# Patient Record
Sex: FEMALE | Race: WHITE | NOT HISPANIC OR LATINO
[De-identification: names, ages, dates, MRNs, and addresses within clinical notes are randomized per-mention and may not be internally consistent; named-entity substitution may affect disease eponyms.]

---

## 2017-12-07 ENCOUNTER — TRANSCRIPTION ENCOUNTER (OUTPATIENT)
Age: 82
End: 2017-12-07

## 2018-07-18 ENCOUNTER — TRANSCRIPTION ENCOUNTER (OUTPATIENT)
Age: 83
End: 2018-07-18

## 2019-03-21 PROBLEM — Z00.00 ENCOUNTER FOR PREVENTIVE HEALTH EXAMINATION: Status: ACTIVE | Noted: 2019-03-21

## 2020-08-20 ENCOUNTER — APPOINTMENT (OUTPATIENT)
Dept: VASCULAR SURGERY | Facility: CLINIC | Age: 85
End: 2020-08-20
Payer: MEDICARE

## 2020-08-20 VITALS — TEMPERATURE: 97.7 F

## 2020-08-20 PROCEDURE — 93880 EXTRACRANIAL BILAT STUDY: CPT

## 2020-11-17 ENCOUNTER — TRANSCRIPTION ENCOUNTER (OUTPATIENT)
Age: 85
End: 2020-11-17

## 2020-11-25 ENCOUNTER — TRANSCRIPTION ENCOUNTER (OUTPATIENT)
Age: 85
End: 2020-11-25

## 2020-12-22 ENCOUNTER — TRANSCRIPTION ENCOUNTER (OUTPATIENT)
Age: 85
End: 2020-12-22

## 2020-12-29 ENCOUNTER — FORM ENCOUNTER (OUTPATIENT)
Age: 85
End: 2020-12-29

## 2020-12-30 ENCOUNTER — TRANSCRIPTION ENCOUNTER (OUTPATIENT)
Age: 85
End: 2020-12-30

## 2021-01-01 ENCOUNTER — TRANSCRIPTION ENCOUNTER (OUTPATIENT)
Age: 86
End: 2021-01-01

## 2021-01-03 ENCOUNTER — OUTPATIENT (OUTPATIENT)
Dept: INPATIENT UNIT | Facility: HOSPITAL | Age: 86
LOS: 1 days | End: 2021-01-03
Payer: SELF-PAY

## 2021-01-03 ENCOUNTER — APPOINTMENT (OUTPATIENT)
Dept: DISASTER EMERGENCY | Facility: HOSPITAL | Age: 86
End: 2021-01-03

## 2021-01-03 VITALS
HEART RATE: 76 BPM | OXYGEN SATURATION: 96 % | RESPIRATION RATE: 18 BRPM | TEMPERATURE: 99 F | DIASTOLIC BLOOD PRESSURE: 78 MMHG | SYSTOLIC BLOOD PRESSURE: 122 MMHG

## 2021-01-03 VITALS
RESPIRATION RATE: 14 BRPM | SYSTOLIC BLOOD PRESSURE: 135 MMHG | HEART RATE: 75 BPM | DIASTOLIC BLOOD PRESSURE: 85 MMHG | TEMPERATURE: 98 F | OXYGEN SATURATION: 98 %

## 2021-01-03 DIAGNOSIS — U07.1 COVID-19: ICD-10-CM

## 2021-01-03 PROCEDURE — M0239: CPT

## 2021-01-03 RX ORDER — BAMLANIVIMAB 35 MG/ML
700 INJECTION, SOLUTION INTRAVENOUS ONCE
Refills: 0 | Status: COMPLETED | OUTPATIENT
Start: 2021-01-03 | End: 2021-01-03

## 2021-01-03 RX ADMIN — BAMLANIVIMAB 200 MILLIGRAM(S): 35 INJECTION, SOLUTION INTRAVENOUS at 08:05

## 2021-01-03 NOTE — CHART NOTE - NSCHARTNOTEFT_GEN_A_CORE
CC: Monoclonal Antibody Infusion/COVID 19 Positive    85yFemale with PMH hypothyroidism and HLD  Symptoms: loss of sense of taste, soft stool runny nose, minor cough, malaise  Symptoms began on: 12/29/20  Positive test on: 12/29/20      Patient denies any prior COVID treatment and tx reactions  Allergies: NKDA  Medications: synthroid PO daily, pravastatin PO daily (pt unsure dosages)      exam/findings:  T(C): 36.8 (01-03-21 @ 08:05), Max: 36.8 (01-03-21 @ 08:05)  HR: 75 (01-03-21 @ 08:05) (75 - 75)  BP: 135/85 (01-03-21 @ 08:05) (135/85 - 135/85)  RR: 14 (01-03-21 @ 08:05) (14 - 14)  SpO2: 98% (01-03-21 @ 08:05) (98% - 98%)      PE:   Appearance: NAD	  HEENT:   Normal oral mucosa,   Cardiovascular: Normal S1 S2, No JVD, No murmurs, No edema  Respiratory: Lungs clear to auscultation	  Gastrointestinal:  Soft, Non-tender, + BS	  Skin: warm and dry  Neurologic: Non-focal  Extremities: Normal range of motion,    ASSESSMENT:  Pt is a 85y Female with PMH HLD and hypothyroidism Covid + on 12/29/20  referred by Dr Gross who presents to infusion center for Monoclonal antibody infusion (Bamlanivimab)  Symptoms/ Criteria: loss of taste, malaise, minor cough, runny nose, soft stool / age greater than 65      PLAN:  - infusion procedure explained to patient   -Consent for monoclonal antibody infusion obtained   - Risk & benifits discussed/all questions answered  -infuse  Bamlanivimab 700mg  IV over one hour   -observe patient for one hour post infusion, if stable plan to d/c home with f/u as planned per PMD    I have reviewed the Bamlanivimab Emergency Use Authorization (EUA) and I have provided the patient or patient's caregiver with the following information:      1. FDA has authorized emergency use Bamlanivimab, which is not an FDA-approved biological product.  2. The patient or patient's caregiver has the option to accept or refuse administration of Bamlanivimab.   3. The significant known and potential risks and benefits of Bamlanivimab and the extent to which such risks and benefits are unknown.  4. Information on available alternative treatments and risks and benefits of those alternatives. CC: Monoclonal Antibody Infusion/COVID 19 Positive    85yFemale with PMH hypothyroidism and HLD  Symptoms: loss of sense of taste, soft stool runny nose, minor cough, malaise  Symptoms began on: 12/29/20  Positive test on: 12/30/20- verified by clinical call center       Patient denies any prior COVID treatment and tx reactions  Allergies: NKDA  Medications: synthroid PO daily, pravastatin PO daily (pt unsure dosages)      exam/findings:  T(C): 36.8 (01-03-21 @ 08:05), Max: 36.8 (01-03-21 @ 08:05)  HR: 75 (01-03-21 @ 08:05) (75 - 75)  BP: 135/85 (01-03-21 @ 08:05) (135/85 - 135/85)  RR: 14 (01-03-21 @ 08:05) (14 - 14)  SpO2: 98% (01-03-21 @ 08:05) (98% - 98%)      PE:   Appearance: NAD	  HEENT:   Normal oral mucosa,   Cardiovascular: Normal S1 S2, No JVD, No murmurs, No edema  Respiratory: Lungs clear to auscultation	  Gastrointestinal:  Soft, Non-tender, + BS	  Skin: warm and dry  Neurologic: Non-focal  Extremities: Normal range of motion,    ASSESSMENT:  Pt is a 85y Female with PMH HLD and hypothyroidism Covid + on 12/30/20  referred by Dr Gross who presents to infusion center for Monoclonal antibody infusion (Bamlanivimab)  Symptoms/ Criteria: loss of taste, malaise, minor cough, runny nose, soft stool / age greater than 65      PLAN:  - infusion procedure explained to patient   -Consent for monoclonal antibody infusion obtained   - Risk & benifits discussed/all questions answered  -infuse  Bamlanivimab 700mg  IV over one hour   -observe patient for one hour post infusion, if stable plan to d/c home with f/u as planned per PMD    I have reviewed the Bamlanivimab Emergency Use Authorization (EUA) and I have provided the patient or patient's caregiver with the following information:      1. FDA has authorized emergency use Bamlanivimab, which is not an FDA-approved biological product.  2. The patient or patient's caregiver has the option to accept or refuse administration of Bamlanivimab.   3. The significant known and potential risks and benefits of Bamlanivimab and the extent to which such risks and benefits are unknown.  4. Information on available alternative treatments and risks and benefits of those alternatives. CC: Monoclonal Antibody Infusion/COVID 19 Positive    85yFemale with PMH hypothyroidism and HLD  Symptoms: loss of sense of taste, soft stool runny nose, minor cough, malaise  Symptoms began on: 12/29/20  Positive test on: 12/30/20- verified by clinical call center       Patient denies any prior COVID treatment and tx reactions  Allergies: NKDA  Medications: synthroid PO daily, pravastatin PO daily (pt unsure dosages)      exam/findings:  T(C): 36.8 (01-03-21 @ 08:05), Max: 36.8 (01-03-21 @ 08:05)  HR: 75 (01-03-21 @ 08:05) (75 - 75)  BP: 135/85 (01-03-21 @ 08:05) (135/85 - 135/85)  RR: 14 (01-03-21 @ 08:05) (14 - 14)  SpO2: 98% (01-03-21 @ 08:05) (98% - 98%)      PE:   Appearance: NAD	  HEENT:   Normal oral mucosa,   Cardiovascular: Normal S1 S2, No JVD, No murmurs, No edema  Respiratory: Lungs clear to auscultation	  Gastrointestinal:  Soft, Non-tender, + BS	  Skin: warm and dry  Neurologic: Non-focal  Extremities: Normal range of motion,    ASSESSMENT:  Pt is a 85y Female with PMH HLD and hypothyroidism Covid + on 12/30/20  referred by Dr Gross who presents to infusion center for Monoclonal antibody infusion (Bamlanivimab)  Symptoms/ Criteria: loss of taste, malaise, minor cough, runny nose, soft stool / age greater than 65      PLAN:  - infusion procedure explained to patient   -Consent for monoclonal antibody infusion obtained   - Risk & benefits discussed/all questions answered  -infuse  Bamlanivimab 700mg  IV over one hour   -observe patient for one hour post infusion, if stable plan to d/c home with f/u as planned per PMD    I have reviewed the Bamlanivimab Emergency Use Authorization (EUA) and I have provided the patient or patient's caregiver with the following information:      1. FDA has authorized emergency use Bamlanivimab, which is not an FDA-approved biological product.  2. The patient or patient's caregiver has the option to accept or refuse administration of Bamlanivimab.   3. The significant known and potential risks and benefits of Bamlanivimab and the extent to which such risks and benefits are unknown.  4. Information on available alternative treatments and risks and benefits of those alternatives.    1030- Bamlanivimab infusion and post infusion protocol complete  Patient tolerated well and denies any new complaints   T(C): 37 (01-03-21 @ 10:05), Max: 37 (01-03-21 @ 08:36)  HR: 76 (01-03-21 @ 10:05) (72 - 76)  BP: 122/78 (01-03-21 @ 10:05) (114/66 - 135/85)  RR: 18 (01-03-21 @ 10:05) (14 - 18)  SpO2: 96% (01-03-21 @ 10:05) (96% - 98%)      Patient tolerated infusion well and is stable for discharge home  Discharge instructions and strict return precautions reviewed with the patient who indicates understanding. All questions answered.

## 2021-01-04 ENCOUNTER — TRANSCRIPTION ENCOUNTER (OUTPATIENT)
Age: 86
End: 2021-01-04

## 2021-07-23 ENCOUNTER — RESULT REVIEW (OUTPATIENT)
Age: 86
End: 2021-07-23

## 2021-08-11 ENCOUNTER — APPOINTMENT (OUTPATIENT)
Dept: ULTRASOUND IMAGING | Facility: CLINIC | Age: 86
End: 2021-08-11
Payer: MEDICARE

## 2021-08-11 ENCOUNTER — APPOINTMENT (OUTPATIENT)
Dept: MAMMOGRAPHY | Facility: CLINIC | Age: 86
End: 2021-08-11
Payer: MEDICARE

## 2021-08-11 ENCOUNTER — APPOINTMENT (OUTPATIENT)
Dept: RADIOLOGY | Facility: CLINIC | Age: 86
End: 2021-08-11
Payer: MEDICARE

## 2021-08-11 PROCEDURE — 77063 BREAST TOMOSYNTHESIS BI: CPT

## 2021-08-11 PROCEDURE — 76641 ULTRASOUND BREAST COMPLETE: CPT | Mod: 50

## 2021-08-11 PROCEDURE — 77067 SCR MAMMO BI INCL CAD: CPT

## 2021-08-11 PROCEDURE — 77080 DXA BONE DENSITY AXIAL: CPT

## 2021-10-07 ENCOUNTER — INPATIENT (INPATIENT)
Facility: HOSPITAL | Age: 86
LOS: 4 days | Discharge: HOME HEALTH | DRG: 690 | End: 2021-10-12
Attending: INTERNAL MEDICINE | Admitting: INTERNAL MEDICINE
Payer: MEDICARE

## 2021-10-07 VITALS
WEIGHT: 171.08 LBS | SYSTOLIC BLOOD PRESSURE: 209 MMHG | RESPIRATION RATE: 16 BRPM | HEART RATE: 77 BPM | HEIGHT: 60 IN | TEMPERATURE: 98 F | OXYGEN SATURATION: 95 % | DIASTOLIC BLOOD PRESSURE: 92 MMHG
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
## 2021-10-07 DIAGNOSIS — N39.0 URINARY TRACT INFECTION, SITE NOT SPECIFIED: ICD-10-CM

## 2021-10-07 DIAGNOSIS — E03.9 HYPOTHYROIDISM, UNSPECIFIED: ICD-10-CM

## 2021-10-07 DIAGNOSIS — F41.9 ANXIETY DISORDER, UNSPECIFIED: ICD-10-CM

## 2021-10-07 DIAGNOSIS — E78.5 HYPERLIPIDEMIA, UNSPECIFIED: ICD-10-CM

## 2021-10-07 LAB
ALBUMIN SERPL ELPH-MCNC: 4.4 G/DL — SIGNIFICANT CHANGE UP (ref 3.3–5)
ALP SERPL-CCNC: 77 U/L — SIGNIFICANT CHANGE UP (ref 40–120)
ALT FLD-CCNC: 15 U/L — SIGNIFICANT CHANGE UP (ref 10–45)
ANION GAP SERPL CALC-SCNC: 14 MMOL/L — SIGNIFICANT CHANGE UP (ref 5–17)
APPEARANCE UR: ABNORMAL
AST SERPL-CCNC: 19 U/L — SIGNIFICANT CHANGE UP (ref 10–40)
BACTERIA # UR AUTO: NEGATIVE — SIGNIFICANT CHANGE UP
BASOPHILS # BLD AUTO: 0.07 K/UL — SIGNIFICANT CHANGE UP (ref 0–0.2)
BASOPHILS NFR BLD AUTO: 0.8 % — SIGNIFICANT CHANGE UP (ref 0–2)
BILIRUB SERPL-MCNC: 0.4 MG/DL — SIGNIFICANT CHANGE UP (ref 0.2–1.2)
BILIRUB UR-MCNC: NEGATIVE — SIGNIFICANT CHANGE UP
BUN SERPL-MCNC: 22 MG/DL — SIGNIFICANT CHANGE UP (ref 7–23)
CALCIUM SERPL-MCNC: 10 MG/DL — SIGNIFICANT CHANGE UP (ref 8.4–10.5)
CHLORIDE SERPL-SCNC: 104 MMOL/L — SIGNIFICANT CHANGE UP (ref 96–108)
CO2 SERPL-SCNC: 23 MMOL/L — SIGNIFICANT CHANGE UP (ref 22–31)
COLOR SPEC: YELLOW — SIGNIFICANT CHANGE UP
CREAT SERPL-MCNC: 0.83 MG/DL — SIGNIFICANT CHANGE UP (ref 0.5–1.3)
DIFF PNL FLD: NEGATIVE — SIGNIFICANT CHANGE UP
EOSINOPHIL # BLD AUTO: 0.1 K/UL — SIGNIFICANT CHANGE UP (ref 0–0.5)
EOSINOPHIL NFR BLD AUTO: 1.1 % — SIGNIFICANT CHANGE UP (ref 0–6)
EPI CELLS # UR: 2 /HPF — SIGNIFICANT CHANGE UP
GLUCOSE SERPL-MCNC: 139 MG/DL — HIGH (ref 70–99)
GLUCOSE UR QL: NEGATIVE — SIGNIFICANT CHANGE UP
HCT VFR BLD CALC: 45.9 % — HIGH (ref 34.5–45)
HGB BLD-MCNC: 15.5 G/DL — SIGNIFICANT CHANGE UP (ref 11.5–15.5)
HYALINE CASTS # UR AUTO: 2 /LPF — SIGNIFICANT CHANGE UP (ref 0–2)
IMM GRANULOCYTES NFR BLD AUTO: 0.6 % — SIGNIFICANT CHANGE UP (ref 0–1.5)
KETONES UR-MCNC: NEGATIVE — SIGNIFICANT CHANGE UP
LEUKOCYTE ESTERASE UR-ACNC: ABNORMAL
LYMPHOCYTES # BLD AUTO: 2.75 K/UL — SIGNIFICANT CHANGE UP (ref 1–3.3)
LYMPHOCYTES # BLD AUTO: 31.6 % — SIGNIFICANT CHANGE UP (ref 13–44)
MCHC RBC-ENTMCNC: 31.8 PG — SIGNIFICANT CHANGE UP (ref 27–34)
MCHC RBC-ENTMCNC: 33.8 GM/DL — SIGNIFICANT CHANGE UP (ref 32–36)
MCV RBC AUTO: 94.3 FL — SIGNIFICANT CHANGE UP (ref 80–100)
MONOCYTES # BLD AUTO: 0.47 K/UL — SIGNIFICANT CHANGE UP (ref 0–0.9)
MONOCYTES NFR BLD AUTO: 5.4 % — SIGNIFICANT CHANGE UP (ref 2–14)
NEUTROPHILS # BLD AUTO: 5.27 K/UL — SIGNIFICANT CHANGE UP (ref 1.8–7.4)
NEUTROPHILS NFR BLD AUTO: 60.5 % — SIGNIFICANT CHANGE UP (ref 43–77)
NITRITE UR-MCNC: NEGATIVE — SIGNIFICANT CHANGE UP
NRBC # BLD: 0 /100 WBCS — SIGNIFICANT CHANGE UP (ref 0–0)
PH UR: 7.5 — SIGNIFICANT CHANGE UP (ref 5–8)
PLATELET # BLD AUTO: 185 K/UL — SIGNIFICANT CHANGE UP (ref 150–400)
POTASSIUM SERPL-MCNC: 4.2 MMOL/L — SIGNIFICANT CHANGE UP (ref 3.5–5.3)
POTASSIUM SERPL-SCNC: 4.2 MMOL/L — SIGNIFICANT CHANGE UP (ref 3.5–5.3)
PROT SERPL-MCNC: 7.5 G/DL — SIGNIFICANT CHANGE UP (ref 6–8.3)
PROT UR-MCNC: ABNORMAL
RBC # BLD: 4.87 M/UL — SIGNIFICANT CHANGE UP (ref 3.8–5.2)
RBC # FLD: 12.4 % — SIGNIFICANT CHANGE UP (ref 10.3–14.5)
RBC CASTS # UR COMP ASSIST: 6 /HPF — HIGH (ref 0–4)
SARS-COV-2 RNA SPEC QL NAA+PROBE: SIGNIFICANT CHANGE UP
SODIUM SERPL-SCNC: 141 MMOL/L — SIGNIFICANT CHANGE UP (ref 135–145)
SP GR SPEC: 1.02 — SIGNIFICANT CHANGE UP (ref 1.01–1.02)
TROPONIN T, HIGH SENSITIVITY RESULT: 10 NG/L — SIGNIFICANT CHANGE UP (ref 0–51)
TROPONIN T, HIGH SENSITIVITY RESULT: 10 NG/L — SIGNIFICANT CHANGE UP (ref 0–51)
UROBILINOGEN FLD QL: NEGATIVE — SIGNIFICANT CHANGE UP
WBC # BLD: 8.71 K/UL — SIGNIFICANT CHANGE UP (ref 3.8–10.5)
WBC # FLD AUTO: 8.71 K/UL — SIGNIFICANT CHANGE UP (ref 3.8–10.5)
WBC UR QL: 25 /HPF — HIGH (ref 0–5)

## 2021-10-07 PROCEDURE — 99285 EMERGENCY DEPT VISIT HI MDM: CPT

## 2021-10-07 PROCEDURE — 93010 ELECTROCARDIOGRAM REPORT: CPT

## 2021-10-07 PROCEDURE — G1004: CPT

## 2021-10-07 PROCEDURE — 99223 1ST HOSP IP/OBS HIGH 75: CPT

## 2021-10-07 PROCEDURE — 71045 X-RAY EXAM CHEST 1 VIEW: CPT | Mod: 26

## 2021-10-07 PROCEDURE — 70450 CT HEAD/BRAIN W/O DYE: CPT | Mod: 26,MG

## 2021-10-07 RX ORDER — LEVOTHYROXINE SODIUM 125 MCG
1 TABLET ORAL
Qty: 0 | Refills: 0 | DISCHARGE

## 2021-10-07 RX ORDER — ASPIRIN/CALCIUM CARB/MAGNESIUM 324 MG
1 TABLET ORAL
Qty: 0 | Refills: 0 | DISCHARGE

## 2021-10-07 RX ORDER — ENOXAPARIN SODIUM 100 MG/ML
40 INJECTION SUBCUTANEOUS DAILY
Refills: 0 | Status: DISCONTINUED | OUTPATIENT
Start: 2021-10-07 | End: 2021-10-12

## 2021-10-07 RX ORDER — ALPRAZOLAM 0.25 MG
0 TABLET ORAL
Qty: 0 | Refills: 0 | DISCHARGE

## 2021-10-07 RX ORDER — ASPIRIN/CALCIUM CARB/MAGNESIUM 324 MG
81 TABLET ORAL DAILY
Refills: 0 | Status: DISCONTINUED | OUTPATIENT
Start: 2021-10-07 | End: 2021-10-07

## 2021-10-07 RX ORDER — FOLIC ACID 0.8 MG
1 TABLET ORAL DAILY
Refills: 0 | Status: DISCONTINUED | OUTPATIENT
Start: 2021-10-08 | End: 2021-10-12

## 2021-10-07 RX ORDER — CEFTRIAXONE 500 MG/1
1000 INJECTION, POWDER, FOR SOLUTION INTRAMUSCULAR; INTRAVENOUS EVERY 24 HOURS
Refills: 0 | Status: COMPLETED | OUTPATIENT
Start: 2021-10-08 | End: 2021-10-09

## 2021-10-07 RX ORDER — DOCUSATE SODIUM 100 MG
1 CAPSULE ORAL
Qty: 0 | Refills: 0 | DISCHARGE

## 2021-10-07 RX ORDER — ALPRAZOLAM 0.25 MG
0.5 TABLET ORAL AT BEDTIME
Refills: 0 | Status: DISCONTINUED | OUTPATIENT
Start: 2021-10-07 | End: 2021-10-12

## 2021-10-07 RX ORDER — FOLIC ACID 0.8 MG
0 TABLET ORAL
Qty: 0 | Refills: 0 | DISCHARGE

## 2021-10-07 RX ORDER — CHOLECALCIFEROL (VITAMIN D3) 125 MCG
0 CAPSULE ORAL
Qty: 0 | Refills: 0 | DISCHARGE

## 2021-10-07 RX ORDER — ATORVASTATIN CALCIUM 80 MG/1
20 TABLET, FILM COATED ORAL AT BEDTIME
Refills: 0 | Status: DISCONTINUED | OUTPATIENT
Start: 2021-10-07 | End: 2021-10-12

## 2021-10-07 RX ORDER — ALPRAZOLAM 0.25 MG
0.5 TABLET ORAL
Qty: 0 | Refills: 0 | DISCHARGE

## 2021-10-07 RX ORDER — ONDANSETRON 8 MG/1
4 TABLET, FILM COATED ORAL ONCE
Refills: 0 | Status: COMPLETED | OUTPATIENT
Start: 2021-10-07 | End: 2021-10-07

## 2021-10-07 RX ORDER — ASPIRIN/CALCIUM CARB/MAGNESIUM 324 MG
81 TABLET ORAL DAILY
Refills: 0 | Status: DISCONTINUED | OUTPATIENT
Start: 2021-10-08 | End: 2021-10-12

## 2021-10-07 RX ORDER — LEVOTHYROXINE SODIUM 125 MCG
75 TABLET ORAL DAILY
Refills: 0 | Status: DISCONTINUED | OUTPATIENT
Start: 2021-10-08 | End: 2021-10-12

## 2021-10-07 RX ORDER — CEFTRIAXONE 500 MG/1
1000 INJECTION, POWDER, FOR SOLUTION INTRAMUSCULAR; INTRAVENOUS ONCE
Refills: 0 | Status: COMPLETED | OUTPATIENT
Start: 2021-10-07 | End: 2021-10-07

## 2021-10-07 RX ADMIN — ONDANSETRON 4 MILLIGRAM(S): 8 TABLET, FILM COATED ORAL at 21:11

## 2021-10-07 RX ADMIN — Medication 0.5 MILLIGRAM(S): at 22:15

## 2021-10-07 RX ADMIN — ATORVASTATIN CALCIUM 20 MILLIGRAM(S): 80 TABLET, FILM COATED ORAL at 22:15

## 2021-10-07 RX ADMIN — CEFTRIAXONE 100 MILLIGRAM(S): 500 INJECTION, POWDER, FOR SOLUTION INTRAMUSCULAR; INTRAVENOUS at 15:06

## 2021-10-07 NOTE — ED ADULT NURSE NOTE - OBJECTIVE STATEMENT
86 y F pmhx of high cholesterol, hypothyroidism presents to the ED with dizziness, weakness and the inability to stand. Reports that last night she was trying to go to the bathroom and had to hold onto the walls and did fall to her knees. Denies hitting head and LOC. On assessment, A&Ox4. PERRL 2 mm. GALVAN. no facial droop, slurred speech, and arm drift. strength equal in all extremities. Sensation normal in all extremities. no vision changes. Denies headaches, lightheadedness, numbness and tingling. Breathing spontaneously and unlabored on Room air. Denies cough, SOB and CP. No Peripheral edema. Peripheral pulses strong and equal bilaterally. Denies CP, SOB and palpitations. Abdomen soft, nontender, nondistended, negative CVA tenderness, positive bowel sounds in all four quadrants. Pt is continent. Denies n/v/d, dysuria, melena and hematuria. IV placed 18g in LAC. Labs drawn and sent. Pt safety maintained. Call bell within reach. Side rails in upward position. Pt awaiting dispo.

## 2021-10-07 NOTE — PROVIDER CONTACT NOTE (OTHER) - ASSESSMENT
Patient agitated, and vomiting. RR 24, other VSS. Patient very anxious and states she "knows something is very wrong"

## 2021-10-07 NOTE — ED PROVIDER NOTE - CLINICAL SUMMARY MEDICAL DECISION MAKING FREE TEXT BOX
Faisal: 86 year old female with generalized weakness last night and fell to ground and landed on knees and felt weak this am when getting up. will get labs, cxr, ekg, cardiac enzymes, ct head. no focal deficits on exam. will reassess. given not able to ambulate without feeling weak/lightheaded will admit. does not feel room spinning. will r/o infectious causes.

## 2021-10-07 NOTE — H&P ADULT - NSHPPHYSICALEXAM_GEN_ALL_CORE
Vital Signs Last 24 Hrs  T(F): 98.2 (07 Oct 2021 10:55), Max: 98.2 (07 Oct 2021 10:55)  HR: 85 (07 Oct 2021 16:00) (77 - 87)  BP: 178/67 (07 Oct 2021 17:04) (167/65 - 221/91)  RR: 18 (07 Oct 2021 16:00) (16 - 18)  SpO2: 98% (07 Oct 2021 16:00) (95% - 99%)    GEN: elderly woman, laying in stretcher in NAD  PSYCH: A&Ox3, mood and affect appear appropriate   SKIN: intact, no e/o rash  NEURO: no focal neurologic deficits appreciated; CN II-XII intact, sensation intact B/L, motor 5/5 in all mm groups  EYES: PERRL, anicteric, EOMI, no vertical/horizontal nystagmus  HEAD: NC, AT  NECK: supple, no e/o elevated JVP  RESPI: no accessory muscle use, B/L air entry, CTAB   CARDIO: regular rate/rhythm, no LE edema B/L  ABD: soft, NT, ND  EXT: patient able to move all extremities spontaneously  VASC: peripheral pulses palpated

## 2021-10-07 NOTE — ED PROVIDER NOTE - PHYSICAL EXAMINATION
CONSTITUTIONAL: Patient is awake, alert and oriented x 3. Patient is well appearing and in no acute distress.  HEAD: NCAT,   EYES: PERRL b/l, EOMI,   NECK: supple,   LUNGS: CTA B/L,  HEART: RRR.+S1S2   ABDOMEN: Soft nd/nt  EXTREMITY: no edema or calf tenderness b/l, FROM upper and lower ext b/l  SKIN: with no rash or lesions.   NEURO: Cn3-12 grossly intact. Strength 5/5 UE/LE, Nml Sensation CONSTITUTIONAL: Patient is awake, alert and oriented x 3. Patient is well appearing and in no acute distress.  HEAD: NCAT,   EYES: PERRL b/l, EOMI, no horizontal nystagmus,    NECK: supple, FROM,  LUNGS: CTA B/L, no rhonchi, no crackles   HEART: RRR.+S1S2   ABDOMEN: Soft nd/nt  EXTREMITY: no edema or calf tenderness b/l, FROM upper and lower ext b/l  SKIN: with no rash or lesions.   NEURO: Cn3-12 grossly intact. Strength 5/5 UE/LE, Nml Sensation, no focal deficits.

## 2021-10-07 NOTE — ED PROVIDER NOTE - DOMESTIC TRAVEL HIGH RISK QUESTION
[FreeTextEntry1] : -pt evaluated discussed differential diagnosis; sesamoiditis vs fracture \par -pt sent for xrays\par -Dispensed surgical shoe with fabrication of Dancers pad\par -pt instructed to rest with minimal WB activity\par -WIll consider injection therapy once fracture is ruled out\par -Pt to return friday 
No

## 2021-10-07 NOTE — H&P ADULT - HISTORY OF PRESENT ILLNESS
86yoF w/ PMHx significant for HLD and hypothyroidism presents from home with complaints of B/L LE weakness since last night, first noticed when she woke up from sleep to use the bathroom. She was so weak at that point, that she was unable to stand, thereafter falling forward onto her knees, not hitting her head or losing consciousness. She was able to get back into bed by walking while holding onto her furniture, and this morning when she awoke, again to use the bathroom, she had the same symptoms, and was only able to make it to the bathroom this time by holding on to her walls. At baseline patient walks without assistance. She denies associated or recent CP, palpitations, focal neurologic changes, vision changes, numbness/tingling, seizure-like movements, tongue biting, fever/chills, cough, myalgia, abd pain, n/v/d/c, or bowel/bladder incontinence, but she does endorse lightheadedness and increased frequency of urination.    ED Presenting Vitals: 98F, 77bpm, 209/92 --> 167/65 (1hour later w/o intervention), 16br/m, 95% on RA  ED Course: s/p Ceftriaxone 1g IV x1; UCx sent by ED

## 2021-10-07 NOTE — ED PROVIDER NOTE - PROGRESS NOTE DETAILS
Niya: Patient still feels lightheaded still when gets up. UA shows UTI. spoke with Dr. Gross who agrees with plan and will admit patient. not CDU candidate at this time.  requiring wheelchair to go to bathroom and assistance. no focal deficits, 5/5 b/l le/ue. eomi, peerla, no nystagmus.

## 2021-10-07 NOTE — PROVIDER CONTACT NOTE (OTHER) - BACKGROUND
Patient admitted for UTI. Has been hypertensive since arrival and weak. History of HLD and hypothyroidism.

## 2021-10-07 NOTE — ED PROVIDER NOTE - OBJECTIVE STATEMENT
85 y/o female with PMHx of HLD and hypothyroidism presents to the ED complaining of weakness in her legs since last night. Patient states that she woke up from sleep last night to use the bathroom and suddenly she felt so weak that she was unable to stand. Patient states that she fell forward and landed on her knees. She did not hit her head or loose consciousness. She was able to get back to her bed by holding onto furniture. States that she also felt "lightheaded". Patient states that this morning when she got up to use the bathroom she had the same symptoms and had to hold onto the wall to make it to the bathroom. Normally patient walks without assistance. Patient states that she has been feeling well lately. Denies any recent illness, headache, fever, chills, chest pain, SOB, cough, abdominal pain, n/v/d, numbness, tingling, loss of sensation, syncope.

## 2021-10-07 NOTE — ED ADULT NURSE REASSESSMENT NOTE - NS ED NURSE REASSESS COMMENT FT1
Handoff report received from MARIBEL Hernandez. Pt resting comfortably in orange 61. Pt A&O x 4, VSS. BP elevated. PA Светлана made aware. PERRL 2mm. VITALIY. Muscle strength +5 in upper and lower extremities bilaterally. Pt c/o weakness and lightheadedness upon standing. Bed in lowest position, side rails up and call bell in reach.

## 2021-10-07 NOTE — ED ADULT NURSE REASSESSMENT NOTE - NS ED NURSE REASSESS COMMENT FT1
KIMBERLY Sotomayor made aware of mannual BP. As per KIMBERLY Sotomayor, no intervention at this time. Pt denies CP and SOB.

## 2021-10-07 NOTE — CHART NOTE - NSCHARTNOTEFT_GEN_A_CORE
Confidential Drug Utilization Report  Search Terms: Oksana Durbin, 1935Search Date: 10/07/2021 18:21:09 PM  The Drug Utilization Report below displays all of the controlled substance prescriptions, if any, that your patient has filled in the last twelve months. The information displayed on this report is compiled from pharmacy submissions to the Department, and accurately reflects the information as submitted by the pharmacies.    This report was requested by: Nathan Tierney | Reference #: [id number]    Others' Prescriptions  Patient Name: Oksana Durbin  YOB: 1935  Address: [address] [place] [address]  Sex: Female    Rx Written	Rx Dispensed	Drug	Quantity	Days Supply	Prescriber Name	Prescriber Lata #	Payment Method	Dispenser  09/08/2021	09/09/2021	alprazolam 1 mg tablet	30	30	RenatoTim aguilera MD	[id number]	Medicare	[id number]  08/05/2021	08/06/2021	alprazolam 1 mg tablet	30	30	RenatoTim MD	[id number]	Medicare	[id number]  07/01/2021	07/02/2021	alprazolam 1 mg tablet	30	30	RenatoTim MD	[id number]	Medicare	[id number]  06/01/2021	06/01/2021	alprazolam 1 mg tablet	30	30	RenatoTim MD	[id number]	Medicare	[id number]  04/29/2021	04/30/2021	alprazolam 1 mg tablet	30	30	RenatoTim	[id number]	Medicare	[id number]  03/23/2021	03/28/2021	alprazolam 1 mg tablet	30	30	RenatoTim	[id number]	Medicare	[id number]  02/26/2021	02/27/2021	alprazolam 1 mg tablet	30	30	RenatoTim	[id number]	Medicare	[id number]  01/25/2021	01/26/2021	alprazolam 1 mg tablet	30	30	RenatoTim MD	[id number]	Medicare	[id number]  12/24/2020	12/25/2020	alprazolam 1 mg tablet	30	30	RenatoTim MD	[id number]	Medicare	[id number]  11/23/2020	11/28/2020	alprazolam 1 mg tablet	30	30	Tim Gross MD	[id number]	Medicare	[id number]  10/28/2020	10/29/2020	alprazolam 1 mg tablet	30	30	Tim Gross MD	[id number]	Medicare	[id number]

## 2021-10-07 NOTE — H&P ADULT - ASSESSMENT
86yoF w/ PMHx significant for HLD and hypothyroidism presents from home with complaints of B/L LE weakness since last night, first noticed when she woke up from sleep to use the bathroom; in setting of recent lightheadedness and increased frequency of urination.

## 2021-10-07 NOTE — H&P ADULT - NSHPLABSRESULTS_GEN_ALL_CORE
Labs and imaging data obtained by the ED personally reviewed.                        15.5   8.71  )-----------( 185      ( 07 Oct 2021 11:22 )             45.9   10-    141  |  104  |  22  ----------------------------<  139<H>  4.2   |  23  |  0.83    Ca    10.0      07 Oct 2021 11:22    TPro  7.5  /  Alb  4.4  /  TBili  0.4  /  DBili  x   /  AST  19  /  ALT  15  /  AlkPhos  77  10-07    hsTrop = 10 x2    Urinalysis Basic - ( 07 Oct 2021 13:09 )  Color: Yellow / Appearance: Turbid / S.018 / pH: x  Gluc: x / Ketone: Negative  / Bili: Negative / Urobili: Negative   Blood: x / Protein: Trace / Nitrite: Negative   Leuk Esterase: Large / RBC: 6 /hpf / WBC 25 /HPF   Sq Epi: x / Non Sq Epi: 2 /hpf / Bacteria: Negative    COVID 19 PCR Not detected    CXR as reported: Clear lungs    CTH w/o contrast as reported: No hydrocephalus, acute intracranial hemorrhage, mass effect, or brain edema.

## 2021-10-07 NOTE — PROVIDER CONTACT NOTE (OTHER) - SITUATION
Patient sitting up to get into the wheelchair to void and is vomiting whenever we try to sit her up at the edge of the bed to pivot her.

## 2021-10-07 NOTE — CONSULT NOTE ADULT - ATTENDING COMMENTS
87 yo female with a PMHx of HLD and hypothyroidism presents to the ED for dizziness. On  10/07 AM she felt a dizziness that she further describes as lightheadedness, as if she was going to faint PE non-focal CTH no acute findings        Impression: presyncope.    Recommendations:  syncope w/u  low suspicion for a central process

## 2021-10-08 LAB
ANION GAP SERPL CALC-SCNC: 17 MMOL/L — SIGNIFICANT CHANGE UP (ref 5–17)
BUN SERPL-MCNC: 13 MG/DL — SIGNIFICANT CHANGE UP (ref 7–23)
CALCIUM SERPL-MCNC: 9.3 MG/DL — SIGNIFICANT CHANGE UP (ref 8.4–10.5)
CHLORIDE SERPL-SCNC: 100 MMOL/L — SIGNIFICANT CHANGE UP (ref 96–108)
CO2 SERPL-SCNC: 23 MMOL/L — SIGNIFICANT CHANGE UP (ref 22–31)
COVID-19 SPIKE DOMAIN AB INTERP: POSITIVE
COVID-19 SPIKE DOMAIN ANTIBODY RESULT: >250 U/ML — HIGH
CREAT SERPL-MCNC: 0.78 MG/DL — SIGNIFICANT CHANGE UP (ref 0.5–1.3)
GLUCOSE SERPL-MCNC: 118 MG/DL — HIGH (ref 70–99)
HCT VFR BLD CALC: 44.3 % — SIGNIFICANT CHANGE UP (ref 34.5–45)
HGB BLD-MCNC: 15.3 G/DL — SIGNIFICANT CHANGE UP (ref 11.5–15.5)
MCHC RBC-ENTMCNC: 32.1 PG — SIGNIFICANT CHANGE UP (ref 27–34)
MCHC RBC-ENTMCNC: 34.5 GM/DL — SIGNIFICANT CHANGE UP (ref 32–36)
MCV RBC AUTO: 92.9 FL — SIGNIFICANT CHANGE UP (ref 80–100)
NRBC # BLD: 0 /100 WBCS — SIGNIFICANT CHANGE UP (ref 0–0)
PLATELET # BLD AUTO: 175 K/UL — SIGNIFICANT CHANGE UP (ref 150–400)
POTASSIUM SERPL-MCNC: 4 MMOL/L — SIGNIFICANT CHANGE UP (ref 3.5–5.3)
POTASSIUM SERPL-SCNC: 4 MMOL/L — SIGNIFICANT CHANGE UP (ref 3.5–5.3)
RBC # BLD: 4.77 M/UL — SIGNIFICANT CHANGE UP (ref 3.8–5.2)
RBC # FLD: 12.3 % — SIGNIFICANT CHANGE UP (ref 10.3–14.5)
SARS-COV-2 IGG+IGM SERPL QL IA: >250 U/ML — HIGH
SARS-COV-2 IGG+IGM SERPL QL IA: POSITIVE
SODIUM SERPL-SCNC: 140 MMOL/L — SIGNIFICANT CHANGE UP (ref 135–145)
WBC # BLD: 11.71 K/UL — HIGH (ref 3.8–10.5)
WBC # FLD AUTO: 11.71 K/UL — HIGH (ref 3.8–10.5)

## 2021-10-08 RX ORDER — ACETAMINOPHEN 500 MG
650 TABLET ORAL ONCE
Refills: 0 | Status: COMPLETED | OUTPATIENT
Start: 2021-10-08 | End: 2021-10-08

## 2021-10-08 RX ORDER — ONDANSETRON 8 MG/1
4 TABLET, FILM COATED ORAL ONCE
Refills: 0 | Status: COMPLETED | OUTPATIENT
Start: 2021-10-08 | End: 2021-10-08

## 2021-10-08 RX ORDER — SENNA PLUS 8.6 MG/1
1 TABLET ORAL ONCE
Refills: 0 | Status: COMPLETED | OUTPATIENT
Start: 2021-10-08 | End: 2021-10-08

## 2021-10-08 RX ORDER — ACETAMINOPHEN 500 MG
650 TABLET ORAL ONCE
Refills: 0 | Status: DISCONTINUED | OUTPATIENT
Start: 2021-10-08 | End: 2021-10-08

## 2021-10-08 RX ORDER — CHLORHEXIDINE GLUCONATE 213 G/1000ML
1 SOLUTION TOPICAL DAILY
Refills: 0 | Status: DISCONTINUED | OUTPATIENT
Start: 2021-10-08 | End: 2021-10-12

## 2021-10-08 RX ORDER — ACETAMINOPHEN 500 MG
1000 TABLET ORAL ONCE
Refills: 0 | Status: DISCONTINUED | OUTPATIENT
Start: 2021-10-08 | End: 2021-10-08

## 2021-10-08 RX ADMIN — Medication 1 MILLIGRAM(S): at 12:05

## 2021-10-08 RX ADMIN — Medication 650 MILLIGRAM(S): at 04:15

## 2021-10-08 RX ADMIN — Medication 81 MILLIGRAM(S): at 12:05

## 2021-10-08 RX ADMIN — Medication 0.5 MILLIGRAM(S): at 21:18

## 2021-10-08 RX ADMIN — CHLORHEXIDINE GLUCONATE 1 APPLICATION(S): 213 SOLUTION TOPICAL at 11:54

## 2021-10-08 RX ADMIN — Medication 75 MICROGRAM(S): at 05:26

## 2021-10-08 RX ADMIN — Medication 650 MILLIGRAM(S): at 03:44

## 2021-10-08 RX ADMIN — Medication 1 TABLET(S): at 13:33

## 2021-10-08 RX ADMIN — ONDANSETRON 4 MILLIGRAM(S): 8 TABLET, FILM COATED ORAL at 21:19

## 2021-10-08 RX ADMIN — SENNA PLUS 1 TABLET(S): 8.6 TABLET ORAL at 21:18

## 2021-10-08 RX ADMIN — CEFTRIAXONE 100 MILLIGRAM(S): 500 INJECTION, POWDER, FOR SOLUTION INTRAMUSCULAR; INTRAVENOUS at 14:02

## 2021-10-08 RX ADMIN — ATORVASTATIN CALCIUM 20 MILLIGRAM(S): 80 TABLET, FILM COATED ORAL at 21:18

## 2021-10-08 RX ADMIN — ENOXAPARIN SODIUM 40 MILLIGRAM(S): 100 INJECTION SUBCUTANEOUS at 12:05

## 2021-10-08 NOTE — PHYSICAL THERAPY INITIAL EVALUATION ADULT - PERTINENT HX OF CURRENT PROBLEM, REHAB EVAL
Pt is a 86yoF admitted to [facility] on 10/[facility]21 w/ PMHx significant for HLD and hypothyroidism presents from home with c/o BLE weakness since last night, first noticed when she woke up from sleep to use the bathroom. She was so weak at that point, that she was unable to stand, thereafter falling forward onto her knees, not hitting her head or (-) LOC.

## 2021-10-08 NOTE — CONSULT NOTE ADULT - ASSESSMENT
Impression:  Unclear etiology of LE weakness, lightheadedness and falling episode due to this.  Although patient may have UTI, she has never had these symptoms before and certainly is not septic.                       Episode of nausea and vomiting yesterday sitting up in bed to go into wheelchair.                              Doubt vertigo.  R/O vertebrobasilar insufficiency.  Prior CT head did reveal intracranial atheromatous changes and patient had right carotid bruit.                          BP elevation on admission likely anxiety.                             R/O orthostasis.    Plan:  Check orthostatics.              Continue ceftriaxone awaiting results of urine culture.              Carotid duplex.               MRI/ MRA head.
Assessment: 87 yo female with a PMHx of HLD and hypothyroidism presents to the ED for dizziness. Patient states she was in her usual state of health yesterday ([birth year] her birthday) and had a big birthday celebration with family and friends. When she woke up early 10/07 AM to use the bathroom, she got out of bed and felt a dizziness that she further describes as lightheadedness, as if she was going to faint. At one point, her knees buckled and she collapsed onto her knees due to this lightheadedness. In the ED, patient was found to have a UTI. BP also persistently elevated, SBP >200 on initial presentation. Patient denies history of HTN, although last checkup with PCP was about 1 year ago.     Impression: Lightheadedness in setting of acute infection and significant HTN. Syncopal workup pending. No suspicion of seizure.    Recommendations:  [] Would obtain orthostatic vitals.  [] If patient does not improve following treatment of UTI, then can consider MRI Head w/o.  [] Treatment of UTI/HTN and rest of care per primary team.    Case to be discussed with neurology attending Dr. Tapia.

## 2021-10-08 NOTE — CONSULT NOTE ADULT - SUBJECTIVE AND OBJECTIVE BOX
ALFREDO HANEY  Female  MRN-[id number]    HPI: 87 yo female with a PMHx of HLD and hypothyroidism presents to the ED for dizziness. Patient states she was in her usual state of health yesterday ([birth year] her birthday) and had a big birthday celebration with family and friends. When she woke up early 10/07 AM to use the bathroom, she got out of bed and felt a dizziness that she further describes as lightheadedness, as if she was going to faint. She managed to get to the bathroom by holding onto furniture. When she finished using the bathroom, she again felt lightheaded upon standing. As she was trying to get back to bed, her knees buckled and she fell down onto her knees. No LOC, did not hit her head. She crawled the rest of the way to her bed and managed to pull herself back up into bed and fell asleep. When she woke up later in the morning she still felt lightheaded and was unable to ambulate without feeling like she was going to pass out so she came to the ED to be evaluated. In the ED, patient was found to have a UTI. BP also persistently elevated, SBP >200 on initial presentation. Patient denies history of HTN, although last checkup with PCP was about 1 year ago. Denies HA, room-spinning dizziness, blurry/double vision, numbness/tingling.    ROS: All negative except as mentioned in HPI.    PAST MEDICAL & SURGICAL HISTORY:  Hypothyroidism    HLD (hyperlipidemia)    MEDICATIONS  (STANDING):  ALPRAZolam 0.5 milliGRAM(s) Oral at bedtime  atorvastatin 20 milliGRAM(s) Oral at bedtime  enoxaparin Injectable 40 milliGRAM(s) SubCutaneous daily    Allergies    No Known Allergies    Vital Signs Last 24 Hrs  T(C): 36.4 (07 Oct 2021 19:27), Max: 36.8 (07 Oct 2021 10:55)  T(F): 97.6 (07 Oct 2021 19:27), Max: 98.2 (07 Oct 2021 10:55)  HR: 76 (07 Oct 2021 19:27) (76 - 87)  BP: 164/78 (07 Oct 2021 19:44) (164/78 - 221/91)  RR: 15 (07 Oct 2021 19:27) (15 - 18)  SpO2: 98% (07 Oct 2021 19:27) (95% - 99%)    General Exam:  Constitutional: Lying on stretcher, NAD.  Head: Normocephalic, atraumatic.  Extremities: No edema.    Neuro Exam:   MS: Alert, eyes open spontaneously. Oriented to self, age, location, month, year. Speech is fluent, not slurred. Able to name objects and repeat. Follows commands.  CN: PERRL. VFF. EOMI. V1-V3 intact. Face symmetric. Tongue midline.   Motor: All extremities antigravity without drift. Proximal muscle groups 5/5 throughout.  Sensory: Intact to LT throughout. No extinction.  Reflexes: 2+ throughout.  Coordination: No dysmetria on FNF or on HTS bilaterally.  Gait: Deferred.    LABS:               15.5   8.71  )-----------( 185      ( 07 Oct 2021 11:22 )             45.9     10-07    141  |  104  |  22  ----------------------------<  139<H>  4.2   |  23  |  0.83    Ca    10.0      07 Oct 2021 11:22    TPro  7.5  /  Alb  4.4  /  TBili  0.4  /  DBili  x   /  AST  19  /  ALT  15  /  AlkPhos  77  10-07    RADIOLOGY:    -10/07 CTH: No hydrocephalus, acute intracranial hemorrhage, mass effect, or brain edema.          
Patient seen and evaluated @ bedside  Chief Complaint: Lightheadedness, unable to walk because of LE weakness    HPI:  86yoF w/ PMHx significant for HLD and hypothyroidism presents from home with complaints of B/L LE weakness since night prior to admission (night of10/6), first noticed when she woke up from sleep to use the bathroom. She was so weak at that point, that she was unable to stand, thereafter falling forward onto her knees, not hitting her head or losing consciousness. She was able to get back into bed by walking while holding onto her furniture, and this morning when she awoke, again to use the bathroom, she had the same symptoms, and was only able to make it to the bathroom this time by holding on to her walls. At baseline patient walks without assistance. She denies associated or recent CP, palpitations, focal neurologic changes, vision changes, numbness/tingling, seizure-like movements, tongue biting, fever/chills, cough, myalgia, abd pain, n/v/d/c, or bowel/bladder incontinence, but she does endorse lightheadedness and increased frequency of urination.    Patient denied associated nausea when at home.  Last night when staff was getting her into wheelchair to take to bathroom she had nausea and vomiting and given Zofran.    U/A consistent with UTI.  Patient has UTIs several times a year and never associated with current symptoms.    A prior CT head was done in [place] after a fall due to slipping.  This did reveal evidence of intracranial atherosclerotic chagnes, chronic small vessl disease and carotid calcific changes.    ED Presenting Vitals: 98F, 77bpm, 209/92 --> 167/65 (1hour later w/o intervention), 16br/m, 95% on RA.  BP has decreased subsequently      PMH:   Hypothyroidism    HLD (hyperlipidemia)    asthmatic bronchitis    one episode of syncope likely vagal    Right rotator cuff tear treated conservatively    Sciatica and lumbar spondylosis treated conservatively 2018    Diverticulosis    Chronic oral lichen planus        PSH:     Cholecystectomy 1991    Tubal ligation    BL cataracts 2012    Medications:   ALPRAZolam 0.5 milliGRAM(s) Oral at bedtime  aspirin enteric coated 81 milliGRAM(s) Oral daily  atorvastatin 20 milliGRAM(s) Oral at bedtime  cefTRIAXone   IVPB 1000 milliGRAM(s) IV Intermittent every 24 hours  chlorhexidine 2% Cloths 1 Application(s) Topical daily  enoxaparin Injectable 40 milliGRAM(s) SubCutaneous daily  folic acid 1 milliGRAM(s) Oral daily  levothyroxine 75 MICROGram(s) Oral daily  Nephro-salvatore 1 Tablet(s) Oral daily    Allergies:  No Known Allergies    FAMILY HISTORY:    Social History:    Smoking:  [place]    REVIEW OF SYSTEMS:  See HPI  CONSTITUTIONAL: + weakness, - fevers or chills  RESPIRATORY: No cough, wheezing, hemoptysis; No shortness of breath  CARDIOVASCULAR: No chest pain or palpitations  All other review of systems is negative unless indicated above    Physical Exam:  T(C): 36.8 (10-07-21 @ 22:52), Max: 36.8 (10-07-21 @ 10:55)  HR: 69 (10-07-21 @ 22:52) (69 - 87)  BP: 163/70 (10-08-21 @ 03:54) (151/77 - 221/91)  RR: 18 (10-07-21 @ 22:52) (15 - 18)  SpO2: 98% (10-07-21 @ 22:52) (95% - 99%)  Wt(kg): --    10-07 @ 07:01  -  10-08 @ 07:00  --------------------------------------------------------  IN: 120 mL / OUT: 800 mL / NET: -680 mL      Daily Height in cm: 152.4 (07 Oct 2021 10:28)    Daily     Appearance:  Normal, NAD  Eyes:  EOMI  HEENT: Normal oral mucosa NC/AT  Neck:  No JVD or HJR.  Carotids full + R carotid bruit  Respiratory: Clear to auscultation bilaterally  Cardiovascular: Normal S1 and S2 with faint early systolic murmur LSB.  No rubs or gallops  Abdomen:   BS normal, Soft,  Non-tender without organomegaly or masses  Extremities: Without edema, pulses are full      Labs:                        15.5   8.71  )-----------( 185      ( 07 Oct 2021 11:22 )             45.9     10-07    141  |  104  |  22  ----------------------------<  139<H>  4.2   |  23  |  0.83    Ca    10.0      07 Oct 2021 11:22    TPro  7.5  /  Alb  4.4  /  TBili  0.4  /  DBili  x   /  AST  19  /  ALT  15  /  AlkPhos  77  10-07    Trop 10 and repeat 10  Urinalysis + Microscopic Examination (10.07.21 @ 13:09)   Color: Yellow   Glucose Qualitative, Urine: Negative   Blood, Urine: Negative   Urobilinogen: Negative   Specific Gravity: 1.018   Urine Appearance: Turbid   Protein, Urine: Trace   pH Urine: 7.5   Leukocyte Esterase Concentration: Large   Nitrite: Negative   Ketone - Urine: Negative   Bilirubin: Negative   Red Blood Cell - Urine: 6 /hpf   White Blood Cell - Urine: 25 /HPF   Epithelial Cells: 2 /hpf   Hyaline Casts: 2 /lpf   Bacteria: Negative         ECG: NSR 66.  NOrmal axis and intervals.  Non-specific T wave abnormality.  COmpared to office EKG8/11/20, ther eis no significant change.    EXAM:  CT BRAIN                            PROCEDURE DATE:  10/07/2021            INTERPRETATION:  Noncontrast CT of the brain.    CLINICAL INDICATION:  Weakness in legs    TECHNIQUE : Axial CT scanning of the brain was obtained from the skull baseto the vertex without the administration of intravenous contrast. Sagittal and coronal reformats were provided.    COMPARISON: None available    FINDINGS:    No hydrocephalus, mass effect, midline shift, acute intracranial hemorrhage, or brain edema.    Mild white matter microvascular ischemic disease.    Visualized paranasal sinuses and mastoid air cells are clear.    IMPRESSION:    No hydrocephalus, acute intracranial hemorrhage, mass effect, or brain edema.      XAM:  XR CHEST PORTABLE URGENT 1V                            PROCEDURE DATE:  10/07/2021            INTERPRETATION:  EXAMINATION: XR CHEST URGENT    CLINICAL INFORMATION: Chest pain.    TECHNIQUE: Single frontal view of the chest was obtained.    COMPARISON: CT chest abdomen pelvis 12/18/2008.    FINDINGS:  Cholecystectomy.    Heart size cannot be accurately assessed in this single projection.  Left lower lung opacity secondary to overlying soft tissue. The lungs are clear.  There is no pneumothorax or large pleural effusion.    IMPRESSION:  Clear lungs

## 2021-10-08 NOTE — PHYSICAL THERAPY INITIAL EVALUATION ADULT - PLANNED THERAPY INTERVENTIONS, PT EVAL
stair neg: GOAL: pt will neg 12 steps 1 HR ind in 2wks./balance training/bed mobility training/gait training/transfer training

## 2021-10-08 NOTE — PHYSICAL THERAPY INITIAL EVALUATION ADULT - ADDITIONAL COMMENTS
pt lives at home alone in a [place], +1 step to enter through garage, +flight of stairs in home with railing, PTA ind amb and ADls, no device, socially active- had luncheon planned this week; +walk in shower.

## 2021-10-08 NOTE — PHYSICAL THERAPY INITIAL EVALUATION ADULT - PRECAUTIONS/LIMITATIONS, REHAB EVAL
She was able to get back into bed by walking while holding onto her furniture, and this morning when she awoke, again to use the bathroom, she had the same symptoms, and was only able to make it to the bathroom this time by holding on to her walls. At baseline pt walks without assistance. She denies associated or recent CP, palpitations, focal neurologic changes, vision changes, numbness/tingling, seizure-like movements, tongue biting, fever/chills, cough, myalgia, abd pain, n/v/d/c, or bowel/bladder incontinence, but she does endorse lightheadedness and increased frequency of urination. Hospital course: pt was found to have a UTI. BP also persistently elevated, SBP >200 on initial presentation. Patient denies history of HTN, although last checkup with PCP was about 1 year ago. +Lightheadedness in setting of acute infection and significant HTN. Syncopal workup pending. No suspicion of seizure. A prior CT head was done in [place] after a fall due to slipping.  This did reveal evidence of intracranial atherosclerotic chagnes, chronic small vessl disease and carotid calcific changes.Per cardiolgy, R/O vertebrobasilar insufficiency, planning for carotid duplex/MRI/MRA head. CTH: No hydrocephalus, acute intracranial hemorrhage, mass effect, or brain edema. CXR: clear lungs She was able to get back into bed by walking while holding onto her furniture, and this morning when she awoke, again to use the bathroom, she had the same symptoms, and was only able to make it to the bathroom this time by holding on to her walls. At baseline pt walks without assistance. She denies associated or recent CP, palpitations, focal neurologic changes, vision changes, numbness/tingling, seizure-like movements, tongue biting, fever/chills, cough, myalgia, abd pain, n/v/d/c, or bowel/bladder incontinence, but she does endorse lightheadedness and increased frequency of urination. Hospital course: pt was found to have a UTI. BP also persistently elevated, SBP >200 on initial presentation. Patient denies history of HTN, although last checkup with PCP was about 1 year ago. +Lightheadedness in setting of acute infection and significant HTN. Syncopal workup pending. No suspicion of seizure. A prior CT head was done in [place] after a fall due to slipping.  This did reveal evidence of intracranial atherosclerotic chagnes, chronic small vessl disease and carotid calcific changes.Per cardiolgy, R/O vertebrobasilar insufficiency, planning for carotid duplex/MRI/MRA head. CTH: No hydrocephalus, acute intracranial hemorrhage, mass effect, or brain edema. CXR: clear lungs/fall precautions

## 2021-10-08 NOTE — PHYSICAL THERAPY INITIAL EVALUATION ADULT - GENERAL OBSERVATIONS, REHAB EVAL
Pt a/w BLE weakness, dizziness, +UTI. Pt received supine in bed, +IVL, A&Ox4, pleasant and cooperative, can get anxious at times during mobility.

## 2021-10-09 LAB
ANION GAP SERPL CALC-SCNC: 15 MMOL/L — SIGNIFICANT CHANGE UP (ref 5–17)
BUN SERPL-MCNC: 12 MG/DL — SIGNIFICANT CHANGE UP (ref 7–23)
CALCIUM SERPL-MCNC: 9.8 MG/DL — SIGNIFICANT CHANGE UP (ref 8.4–10.5)
CHLORIDE SERPL-SCNC: 102 MMOL/L — SIGNIFICANT CHANGE UP (ref 96–108)
CO2 SERPL-SCNC: 21 MMOL/L — LOW (ref 22–31)
CREAT SERPL-MCNC: 0.79 MG/DL — SIGNIFICANT CHANGE UP (ref 0.5–1.3)
CULTURE RESULTS: SIGNIFICANT CHANGE UP
GLUCOSE SERPL-MCNC: 89 MG/DL — SIGNIFICANT CHANGE UP (ref 70–99)
HCT VFR BLD CALC: 45.4 % — HIGH (ref 34.5–45)
HGB BLD-MCNC: 15.4 G/DL — SIGNIFICANT CHANGE UP (ref 11.5–15.5)
MCHC RBC-ENTMCNC: 31.6 PG — SIGNIFICANT CHANGE UP (ref 27–34)
MCHC RBC-ENTMCNC: 33.9 GM/DL — SIGNIFICANT CHANGE UP (ref 32–36)
MCV RBC AUTO: 93 FL — SIGNIFICANT CHANGE UP (ref 80–100)
NRBC # BLD: 0 /100 WBCS — SIGNIFICANT CHANGE UP (ref 0–0)
PLATELET # BLD AUTO: 172 K/UL — SIGNIFICANT CHANGE UP (ref 150–400)
POTASSIUM SERPL-MCNC: 4 MMOL/L — SIGNIFICANT CHANGE UP (ref 3.5–5.3)
POTASSIUM SERPL-SCNC: 4 MMOL/L — SIGNIFICANT CHANGE UP (ref 3.5–5.3)
RBC # BLD: 4.88 M/UL — SIGNIFICANT CHANGE UP (ref 3.8–5.2)
RBC # FLD: 12 % — SIGNIFICANT CHANGE UP (ref 10.3–14.5)
SODIUM SERPL-SCNC: 138 MMOL/L — SIGNIFICANT CHANGE UP (ref 135–145)
SPECIMEN SOURCE: SIGNIFICANT CHANGE UP
WBC # BLD: 9.51 K/UL — SIGNIFICANT CHANGE UP (ref 3.8–10.5)
WBC # FLD AUTO: 9.51 K/UL — SIGNIFICANT CHANGE UP (ref 3.8–10.5)

## 2021-10-09 PROCEDURE — 99231 SBSQ HOSP IP/OBS SF/LOW 25: CPT

## 2021-10-09 RX ADMIN — Medication 0.5 MILLIGRAM(S): at 22:52

## 2021-10-09 RX ADMIN — Medication 1 MILLIGRAM(S): at 11:28

## 2021-10-09 RX ADMIN — ENOXAPARIN SODIUM 40 MILLIGRAM(S): 100 INJECTION SUBCUTANEOUS at 11:27

## 2021-10-09 RX ADMIN — Medication 75 MICROGRAM(S): at 05:07

## 2021-10-09 RX ADMIN — Medication 81 MILLIGRAM(S): at 11:28

## 2021-10-09 RX ADMIN — ATORVASTATIN CALCIUM 20 MILLIGRAM(S): 80 TABLET, FILM COATED ORAL at 22:51

## 2021-10-09 RX ADMIN — Medication 1 TABLET(S): at 11:28

## 2021-10-09 RX ADMIN — CEFTRIAXONE 100 MILLIGRAM(S): 500 INJECTION, POWDER, FOR SOLUTION INTRAMUSCULAR; INTRAVENOUS at 13:37

## 2021-10-09 NOTE — PROGRESS NOTE ADULT - SUBJECTIVE AND OBJECTIVE BOX
CC: urinary frequency  HPI:  86yoF w/ PMHx significant for HLD and hypothyroidism presents from home with complaints of B/L LE weakness since last night, first noticed when she woke up from sleep to use the bathroom. She was so weak at that point, that she was unable to stand, thereafter falling forward onto her knees, not hitting her head or losing consciousness. She was able to get back into bed by walking while holding onto her furniture, and this morning when she awoke, again to use the bathroom, she had the same symptoms, and was only able to make it to the bathroom this time by holding on to her walls. At baseline patient walks without assistance. She denies associated or recent CP, palpitations, focal neurologic changes, vision changes, numbness/tingling, seizure-like movements, tongue biting, fever/chills, cough, myalgia, abd pain, n/v/d/c, or bowel/bladder incontinence, but she does endorse lightheadedness and increased frequency of urination.    ED Presenting Vitals: 89F, 77bpm, 209/92 --> 167/65 (1hour later w/o intervention), 16br/m, 95% on RA  ED Course: s/p Ceftriaxone 1g IV x1; UCx sent by ED (07 Oct 2021 18:02)    Review Of Systems:     No chest pain, shortness of breath, or palpitations            Medications:  ALPRAZolam 0.5 milliGRAM(s) Oral at bedtime  aspirin enteric coated 81 milliGRAM(s) Oral daily  atorvastatin 20 milliGRAM(s) Oral at bedtime  cefTRIAXone   IVPB 1000 milliGRAM(s) IV Intermittent every 24 hours  chlorhexidine 2% Cloths 1 Application(s) Topical daily  enoxaparin Injectable 40 milliGRAM(s) SubCutaneous daily  folic acid 1 milliGRAM(s) Oral daily  levothyroxine 75 MICROGram(s) Oral daily  Nephro-salvatore 1 Tablet(s) Oral daily    PAST MEDICAL & SURGICAL HISTORY:  Hypothyroidism    HLD (hyperlipidemia)      Vitals:  T(C): 36.9 (10-09-21 @ 05:50), Max: 36.9 (10-09-21 @ 05:50)  HR: 83 (10-08-21 @ 23:56) (74 - 83)  BP: 176/78 (10-08-21 @ 23:56) (176/78 - 176/78)  BP(mean): --  RR: 18 (10-08-21 @ 23:56) (18 - 18)  SpO2: 96% (10-08-21 @ 23:56) (95% - 98%)  Wt(kg): --  Daily     Daily   I&O's Summary    08 Oct 2021 07:01  -  09 Oct 2021 07:00  --------------------------------------------------------  IN: 290 mL / OUT: 200 mL / NET: 90 mL        Physical Exam:  Appearance: No acute distress; well appearing  Eyes: PERRL, EOMI, pink conjunctiva  HENT: Normal oral mucosa  Cardiovascular: RRR, S1, S2, no murmurs, rubs, or gallops; no edema; no JVD  Respiratory: Clear to auscultation bilaterally  Gastrointestinal: soft, non-tender, non-distended with normal bowel sounds  Musculoskeletal: No clubbing; no joint deformity   Neurologic: Non-focal  Lymphatic: No lymphadenopathy  Psychiatry: AAOx3, mood & affect appropriate  Skin: No rashes, ecchymoses, or cyanosis                          15.4   9.51  )-----------( 172      ( 09 Oct 2021 07:28 )             45.4     10-08    140  |  100  |  13  ----------------------------<  118<H>  4.0   |  23  |  0.78    Ca    9.3      08 Oct 2021 10:39    TPro  7.5  /  Alb  4.4  /  TBili  0.4  /  DBili  x   /  AST  19  /  ALT  15  /  AlkPhos  77  10-07                  ECG:    Echo:    Stress Testing:     Cath:    Imaging:    Interpretation of Telemetry:

## 2021-10-10 PROCEDURE — 70551 MRI BRAIN STEM W/O DYE: CPT | Mod: 26

## 2021-10-10 PROCEDURE — 99231 SBSQ HOSP IP/OBS SF/LOW 25: CPT

## 2021-10-10 RX ORDER — POLYETHYLENE GLYCOL 3350 17 G/17G
17 POWDER, FOR SOLUTION ORAL ONCE
Refills: 0 | Status: COMPLETED | OUTPATIENT
Start: 2021-10-10 | End: 2021-10-11

## 2021-10-10 RX ADMIN — Medication 0.5 MILLIGRAM(S): at 22:06

## 2021-10-10 RX ADMIN — ENOXAPARIN SODIUM 40 MILLIGRAM(S): 100 INJECTION SUBCUTANEOUS at 12:19

## 2021-10-10 RX ADMIN — Medication 81 MILLIGRAM(S): at 12:19

## 2021-10-10 RX ADMIN — CHLORHEXIDINE GLUCONATE 1 APPLICATION(S): 213 SOLUTION TOPICAL at 12:22

## 2021-10-10 RX ADMIN — Medication 1 TABLET(S): at 12:19

## 2021-10-10 RX ADMIN — Medication 1 MILLIGRAM(S): at 12:19

## 2021-10-10 RX ADMIN — ATORVASTATIN CALCIUM 20 MILLIGRAM(S): 80 TABLET, FILM COATED ORAL at 22:05

## 2021-10-10 RX ADMIN — Medication 75 MICROGRAM(S): at 05:59

## 2021-10-10 NOTE — PROGRESS NOTE ADULT - SUBJECTIVE AND OBJECTIVE BOX
CC: Urinating every 2 hours, constipation  for 3 days  HPI:  86yoF w/ PMHx significant for HLD and hypothyroidism presents from home with complaints of B/L LE weakness since last night, first noticed when she woke up from sleep to use the bathroom. She was so weak at that point, that she was unable to stand, thereafter falling forward onto her knees, not hitting her head or losing consciousness. She was able to get back into bed by walking while holding onto her furniture, and this morning when she awoke, again to use the bathroom, she had the same symptoms, and was only able to make it to the bathroom this time by holding on to her walls. At baseline patient walks without assistance. She denies associated or recent CP, palpitations, focal neurologic changes, vision changes, numbness/tingling, seizure-like movements, tongue biting, fever/chills, cough, myalgia, abd pain, n/v/d/c, or bowel/bladder incontinence, but she does endorse lightheadedness and increased frequency of urination.    ED Presenting Vitals: 98F, 77bpm, 209/92 --> 167/65 (1hour later w/o intervention), 16br/m, 95% on RA  ED Course: s/p Ceftriaxone 1g IV x1; UCx sent by ED (07 Oct 2021 18:02)    Review Of Systems:     No chest pain, shortness of breath, or palpitations            Medications:  ALPRAZolam 0.5 milliGRAM(s) Oral at bedtime  aspirin enteric coated 81 milliGRAM(s) Oral daily  atorvastatin 20 milliGRAM(s) Oral at bedtime  chlorhexidine 2% Cloths 1 Application(s) Topical daily  enoxaparin Injectable 40 milliGRAM(s) SubCutaneous daily  folic acid 1 milliGRAM(s) Oral daily  levothyroxine 75 MICROGram(s) Oral daily  Nephro-salvatore 1 Tablet(s) Oral daily  polyethylene glycol 3350 17 Gram(s) Oral once PRN    PAST MEDICAL & SURGICAL HISTORY:  Hypothyroidism    HLD (hyperlipidemia)      Vitals:  T(C): 36.4 (10-10-21 @ 05:42), Max: 37 (10-09-21 @ 13:00)  HR: 56 (10-10-21 @ 05:42) (56 - 65)  BP: 126/73 (10-10-21 @ 05:42) (126/73 - 149/74)  BP(mean): --  RR: 18 (10-10-21 @ 05:42) (18 - 18)  SpO2: 98% (10-10-21 @ 05:42) (96% - 98%)  Wt(kg): --  Daily     Daily   I&O's Summary    09 Oct 2021 07:01  -  10 Oct 2021 07:00  --------------------------------------------------------  IN: 370 mL / OUT: 0 mL / NET: 370 mL        Physical Exam:  Appearance: No acute distress; well appearing  Eyes: PERRL, EOMI, pink conjunctiva  HENT: Normal oral mucosa  Cardiovascular: RRR, S1, S2, no murmurs, rubs, or gallops; no edema; no JVD  Respiratory: Clear to auscultation bilaterally  Gastrointestinal: soft, non-tender, non-distended with normal bowel sounds  Musculoskeletal: No clubbing; no joint deformity   Neurologic: Non-focal  Lymphatic: No lymphadenopathy  Psychiatry: AAOx3, mood & affect appropriate  Skin: No rashes, ecchymoses, or cyanosis                          15.4   9.51  )-----------( 172      ( 09 Oct 2021 07:28 )             45.4     10-09    138  |  102  |  12  ----------------------------<  89  4.0   |  21<L>  |  0.79    Ca    9.8      09 Oct 2021 07:28                    ECG:    Echo:    Stress Testing:     Cath:    Imaging:    Interpretation of Telemetry:

## 2021-10-11 LAB
ANION GAP SERPL CALC-SCNC: 15 MMOL/L — SIGNIFICANT CHANGE UP (ref 5–17)
BUN SERPL-MCNC: 14 MG/DL — SIGNIFICANT CHANGE UP (ref 7–23)
CALCIUM SERPL-MCNC: 10 MG/DL — SIGNIFICANT CHANGE UP (ref 8.4–10.5)
CHLORIDE SERPL-SCNC: 103 MMOL/L — SIGNIFICANT CHANGE UP (ref 96–108)
CO2 SERPL-SCNC: 24 MMOL/L — SIGNIFICANT CHANGE UP (ref 22–31)
CREAT SERPL-MCNC: 0.92 MG/DL — SIGNIFICANT CHANGE UP (ref 0.5–1.3)
CULTURE RESULTS: SIGNIFICANT CHANGE UP
GLUCOSE SERPL-MCNC: 102 MG/DL — HIGH (ref 70–99)
HCT VFR BLD CALC: 49 % — HIGH (ref 34.5–45)
HGB BLD-MCNC: 16 G/DL — HIGH (ref 11.5–15.5)
MCHC RBC-ENTMCNC: 31.1 PG — SIGNIFICANT CHANGE UP (ref 27–34)
MCHC RBC-ENTMCNC: 32.7 GM/DL — SIGNIFICANT CHANGE UP (ref 32–36)
MCV RBC AUTO: 95.3 FL — SIGNIFICANT CHANGE UP (ref 80–100)
NRBC # BLD: 0 /100 WBCS — SIGNIFICANT CHANGE UP (ref 0–0)
PLATELET # BLD AUTO: 177 K/UL — SIGNIFICANT CHANGE UP (ref 150–400)
POTASSIUM SERPL-MCNC: 5 MMOL/L — SIGNIFICANT CHANGE UP (ref 3.5–5.3)
POTASSIUM SERPL-SCNC: 5 MMOL/L — SIGNIFICANT CHANGE UP (ref 3.5–5.3)
RBC # BLD: 5.14 M/UL — SIGNIFICANT CHANGE UP (ref 3.8–5.2)
RBC # FLD: 12.5 % — SIGNIFICANT CHANGE UP (ref 10.3–14.5)
SODIUM SERPL-SCNC: 142 MMOL/L — SIGNIFICANT CHANGE UP (ref 135–145)
SPECIMEN SOURCE: SIGNIFICANT CHANGE UP
WBC # BLD: 8.41 K/UL — SIGNIFICANT CHANGE UP (ref 3.8–10.5)
WBC # FLD AUTO: 8.41 K/UL — SIGNIFICANT CHANGE UP (ref 3.8–10.5)

## 2021-10-11 PROCEDURE — 93880 EXTRACRANIAL BILAT STUDY: CPT | Mod: 26

## 2021-10-11 RX ORDER — POLYETHYLENE GLYCOL 3350 17 G/17G
17 POWDER, FOR SOLUTION ORAL DAILY
Refills: 0 | Status: DISCONTINUED | OUTPATIENT
Start: 2021-10-11 | End: 2021-10-12

## 2021-10-11 RX ORDER — ZINC SULFATE TAB 220 MG (50 MG ZINC EQUIVALENT) 220 (50 ZN) MG
0 TAB ORAL
Qty: 0 | Refills: 0 | DISCHARGE

## 2021-10-11 RX ORDER — SENNA PLUS 8.6 MG/1
2 TABLET ORAL AT BEDTIME
Refills: 0 | Status: DISCONTINUED | OUTPATIENT
Start: 2021-10-11 | End: 2021-10-12

## 2021-10-11 RX ADMIN — Medication 81 MILLIGRAM(S): at 11:16

## 2021-10-11 RX ADMIN — POLYETHYLENE GLYCOL 3350 17 GRAM(S): 17 POWDER, FOR SOLUTION ORAL at 11:17

## 2021-10-11 RX ADMIN — ENOXAPARIN SODIUM 40 MILLIGRAM(S): 100 INJECTION SUBCUTANEOUS at 11:16

## 2021-10-11 RX ADMIN — SENNA PLUS 2 TABLET(S): 8.6 TABLET ORAL at 22:04

## 2021-10-11 RX ADMIN — POLYETHYLENE GLYCOL 3350 17 GRAM(S): 17 POWDER, FOR SOLUTION ORAL at 06:33

## 2021-10-11 RX ADMIN — Medication 1 MILLIGRAM(S): at 11:16

## 2021-10-11 RX ADMIN — Medication 1 TABLET(S): at 11:18

## 2021-10-11 RX ADMIN — CHLORHEXIDINE GLUCONATE 1 APPLICATION(S): 213 SOLUTION TOPICAL at 11:17

## 2021-10-11 RX ADMIN — Medication 75 MICROGRAM(S): at 06:20

## 2021-10-11 RX ADMIN — Medication 0.5 MILLIGRAM(S): at 22:04

## 2021-10-11 RX ADMIN — ATORVASTATIN CALCIUM 20 MILLIGRAM(S): 80 TABLET, FILM COATED ORAL at 22:04

## 2021-10-11 NOTE — DISCHARGE NOTE PROVIDER - CARE PROVIDER_API CALL
Tim Gross  CARDIOVASCULAR DISEASE  [facility], [place] [address]  Phone: [phone]  Fax: [phone]  Follow Up Time:

## 2021-10-11 NOTE — DISCHARGE NOTE PROVIDER - NSDCMRMEDTOKEN_GEN_ALL_CORE_FT
ALPRAZolam 1 mg oral tablet: 0.5 tab(s) orally once a day  Aspirin Enteric Coated 81 mg oral delayed release tablet: 1 tab(s) orally once a day  bisacodyl 10 mg rectal suppository: 1 suppository(ies) rectal once a day, As Needed  Colace 100 mg oral capsule: 1 cap(s) orally , As Needed  folic acid:   levothyroxine 75 mcg (0.075 mg) oral tablet: 1 tab(s) orally once a day  magnesium:   multivitamin:   ocular lubricant ophthalmic solution: 1 drop(s) to each affected eye 2 times a day  pravastatin 80 mg oral tablet: 1 tab(s) orally once a day  Vitamin D3:    ALPRAZolam 1 mg oral tablet: 0.5 tab(s) orally once a day  Aspirin Enteric Coated 81 mg oral delayed release tablet: 1 tab(s) orally once a day  bisacodyl 10 mg rectal suppository: 1 suppository(ies) rectal once a day, As Needed  Colace 100 mg oral capsule: 1 cap(s) orally , As Needed  folic acid:   Home Physical Therapy : once a day   levothyroxine 75 mcg (0.075 mg) oral tablet: 1 tab(s) orally once a day  magnesium:   multivitamin:   ocular lubricant ophthalmic solution: 1 drop(s) to each affected eye 2 times a day  pravastatin 80 mg oral tablet: 1 tab(s) orally once a day  Rolling walker :   Vitamin D3:

## 2021-10-11 NOTE — PROGRESS NOTE ADULT - SUBJECTIVE AND OBJECTIVE BOX
Neurology Follow up note    Name  ALFREDO HANEY    HPI:  86yoF w/ PMHx significant for HLD and hypothyroidism presents from home with complaints of B/L LE weakness since last night, first noticed when she woke up from sleep to use the bathroom. She was so weak at that point, that she was unable to stand, thereafter falling forward onto her knees, not hitting her head or losing consciousness. She was able to get back into bed by walking while holding onto her furniture, and this morning when she awoke, again to use the bathroom, she had the same symptoms, and was only able to make it to the bathroom this time by holding on to her walls. At baseline patient walks without assistance. She denies associated or recent CP, palpitations, focal neurologic changes, vision changes, numbness/tingling, seizure-like movements, tongue biting, fever/chills, cough, myalgia, abd pain, n/v/d/c, or bowel/bladder incontinence, but she does endorse lightheadedness and increased frequency of urination.    ED Presenting Vitals: 89F, 77bpm, 209/92 --> 167/65 (1hour later w/o intervention), 16br/m, 95% on RA  ED Course: s/p Ceftriaxone 1g IV x1; UCx sent by ED (07 Oct 2021 18:02)      Interval History - Patient seen and examined this am.             Vital Signs Last 24 Hrs  T(C): 36.9 (11 Oct 2021 08:26), Max: 37.1 (10 Oct 2021 16:14)  T(F): 98.4 (11 Oct 2021 08:26), Max: 98.7 (10 Oct 2021 16:14)  HR: 60 (11 Oct 2021 08:26) (60 - 71)  BP: 121/73 (11 Oct 2021 08:26) (121/73 - 150/78)  BP(mean): --  RR: 18 (11 Oct 2021 08:26) (16 - 18)  SpO2: 96% (11 Oct 2021 08:26) (96% - 98%)    PHYSICAL EXAM:      Neurological Exam:  Mental Status - Patient is alert, awake, oriented X3. fluent, names, no dysarthria no aphasia Follows commands well and able to answer questions appropriately. Mood and affect  normal    Cranial Nerves - PERRL, EOMI, VFF, V1-V3 intact, no gross facial asymmetry, tongue/uvula midline    Motor Exam -   Right upper 5/5   Left upper 5/5  Right lower 5/5  Left lower 5/5     nml bulk/tone    Sensory    Intact to light touch and pinprick bilaterally    Coord: FTN intact bilaterally     Gait -  wide based ? magnetic        Medications  ALPRAZolam 0.5 milliGRAM(s) Oral at bedtime  aspirin enteric coated 81 milliGRAM(s) Oral daily  atorvastatin 20 milliGRAM(s) Oral at bedtime  chlorhexidine 2% Cloths 1 Application(s) Topical daily  enoxaparin Injectable 40 milliGRAM(s) SubCutaneous daily  folic acid 1 milliGRAM(s) Oral daily  levothyroxine 75 MICROGram(s) Oral daily  Nephro-salvatore 1 Tablet(s) Oral daily  polyethylene glycol 3350 17 Gram(s) Oral daily  senna 2 Tablet(s) Oral at bedtime      Lab      Radiology  < from: MR Head No Cont (10.10.21 @ 11:39) >  IMPRESSION: No acute intracranial hemorrhage or evidence of acute ischemia.    Unchanged imaging findings for which normal pressure hydrocephalus can be considered. Clinical correlation is acquired for this diagnosis.    Multiple patchy confluent nonspecific abnormal white matter foci of T2/FLAIR prolongation statistically favoring microvascular disease.      --- End of Report ---    < end of copied text >

## 2021-10-11 NOTE — PROGRESS NOTE ADULT - SUBJECTIVE AND OBJECTIVE BOX
No c/o.  Ambulated with physical therapy and patient states no imbalance.    Urinary symptoms of urgency and some discomfort almost all improved.  Urine culture > 3 organisms - contaminated.    Patient with mild orthostatic changes but no hypotension with standing.      [facility] MRI    PROCEDURE DATE:  10/10/2021          INTERPRETATION:  .    CLINICAL INFORMATION: Urinary tract infection. Weakness.    TECHNIQUE: Multiplanar multisequential MRI of the brain was acquired without the administration of IV gadolinium.    COMPARISON: Prior CT examination of the head dated 10/7/2021. No prior brain MRI exams are available for comparison.    FINDINGS: Multiple patchy confluent nonspecific foci of T2/FLAIR hyperintensity are noted throughout the deep and periventricular white matter of the cerebral hemispheres. There is no associated mass effect. There is no evidence of acute ischemia on the diffusion-weighted images.    There is diffuse cerebral volume loss with prominence of the sulci, fissures, and cisternal spaces which is normal for the patient's age.    Mild ventriculomegaly appears unchanged. This appears slightly out of proportion to sulcal prominence. There is chronic of the cerebral sulci at the vertex. The callosal angleappears sharp at the level of the posterior commissure measured in the coronal plane.    Flow-voids are noted throughout the major intracranial vessels, on the T2 weighted images, consistent with their patency. The sellar region and posterior fossa appear unremarkable.    The paranasal sinuses and mastoid air cells are clear. Calvarial signal is within normal limits. There is evidence of bilateral cataract removal.    IMPRESSION: No acute intracranial hemorrhage or evidence of acute ischemia.    Unchanged imaging findings for which normal pressure hydrocephalus can be considered. Clinical correlation is acquired for this diagnosis.    Multiple patchy confluent nonspecific abnormal white matter foci of T2/FLAIR prolongation statistically favoring microvascular disease.      --- End of Report ---                CORA OJEDA MD; Attending Radiologist  This document has been electronically signed. Oct 10 2021  1:34PM          REVIEW OF SYSTEMS:  CARDIOVASCULAR: No chest pain, dyspnea or palpitations  All other review of systems is negative unless indicated above    Medications:  ALPRAZolam 0.5 milliGRAM(s) Oral at bedtime  aspirin enteric coated 81 milliGRAM(s) Oral daily  atorvastatin 20 milliGRAM(s) Oral at bedtime  chlorhexidine 2% Cloths 1 Application(s) Topical daily  enoxaparin Injectable 40 milliGRAM(s) SubCutaneous daily  folic acid 1 milliGRAM(s) Oral daily  levothyroxine 75 MICROGram(s) Oral daily  Nephro-salvatore 1 Tablet(s) Oral daily      Physical Exam:  Vitals:  T(C): 36.4 (10-11-21 @ 05:23), Max: 37.1 (10-10-21 @ 16:14)  HR: 71 (10-11-21 @ 00:07) (64 - 71)  BP: 150/78 (10-11-21 @ 00:07) (150/78 - 150/78)  BP(mean): --  RR: 16 (10-11-21 @ 05:23) (16 - 16)  SpO2: 96% (10-11-21 @ 05:23) (96% - 98%)  Wt(kg): --  Daily     Daily   I&O's Summary    10 Oct 2021 07:01  -  11 Oct 2021 07:00  --------------------------------------------------------  IN: 620 mL / OUT: 400 mL / NET: 220 mL      Appearance:  Normal, NAD  Eyes:  EOMI  HEENT: Normal oral mucosa NC/AT  Neck:  No JVD or HJR.  Carotids full + R carotid bruit  Respiratory: Clear to auscultation bilaterally  Cardiovascular: Normal S1 and S2 with faint early systolic murmur LSB.  No rubs or gallops  Abdomen:   BS normal, Soft,  Non-tender without organomegaly or masses  Extremities: Without edema

## 2021-10-11 NOTE — DISCHARGE NOTE PROVIDER - NSDCCPCAREPLAN_GEN_ALL_CORE_FT
PRINCIPAL DISCHARGE DIAGNOSIS  Diagnosis: Acute UTI  Assessment and Plan of Treatment: You had a bladder &/or kidney infection  Call your doctor with pain or burning with urination, frequent urination, feeling to urinate suddenly, blood in urine, fever, back pain, nausea or vomiting  You need to take and finish your antibiotic as prescribed so that the infection does not reoccur  Drink adequate fluids - there is no harm to try cranberry juice  Follow up with PCP   take meds as directed

## 2021-10-11 NOTE — DISCHARGE NOTE PROVIDER - HOSPITAL COURSE
86yoF w/ PMHx significant for HLD and hypothyroidism here with lightheadedness improved and mildly unsteady gait PE non-focal other than wide based slightlly magentic gait. MRI as above with ventriculomegaly and atrophy  UTI weakness completed dose abx   Neuro consulted   Cardiology consulted   MRI findings more consistent with chronic changes than NPH, no clear DESH. Regardless, patient would not want intervention even if there was NPH    DCP with med rec discussed with Dr Gross PT is cleared for DC   Follow up with Dr Tapia 86yoF w/ PMHx significant for HLD and hypothyroidism here with lightheadedness improved and mildly unsteady gait PE non-focal other than wide based slightlly magentic gait. MRI as above with ventriculomegaly and atrophy  UTI weakness completed dose abx   Neuro consulted   Cardiology consulted   MRI findings more consistent with chronic changes than NPH, no clear DESH. Regardless, patient would not want intervention even if there was NPH    DCP with med rec discussed with Dr Gross PT is cleared for DC with home rehab  Follow up with Dr Tapia

## 2021-10-12 ENCOUNTER — TRANSCRIPTION ENCOUNTER (OUTPATIENT)
Age: 86
End: 2021-10-12

## 2021-10-12 VITALS
OXYGEN SATURATION: 94 % | HEART RATE: 57 BPM | SYSTOLIC BLOOD PRESSURE: 129 MMHG | RESPIRATION RATE: 18 BRPM | DIASTOLIC BLOOD PRESSURE: 61 MMHG | TEMPERATURE: 98 F

## 2021-10-12 LAB
ANION GAP SERPL CALC-SCNC: 11 MMOL/L — SIGNIFICANT CHANGE UP (ref 5–17)
BUN SERPL-MCNC: 20 MG/DL — SIGNIFICANT CHANGE UP (ref 7–23)
CALCIUM SERPL-MCNC: 9.9 MG/DL — SIGNIFICANT CHANGE UP (ref 8.4–10.5)
CHLORIDE SERPL-SCNC: 101 MMOL/L — SIGNIFICANT CHANGE UP (ref 96–108)
CO2 SERPL-SCNC: 25 MMOL/L — SIGNIFICANT CHANGE UP (ref 22–31)
CREAT SERPL-MCNC: 0.94 MG/DL — SIGNIFICANT CHANGE UP (ref 0.5–1.3)
GLUCOSE SERPL-MCNC: 107 MG/DL — HIGH (ref 70–99)
HCT VFR BLD CALC: 47.7 % — HIGH (ref 34.5–45)
HGB BLD-MCNC: 16 G/DL — HIGH (ref 11.5–15.5)
MCHC RBC-ENTMCNC: 31.7 PG — SIGNIFICANT CHANGE UP (ref 27–34)
MCHC RBC-ENTMCNC: 33.5 GM/DL — SIGNIFICANT CHANGE UP (ref 32–36)
MCV RBC AUTO: 94.6 FL — SIGNIFICANT CHANGE UP (ref 80–100)
NRBC # BLD: 0 /100 WBCS — SIGNIFICANT CHANGE UP (ref 0–0)
PLATELET # BLD AUTO: 193 K/UL — SIGNIFICANT CHANGE UP (ref 150–400)
POTASSIUM SERPL-MCNC: 4.7 MMOL/L — SIGNIFICANT CHANGE UP (ref 3.5–5.3)
POTASSIUM SERPL-SCNC: 4.7 MMOL/L — SIGNIFICANT CHANGE UP (ref 3.5–5.3)
RBC # BLD: 5.04 M/UL — SIGNIFICANT CHANGE UP (ref 3.8–5.2)
RBC # FLD: 12.3 % — SIGNIFICANT CHANGE UP (ref 10.3–14.5)
SODIUM SERPL-SCNC: 137 MMOL/L — SIGNIFICANT CHANGE UP (ref 135–145)
WBC # BLD: 11.77 K/UL — HIGH (ref 3.8–10.5)
WBC # FLD AUTO: 11.77 K/UL — HIGH (ref 3.8–10.5)

## 2021-10-12 RX ADMIN — ENOXAPARIN SODIUM 40 MILLIGRAM(S): 100 INJECTION SUBCUTANEOUS at 11:20

## 2021-10-12 RX ADMIN — POLYETHYLENE GLYCOL 3350 17 GRAM(S): 17 POWDER, FOR SOLUTION ORAL at 11:20

## 2021-10-12 RX ADMIN — Medication 81 MILLIGRAM(S): at 11:21

## 2021-10-12 RX ADMIN — CHLORHEXIDINE GLUCONATE 1 APPLICATION(S): 213 SOLUTION TOPICAL at 11:23

## 2021-10-12 RX ADMIN — Medication 1 TABLET(S): at 11:21

## 2021-10-12 RX ADMIN — Medication 75 MICROGRAM(S): at 05:17

## 2021-10-12 RX ADMIN — Medication 1 MILLIGRAM(S): at 11:20

## 2021-10-12 NOTE — PROGRESS NOTE ADULT - SUBJECTIVE AND OBJECTIVE BOX
No c/o lightheadedness or difficultly ambulating.  Still with some constipation.    Neurology follow up noticed ? magnetic gait.    Carotids without significant abnormality and vertebral low antegrade.        REVIEW OF SYSTEMS:  CARDIOVASCULAR: No chest pain, dyspnea or palpitations  All other review of systems is negative unless indicated above    Medications:  ALPRAZolam 0.5 milliGRAM(s) Oral at bedtime  aspirin enteric coated 81 milliGRAM(s) Oral daily  atorvastatin 20 milliGRAM(s) Oral at bedtime  chlorhexidine 2% Cloths 1 Application(s) Topical daily  enoxaparin Injectable 40 milliGRAM(s) SubCutaneous daily  folic acid 1 milliGRAM(s) Oral daily  levothyroxine 75 MICROGram(s) Oral daily  Nephro-salvatore 1 Tablet(s) Oral daily  polyethylene glycol 3350 17 Gram(s) Oral daily  senna 2 Tablet(s) Oral at bedtime      Physical Exam:  Vitals:  T(C): 36.7 (10-12-21 @ 00:20), Max: 37.1 (10-11-21 @ 16:24)  HR: 65 (10-12-21 @ 00:20) (65 - 73)  BP: 130/69 (10-12-21 @ 00:20) (123/68 - 130/69)  BP(mean): --  RR: 18 (10-12-21 @ 00:20) (18 - 18)  SpO2: 96% (10-12-21 @ 00:20) (95% - 96%)  Wt(kg): --  Daily     Daily   I&O's Summary    11 Oct 2021 07:01  -  12 Oct 2021 07:00  --------------------------------------------------------  IN: 800 mL / OUT: 0 mL / NET: 800 mL        Appearance:  Normal, NAD  Eyes:  EOMI  HEENT: Normal oral mucosa NC/AT  Neck:  No JVD.    Respiratory: Clear to auscultation bilaterally  Cardiovascular: Normal S1 and S2 with faint early systolic murmur LSB.  No rubs or gallops  Abdomen:   BS normal, Soft,  Non-tender without organomegaly or masses  Extremities: Without edema        10-12    Complete Blood Count in AM (10.12.21 @ 04:54)   Nucleated RBC: 0 /100 WBCs   WBC Count: 11.77 K/uL   RBC Count: 5.04 M/uL   Hemoglobin: 16.0 g/dL   Hematocrit: 47.7 %   Mean Cell Volume: 94.6 fl   Mean Cell Hemoglobin: 31.7 pg   Mean Cell Hemoglobin Conc: 33.5 gm/dL   Red Cell Distrib Width: 12.3 %   Platelet Count - Automated: 193 K/uL     137  |  101  |  20  ----------------------------<  107<H>  4.7   |  25  |  0.94    Ca    9.9      12 Oct 2021 04:54

## 2021-10-12 NOTE — DISCHARGE NOTE NURSING/CASE MANAGEMENT/SOCIAL WORK - PATIENT PORTAL LINK FT
You can access the FollowMyHealth Patient Portal offered by [facility] by registering at the following website: [email or web address]. By joining [employer]’s FollowMyHealth portal, you will also be able to view your health information using other applications (apps) compatible with our system.

## 2021-10-12 NOTE — GOALS OF CARE CONVERSATION - ADVANCED CARE PLANNING - WHAT MATTERS MOST
Pt family is aware of wishes and will carry them out. Pt wants to "live as long as possible but only if I am active"

## 2021-10-12 NOTE — GOALS OF CARE CONVERSATION - ADVANCED CARE PLANNING - CONVERSATION DETAILS
Spoke to pt regarding advance care planning. Patient completed healthcare proxy form. Son as first HCP, daughter as alternate. Scanned document into chart and provided pt with original copy. Pt was encouraged to make multiple copies and give to all healthcare providers.

## 2021-10-12 NOTE — PROGRESS NOTE ADULT - ASSESSMENT
86yoF w/ PMHx significant for HLD and hypothyroidism here with lightheadedness improved and mildly unsteady gait PE non-focal other than wide based slightlly magentic gait. MRI as above with ventriculomegaly and atrophy      Impression: MRI findings more consistent with chronic changes than NPH, no clear DESH. Regardless, patient would not want surgical intervention even if there was NPH    supportive care  PT/OT  Can follow up with Neurology, Dr. Jadiel Tapia at [phone] for interim Follow up 
Impression:  Unclear etiology of falling episode at home.  ? orthostasis although blood pressure standing not demonstrated to be low enough to cause symptoms, ? vertigo without spinning sensation but + nausea.  ? NPH with gait disturbance.    Plan: D/C to home.            Patient states she will f/u with neurologist eventually.            To get home BP monitor to assess if truly hypertensive outside of hospital.            Office f/u.
Leg weakness  S/P Fall  Possible UTI  Await urine C&S  MRI  MRA  Carotid dopplers
UTI  Weakness  Falls  MRI, MRA  Physical therapy  Repeat urine C&S  Miralax
Impression:  Overall improved.  Unclear etiology of episodes of dizziness and inability to walk properly and fall.                              ? UTI with symptoms improved.                                Brain MRI interpreted as possible hydrocephalus.    Plan:  Repeat urine culture pending.             Carotid U/S to be done.              Please have neuro comment on MRI findings.       
86yoF w/ PMHx significant for HLD and hypothyroidism here with lightheadedness improved and mildly unsteady gait PE non-focal other than wide based slightlly magentic gait. MRI as above with ventriculomegaly and atrophy      Impression: MRI findings more consistent with chronic changes than NPH, no clear DESH. Regardless, patient would not want intervention even if there was NPH    supportive care  PT/OT  Can follow up with Neurology, Dr. Jadiel Tapia at [phone] for interim Follow up of gait after PT

## 2021-10-26 PROCEDURE — 93880 EXTRACRANIAL BILAT STUDY: CPT

## 2021-10-26 PROCEDURE — 84484 ASSAY OF TROPONIN QUANT: CPT

## 2021-10-26 PROCEDURE — G1004: CPT

## 2021-10-26 PROCEDURE — U0003: CPT

## 2021-10-26 PROCEDURE — 85027 COMPLETE CBC AUTOMATED: CPT

## 2021-10-26 PROCEDURE — 80053 COMPREHEN METABOLIC PANEL: CPT

## 2021-10-26 PROCEDURE — 82962 GLUCOSE BLOOD TEST: CPT

## 2021-10-26 PROCEDURE — 96375 TX/PRO/DX INJ NEW DRUG ADDON: CPT

## 2021-10-26 PROCEDURE — 70450 CT HEAD/BRAIN W/O DYE: CPT | Mod: MG

## 2021-10-26 PROCEDURE — 70551 MRI BRAIN STEM W/O DYE: CPT

## 2021-10-26 PROCEDURE — 86769 SARS-COV-2 COVID-19 ANTIBODY: CPT

## 2021-10-26 PROCEDURE — 97530 THERAPEUTIC ACTIVITIES: CPT

## 2021-10-26 PROCEDURE — 96374 THER/PROPH/DIAG INJ IV PUSH: CPT

## 2021-10-26 PROCEDURE — 97116 GAIT TRAINING THERAPY: CPT

## 2021-10-26 PROCEDURE — 36415 COLL VENOUS BLD VENIPUNCTURE: CPT

## 2021-10-26 PROCEDURE — 87086 URINE CULTURE/COLONY COUNT: CPT

## 2021-10-26 PROCEDURE — 97162 PT EVAL MOD COMPLEX 30 MIN: CPT

## 2021-10-26 PROCEDURE — 71045 X-RAY EXAM CHEST 1 VIEW: CPT

## 2021-10-26 PROCEDURE — 80048 BASIC METABOLIC PNL TOTAL CA: CPT

## 2021-10-26 PROCEDURE — 99285 EMERGENCY DEPT VISIT HI MDM: CPT

## 2021-10-26 PROCEDURE — U0005: CPT

## 2021-10-26 PROCEDURE — 85025 COMPLETE CBC W/AUTO DIFF WBC: CPT

## 2021-10-26 PROCEDURE — 81001 URINALYSIS AUTO W/SCOPE: CPT

## 2022-07-05 PROBLEM — E03.9 HYPOTHYROIDISM, UNSPECIFIED: Chronic | Status: ACTIVE | Noted: 2021-10-07

## 2022-07-05 PROBLEM — E78.5 HYPERLIPIDEMIA, UNSPECIFIED: Chronic | Status: ACTIVE | Noted: 2021-10-07

## 2022-09-06 ENCOUNTER — APPOINTMENT (OUTPATIENT)
Dept: ULTRASOUND IMAGING | Facility: CLINIC | Age: 87
End: 2022-09-06

## 2022-09-06 ENCOUNTER — APPOINTMENT (OUTPATIENT)
Dept: MAMMOGRAPHY | Facility: CLINIC | Age: 87
End: 2022-09-06

## 2022-09-06 PROCEDURE — 76641 ULTRASOUND BREAST COMPLETE: CPT | Mod: 50

## 2022-09-06 PROCEDURE — 77067 SCR MAMMO BI INCL CAD: CPT

## 2022-09-06 PROCEDURE — 77063 BREAST TOMOSYNTHESIS BI: CPT

## 2023-05-12 NOTE — ED ADULT NURSE NOTE - BEFAST SPEECH PHRASE
[Initial Consultation] : an initial consultation for [FreeTextEntry2] : prolonged PT and PTT , needs clearance for bariatric surgery  Yes

## 2023-09-05 ENCOUNTER — APPOINTMENT (OUTPATIENT)
Dept: MAMMOGRAPHY | Facility: CLINIC | Age: 88
End: 2023-09-05
Payer: MEDICARE

## 2023-09-05 PROCEDURE — 77063 BREAST TOMOSYNTHESIS BI: CPT

## 2023-09-05 PROCEDURE — 77067 SCR MAMMO BI INCL CAD: CPT

## 2023-11-04 ENCOUNTER — NON-APPOINTMENT (OUTPATIENT)
Age: 88
End: 2023-11-04

## 2023-11-05 ENCOUNTER — EMERGENCY (EMERGENCY)
Facility: HOSPITAL | Age: 88
LOS: 1 days | Discharge: HOME | End: 2023-11-05
Attending: STUDENT IN AN ORGANIZED HEALTH CARE EDUCATION/TRAINING PROGRAM
Payer: MEDICARE

## 2023-11-05 VITALS
DIASTOLIC BLOOD PRESSURE: 77 MMHG | RESPIRATION RATE: 16 BRPM | SYSTOLIC BLOOD PRESSURE: 197 MMHG | HEART RATE: 76 BPM | TEMPERATURE: 98 F | OXYGEN SATURATION: 98 % | HEIGHT: 61 IN | WEIGHT: 160.06 LBS

## 2023-11-05 VITALS
RESPIRATION RATE: 17 BRPM | DIASTOLIC BLOOD PRESSURE: 76 MMHG | OXYGEN SATURATION: 98 % | TEMPERATURE: 98 F | HEART RATE: 68 BPM | SYSTOLIC BLOOD PRESSURE: 172 MMHG

## 2023-11-05 PROCEDURE — 99284 EMERGENCY DEPT VISIT MOD MDM: CPT | Mod: 25

## 2023-11-05 PROCEDURE — 93970 EXTREMITY STUDY: CPT

## 2023-11-05 PROCEDURE — 93970 EXTREMITY STUDY: CPT | Mod: 26

## 2023-11-05 PROCEDURE — 99284 EMERGENCY DEPT VISIT MOD MDM: CPT

## 2023-11-05 NOTE — ED PROVIDER NOTE - NSFOLLOWUPINSTRUCTIONS_ED_ALL_ED_FT
1) Follow-up with your primary care provider in 1-2 days.    2) Continue to take all medications as prescribed.    3) Rest and stay hydrated.    4) Return to the ER for any new or worsening symptoms, chest pain, shortness of breath, leg pain ro redness, fever, chills, numbness, tingling, weakness, or if any other concerns.      Please read all attached patient information.

## 2023-11-05 NOTE — ED PROVIDER NOTE - PATIENT PORTAL LINK FT
You can access the FollowMyHealth Patient Portal offered by [facility] by registering at the following website: [email or web address]. By joining [employer]’s FollowMyHealth portal, you will also be able to view your health information using other applications (apps) compatible with our system.

## 2023-11-05 NOTE — ED PROVIDER NOTE - ATTENDING APP SHARED VISIT CONTRIBUTION OF CARE
Bennie Heath DO: I have personally performed a face to face medical and diagnostic evaluation of the patient. I have discussed with and reviewed the Resident's and/or ACP's and/or Medical/PA/NP student's note and agree with the History, ROS, Physical Exam and MDM unless otherwise indicated. A brief summary of my personal evaluation and impression can be found below.     86-year-old female PMH of osteoarthritis of the knees presents to the ED complaining of bilateral lower extremity edema right greater than left intermittently for the past 2 weeks, worse with a deep and positioning.  Patient went to the urgent care today to have this evaluated and referred to the ED for further evaluation.  Patient denies chest pain, shortness of breath, hemoptysis, fevers, chills, rash, history of VTE, hormone use, anticoagulant use, recent long car plane rides, recent prolonged immobilization or surgery, numbness, paresthesias, weakness.    CONSTITUTIONAL: NAD  SKIN: as below  HEAD: NCAT  NECK: Supple; non tender. Full ROM.  CARD: RRR  RESP: No respiratory distress  ABD: non-distended  MSK: Full ROM, RLE edema vs L w. mild erythema, no TTP, slight warmth of RLE vs L  PSYCH: Cooperative, appropriate.    r/o dvt, low concern for pe at this time, no indication for labs, will get dvt study and anticipate dc home, consider ac if pt has dvt Bennie Heath DO: I have personally performed a face to face medical and diagnostic evaluation of the patient. I have discussed with and reviewed the Resident's and/or ACP's and/or Medical/PA/NP student's note and agree with the History, ROS, Physical Exam and MDM unless otherwise indicated. A brief summary of my personal evaluation and impression can be found below.     86-year-old female PMH of osteoarthritis of the knees presents to the ED complaining of bilateral lower extremity edema right greater than left intermittently for the past 2 weeks, worse with a deep and positioning.  Patient went to the urgent care today to have this evaluated and referred to the ED for further evaluation.  Patient denies chest pain, shortness of breath, hemoptysis, fevers, chills, rash, history of VTE, hormone use, anticoagulant use, recent long car plane rides, recent prolonged immobilization or surgery, numbness, paresthesias, weakness.    CONSTITUTIONAL: NAD  SKIN: as below  HEAD: NCAT  NECK: Supple; non tender. Full ROM.  CARD: RRR  RESP: No respiratory distress  ABD: non-distended  MSK: Full ROM, RLE edema vs L w. mild erythema, no TTP, slight warmth of RLE vs L  PSYCH: Cooperative, appropriate.    r/o dvt, low concern for pe at this time, no indication for labs, will get dvt study and anticipate dc home, consider ac if pt has dvt. at this time pt has no pitting edema concerning for HF no orthopnea no sob no other symptoms. R knee pain likely 2/2 chornic osteoarthritis, no indication for additional imaging, no suspicion for fracture

## 2023-11-05 NOTE — ED PROVIDER NOTE - OBJECTIVE STATEMENT
86-year-old female PMH of osteoarthritis of the knees presents to the ED complaining of bilateral lower extremity edema right greater than left intermittently for the past 2 weeks, worse with a deep and positioning.  Patient went to the urgent care today to have this evaluated and referred to the ED for further evaluation.  Patient denies chest pain, shortness of breath, hemoptysis, fevers, chills, rash, history of VTE, hormone use, anticoagulant use, recent long car plane rides, recent prolonged immobilization or surgery, numbness, paresthesias, weakness.

## 2023-11-05 NOTE — ED PROVIDER NOTE - PHYSICAL EXAMINATION
GEN: Pt in NAD  PSYCH: Affect appropriate.  EYES: Sclera white w/o injection.   ENT: Head NCAT. Neck supple FROM.  RESP: CTA b/l, no wheezes, rales, or rhonchi.   CARDIAC: RRR, clear distinct S1, S2, no appreciable murmurs.  ABD: Abdomen soft, non-tender.   VASC: 1+ edema fo RLE, trace edema fo LLE.  MSK: No joint erythema or obvious deformity. FROM b/l LE.  NEURO: No focal motor or sensory deficits.  SKIN: No notable rash.

## 2023-11-05 NOTE — ED ADULT TRIAGE NOTE - CHIEF COMPLAINT QUOTE
leg swelling, R worse than L x few weeks, intermittent. visited urgent care and sent to r/o dvt. denies pain

## 2023-11-05 NOTE — ED ADULT NURSE NOTE - NSFALLUNIVINTERV_ED_ALL_ED
Bed/Stretcher in lowest position, wheels locked, appropriate side rails in place/Call bell, personal items and telephone in reach/Instruct patient to call for assistance before getting out of bed/chair/stretcher/Non-slip footwear applied when patient is off stretcher/[place] to call system/Physically safe environment - no spills, clutter or unnecessary equipment/Purposeful proactive rounding/Room/bathroom lighting operational, light cord in reach

## 2023-11-05 NOTE — ED ADULT NURSE NOTE - PRO INTERPRETER NEED 2
Patient is a 87 yo M w/ PMH of CAD s/p CABG, Vertigo, DM, HTN, HLD, Colon polyps presenting with chief complaint of rectal bleeding.    Rectal bleeding  - FOBT positive  - CT reviewed - rectal thickening  - GI consulted by ED  - Monitor CBC, transfuse prn  - Cardiology consult for clearance for possible colonoscopy  - CLD diet for now    CAD s/p CABG, HTN, HLD  - Hold ASA  - Continue home medications  - Hold Atenolol due to bradycardia    Bradycardia  - Telemetry  - Cardiology consulted  - Hold home Atenolol    DM2  - Not on any medications, diet controlled  - Carb consistent diet    DVT ppx; SCDs       Patient is a 85 yo M w/ PMH of CAD s/p CABG, Vertigo, DM, HTN, HLD, Colon polyps presenting with chief complaint of rectal bleeding.    Rectal bleeding  - FOBT positive  - CT reviewed - rectal thickening  - GI consulted by ED  - Monitor CBC, transfuse prn  - Cardiology consult for clearance for possible colonoscopy  - CLD diet for now    CAD s/p CABG, HTN, HLD  - Hold ASA  - Continue home medications  - Hold Atenolol due to bradycardia    Bradycardia  - Telemetry  - Cardiology consulted  - Hold home Atenolol    CKD3  - Avoid nephrotoxic medications  - Monitor electrolytes, correct as needed    DM2  - Not on any medications, diet controlled  - Carb consistent diet    DVT ppx; SCDs       Patient is a 85 yo M w/ PMH of CAD s/p CABG, Vertigo, DM, HTN, HLD, Colon polyps presenting with chief complaint of rectal bleeding.    Rectal bleeding  - FOBT positive  - CT reviewed - rectal thickening  - GI consulted by ED  - Monitor CBC, transfuse prn  - Cardiology consult for clearance for possible colonoscopy  - CLD diet for now    CAD s/p CABG, HTN, HLD  - Hold ASA  - Continue home medications  - Hold Atenolol due to bradycardia    Bradycardia  - Telemetry  - Cardiology consulted  - Hold home Atenolol    CKD3  - Avoid nephrotoxic medications  - Monitor electrolytes, correct as needed    DM2  - Not on any medications, diet controlled  - Sliding scale, FS  - Carb consistent diet    DVT ppx; SCDs       English

## 2023-11-05 NOTE — ED ADULT NURSE NOTE - OBJECTIVE STATEMENT
88y/ 87y/o F coming to the ED c/o B/L LE swelling. Pt states the R LE is greater than the L lower extremity x2 weeks. Patient denies chest pain, shortness of breath, hemoptysis, fevers, chills, rash, history of VTE, hormone use, anticoagulant use, recent long car plane rides, recent prolonged immobilization or surgery, numbness, paresthesias, weakness. On exam, pt is breathing spontaneously, able to speak full sentences w.o difficulty, saturating 98% RA. Abdomen soft, nontender, nondistended. Pt awaiting US result.

## 2024-07-06 ENCOUNTER — NON-APPOINTMENT (OUTPATIENT)
Age: 89
End: 2024-07-06

## 2024-09-09 ENCOUNTER — APPOINTMENT (OUTPATIENT)
Dept: RADIOLOGY | Facility: CLINIC | Age: 89
End: 2024-09-09
Payer: MEDICARE

## 2024-09-09 ENCOUNTER — APPOINTMENT (OUTPATIENT)
Dept: MAMMOGRAPHY | Facility: CLINIC | Age: 89
End: 2024-09-09
Payer: MEDICARE

## 2024-09-09 PROCEDURE — 77067 SCR MAMMO BI INCL CAD: CPT

## 2024-09-09 PROCEDURE — 77063 BREAST TOMOSYNTHESIS BI: CPT

## 2024-09-09 PROCEDURE — 77080 DXA BONE DENSITY AXIAL: CPT

## 2024-09-26 ENCOUNTER — APPOINTMENT (OUTPATIENT)
Dept: UROLOGY | Facility: CLINIC | Age: 89
End: 2024-09-26
Payer: MEDICARE

## 2024-09-26 VITALS
RESPIRATION RATE: 16 BRPM | WEIGHT: 155 LBS | DIASTOLIC BLOOD PRESSURE: 81 MMHG | OXYGEN SATURATION: 96 % | HEART RATE: 90 BPM | BODY MASS INDEX: 29.27 KG/M2 | HEIGHT: 61 IN | SYSTOLIC BLOOD PRESSURE: 147 MMHG | TEMPERATURE: 97.2 F

## 2024-09-26 DIAGNOSIS — Z84.1 FAMILY HISTORY OF DISORDERS OF KIDNEY AND URETER: ICD-10-CM

## 2024-09-26 DIAGNOSIS — R42 DIZZINESS AND GIDDINESS: ICD-10-CM

## 2024-09-26 DIAGNOSIS — Z63.4 DISAPPEARANCE AND DEATH OF FAMILY MEMBER: ICD-10-CM

## 2024-09-26 DIAGNOSIS — R35.1 NOCTURIA: ICD-10-CM

## 2024-09-26 DIAGNOSIS — N39.0 URINARY TRACT INFECTION, SITE NOT SPECIFIED: ICD-10-CM

## 2024-09-26 LAB
BILIRUB UR QL STRIP: ABNORMAL
GLUCOSE UR-MCNC: NEGATIVE
HCG UR QL: 0.2 EU/DL
HGB UR QL STRIP.AUTO: ABNORMAL
KETONES UR-MCNC: ABNORMAL
LEUKOCYTE ESTERASE UR QL STRIP: ABNORMAL
NITRITE UR QL STRIP: NEGATIVE
PH UR STRIP: 5.5
PROT UR STRIP-MCNC: ABNORMAL
SP GR UR STRIP: >=1.03

## 2024-09-26 PROCEDURE — 51798 US URINE CAPACITY MEASURE: CPT

## 2024-09-26 PROCEDURE — G2211 COMPLEX E/M VISIT ADD ON: CPT

## 2024-09-26 PROCEDURE — 99204 OFFICE O/P NEW MOD 45 MIN: CPT | Mod: 25

## 2024-09-26 PROCEDURE — 81003 URINALYSIS AUTO W/O SCOPE: CPT | Mod: QW

## 2024-09-26 SDOH — SOCIAL STABILITY - SOCIAL INSECURITY: DISSAPEARANCE AND DEATH OF FAMILY MEMBER: Z63.4

## 2024-09-26 NOTE — ASSESSMENT
[FreeTextEntry1] :  Ms. HANEY has seen me today for an evaluation of recurrent urinary tract infections (UTIs) (Chronic, progressing).  These have been confirmed on cultures that are not available for me to review.  Based on her exam and clinical picture, we have discussed that in her age group, these infections are likely due to vaginal atrophy, which poses an increased risk of these infections continuing in the future. In addition, we covered the importance of a non-antibiotic based regimen to control UTI symptoms in order to prevent overtreatment and minimize the risk of antibiotic resistance. Moving forward, our plan to prevent future infections includes:         -Vaginal estrogen cream - instructions on use, risks, benefits, and alternatives discussed and provided in a handout        -Hiprex + Vitamin C        -Stool softeners        -Probiotics   In the event of acute UTI symptoms, she has been advised to:        -increase fluid intake        -use OTC Motrin and Azo for pain control        -call my office for a urine culture if symptoms do not improve after 3 days   Regarding nocturia, etiology unclear. No SEBASTIAN stigmata or LYNETTE. Will re-evaluate after UTIs under control  I will treat based on positive urine culture results only in the presence of new symptoms I have asked her to return in 1 month to assess her progress. She understand that treatment will take months to have an effect.

## 2024-09-26 NOTE — HISTORY OF PRESENT ILLNESS
[FreeTextEntry1] : 88F presents for initial evaluation of chronic UTIs   PMH significant for: chronic UTI, vertigo PSH significant for: knee replacement, gallbladder surgery Significant meds: multivitamin  Patient reports having issues with recurring UTIs for 3 years Originally managed by PCP Dr. Gross but was referred to urologist Dr. Gao Has been seeing Dr. Gao for 1.5 years-- was taking methenamine with no improvement  Patient reports having 8-10 UTIs in the past year  There have been 0 culture-proven infections  Offending bacteria include: unknown Acute Symptoms of: dysuria, urinary frequency Symptoms resolve with antibiotics but return after a few days off antibiotics Denies associated hospitalizations.  Associated with sexual activity: no Current UTI prevention regimen: methenamine, cranberry supplements  Has occasional incontinence when standing up, not bothersome-- denies any UUI  NTF: 4-6, wears 3-4 pads daily, sometimes wet

## 2024-09-26 NOTE — PHYSICAL EXAM
[General Appearance - Well Developed] : well developed [General Appearance - Well Nourished] : well nourished [] : no respiratory distress [Abdomen Soft] : soft [Abdomen Tenderness] : non-tender [de-identified] : PVR: 0

## 2024-09-30 LAB — BACTERIA UR CULT: NORMAL

## 2024-10-03 ENCOUNTER — APPOINTMENT (OUTPATIENT)
Dept: UROLOGY | Facility: CLINIC | Age: 89
End: 2024-10-03
Payer: MEDICARE

## 2024-10-03 VITALS
RESPIRATION RATE: 16 BRPM | HEART RATE: 71 BPM | OXYGEN SATURATION: 96 % | SYSTOLIC BLOOD PRESSURE: 161 MMHG | DIASTOLIC BLOOD PRESSURE: 82 MMHG | TEMPERATURE: 97.3 F

## 2024-10-03 DIAGNOSIS — N95.8 OTHER SPECIFIED MENOPAUSAL AND PERIMENOPAUSAL DISORDERS: ICD-10-CM

## 2024-10-03 DIAGNOSIS — N95.2 POSTMENOPAUSAL ATROPHIC VAGINITIS: ICD-10-CM

## 2024-10-03 DIAGNOSIS — N39.41 URGE INCONTINENCE: ICD-10-CM

## 2024-10-03 PROCEDURE — 99214 OFFICE O/P EST MOD 30 MIN: CPT

## 2024-10-03 PROCEDURE — G2211 COMPLEX E/M VISIT ADD ON: CPT

## 2024-10-03 RX ORDER — ESTRADIOL 0.1 MG/G
0.1 CREAM VAGINAL
Qty: 1 | Refills: 6 | Status: ACTIVE | COMMUNITY
Start: 2024-10-03 | End: 1900-01-01

## 2024-10-03 NOTE — HISTORY OF PRESENT ILLNESS
[FreeTextEntry1] : 88F presents for follow up evaluation   h/o rUTIs  Started on vaginal estrogen cream  Started on hiprex and vitamin C and probiotics  UCx 9/26: WNL   10/3/24 Feels well since last visit Has been using estrogen cream with much improvement in vaginal pain/dysuria Reports nighttime frequency has also improved since starting cream Now c/o urge incontinence  DTF: 4-5 NTF: 3-4 PPD: 5-6  Only drinks 1-2 cups of fluid/day Interested in starting medication for OAB

## 2024-10-03 NOTE — ASSESSMENT
[FreeTextEntry1] :  Ms. Durbin presents for follow up  h/o vaginal atrophy and genitourinary syndrome of menopause Excellent symptom control with estrace cream dysuria and irritative voiding symptoms resolved  h/o urge incontinence treatment naive complicated by constipation  Recommendations -continue estrace, script renewed -start Gemtesa 75 mg qd -RTC 1 mo    Patient is being seen today for evaluation and management of a chronic and longitudinal ongoing condition and I am of the primary treating physician.

## 2024-10-07 RX ORDER — VIBEGRON 75 MG/1
75 TABLET, FILM COATED ORAL
Qty: 30 | Refills: 5 | Status: ACTIVE | COMMUNITY
Start: 2024-10-07 | End: 1900-01-01

## 2024-10-31 ENCOUNTER — APPOINTMENT (OUTPATIENT)
Dept: UROLOGY | Facility: CLINIC | Age: 89
End: 2024-10-31

## 2024-12-31 ENCOUNTER — INPATIENT (INPATIENT)
Facility: HOSPITAL | Age: 89
LOS: 8 days | Discharge: HOME HEALTH | DRG: 754 | End: 2025-01-09
Attending: INTERNAL MEDICINE | Admitting: INTERNAL MEDICINE
Payer: MEDICARE

## 2024-12-31 VITALS
TEMPERATURE: 98 F | OXYGEN SATURATION: 95 % | WEIGHT: 149.91 LBS | HEART RATE: 100 BPM | HEIGHT: 60 IN | RESPIRATION RATE: 20 BRPM | SYSTOLIC BLOOD PRESSURE: 137 MMHG | DIASTOLIC BLOOD PRESSURE: 68 MMHG

## 2024-12-31 DIAGNOSIS — Z96.652 PRESENCE OF LEFT ARTIFICIAL KNEE JOINT: Chronic | ICD-10-CM

## 2024-12-31 DIAGNOSIS — E78.5 HYPERLIPIDEMIA, UNSPECIFIED: ICD-10-CM

## 2024-12-31 DIAGNOSIS — K59.00 CONSTIPATION, UNSPECIFIED: ICD-10-CM

## 2024-12-31 DIAGNOSIS — F41.9 ANXIETY DISORDER, UNSPECIFIED: ICD-10-CM

## 2024-12-31 DIAGNOSIS — I26.99 OTHER PULMONARY EMBOLISM WITHOUT ACUTE COR PULMONALE: ICD-10-CM

## 2024-12-31 DIAGNOSIS — Z90.49 ACQUIRED ABSENCE OF OTHER SPECIFIED PARTS OF DIGESTIVE TRACT: Chronic | ICD-10-CM

## 2024-12-31 DIAGNOSIS — I82.409 ACUTE EMBOLISM AND THROMBOSIS OF UNSPECIFIED DEEP VEINS OF UNSPECIFIED LOWER EXTREMITY: ICD-10-CM

## 2024-12-31 DIAGNOSIS — Z79.899 OTHER LONG TERM (CURRENT) DRUG THERAPY: ICD-10-CM

## 2024-12-31 DIAGNOSIS — R18.8 OTHER ASCITES: ICD-10-CM

## 2024-12-31 DIAGNOSIS — E03.9 HYPOTHYROIDISM, UNSPECIFIED: ICD-10-CM

## 2024-12-31 DIAGNOSIS — C78.6 SECONDARY MALIGNANT NEOPLASM OF RETROPERITONEUM AND PERITONEUM: ICD-10-CM

## 2024-12-31 LAB
ADD ON TEST-SPECIMEN IN LAB: SIGNIFICANT CHANGE UP
ALBUMIN SERPL ELPH-MCNC: 2.8 G/DL — LOW (ref 3.3–5)
ALBUMIN SERPL ELPH-MCNC: 3.2 G/DL — LOW (ref 3.3–5)
ALP SERPL-CCNC: 76 U/L — SIGNIFICANT CHANGE UP (ref 40–120)
ALP SERPL-CCNC: 86 U/L — SIGNIFICANT CHANGE UP (ref 40–120)
ALT FLD-CCNC: 15 U/L — SIGNIFICANT CHANGE UP (ref 10–45)
ALT FLD-CCNC: 17 U/L — SIGNIFICANT CHANGE UP (ref 10–45)
ANION GAP SERPL CALC-SCNC: 16 MMOL/L — SIGNIFICANT CHANGE UP (ref 5–17)
ANION GAP SERPL CALC-SCNC: 16 MMOL/L — SIGNIFICANT CHANGE UP (ref 5–17)
APTT BLD: 28.2 SEC — SIGNIFICANT CHANGE UP (ref 24.5–35.6)
AST SERPL-CCNC: 29 U/L — SIGNIFICANT CHANGE UP (ref 10–40)
AST SERPL-CCNC: 31 U/L — SIGNIFICANT CHANGE UP (ref 10–40)
BASOPHILS # BLD AUTO: 0.04 K/UL — SIGNIFICANT CHANGE UP (ref 0–0.2)
BASOPHILS # BLD AUTO: 0.05 K/UL — SIGNIFICANT CHANGE UP (ref 0–0.2)
BASOPHILS NFR BLD AUTO: 0.5 % — SIGNIFICANT CHANGE UP (ref 0–2)
BASOPHILS NFR BLD AUTO: 0.6 % — SIGNIFICANT CHANGE UP (ref 0–2)
BILIRUB SERPL-MCNC: 0.9 MG/DL — SIGNIFICANT CHANGE UP (ref 0.2–1.2)
BILIRUB SERPL-MCNC: 1.2 MG/DL — SIGNIFICANT CHANGE UP (ref 0.2–1.2)
BLD GP AB SCN SERPL QL: NEGATIVE — SIGNIFICANT CHANGE UP
BUN SERPL-MCNC: 16 MG/DL — SIGNIFICANT CHANGE UP (ref 7–23)
BUN SERPL-MCNC: 16 MG/DL — SIGNIFICANT CHANGE UP (ref 7–23)
CALCIUM SERPL-MCNC: 9 MG/DL — SIGNIFICANT CHANGE UP (ref 8.4–10.5)
CALCIUM SERPL-MCNC: 9.8 MG/DL — SIGNIFICANT CHANGE UP (ref 8.4–10.5)
CHLORIDE SERPL-SCNC: 97 MMOL/L — SIGNIFICANT CHANGE UP (ref 96–108)
CHLORIDE SERPL-SCNC: 99 MMOL/L — SIGNIFICANT CHANGE UP (ref 96–108)
CO2 SERPL-SCNC: 18 MMOL/L — LOW (ref 22–31)
CO2 SERPL-SCNC: 20 MMOL/L — LOW (ref 22–31)
CREAT SERPL-MCNC: 0.98 MG/DL — SIGNIFICANT CHANGE UP (ref 0.5–1.3)
CREAT SERPL-MCNC: 1.01 MG/DL — SIGNIFICANT CHANGE UP (ref 0.5–1.3)
EGFR: 54 ML/MIN/1.73M2 — LOW
EGFR: 56 ML/MIN/1.73M2 — LOW
EOSINOPHIL # BLD AUTO: 0.05 K/UL — SIGNIFICANT CHANGE UP (ref 0–0.5)
EOSINOPHIL # BLD AUTO: 0.18 K/UL — SIGNIFICANT CHANGE UP (ref 0–0.5)
EOSINOPHIL NFR BLD AUTO: 0.6 % — SIGNIFICANT CHANGE UP (ref 0–6)
EOSINOPHIL NFR BLD AUTO: 2.3 % — SIGNIFICANT CHANGE UP (ref 0–6)
GAS PNL BLDV: SIGNIFICANT CHANGE UP
GLUCOSE SERPL-MCNC: 100 MG/DL — HIGH (ref 70–99)
GLUCOSE SERPL-MCNC: 75 MG/DL — SIGNIFICANT CHANGE UP (ref 70–99)
HCT VFR BLD CALC: 39.8 % — SIGNIFICANT CHANGE UP (ref 34.5–45)
HCT VFR BLD CALC: 43.6 % — SIGNIFICANT CHANGE UP (ref 34.5–45)
HGB BLD-MCNC: 13.4 G/DL — SIGNIFICANT CHANGE UP (ref 11.5–15.5)
HGB BLD-MCNC: 15.1 G/DL — SIGNIFICANT CHANGE UP (ref 11.5–15.5)
IMM GRANULOCYTES NFR BLD AUTO: 0.4 % — SIGNIFICANT CHANGE UP (ref 0–0.9)
IMM GRANULOCYTES NFR BLD AUTO: 0.6 % — SIGNIFICANT CHANGE UP (ref 0–0.9)
INR BLD: 1.06 RATIO — SIGNIFICANT CHANGE UP (ref 0.85–1.16)
LIDOCAIN IGE QN: 36 U/L — SIGNIFICANT CHANGE UP (ref 7–60)
LYMPHOCYTES # BLD AUTO: 1.94 K/UL — SIGNIFICANT CHANGE UP (ref 1–3.3)
LYMPHOCYTES # BLD AUTO: 2.58 K/UL — SIGNIFICANT CHANGE UP (ref 1–3.3)
LYMPHOCYTES # BLD AUTO: 22.9 % — SIGNIFICANT CHANGE UP (ref 13–44)
LYMPHOCYTES # BLD AUTO: 33.2 % — SIGNIFICANT CHANGE UP (ref 13–44)
MCHC RBC-ENTMCNC: 31.9 PG — SIGNIFICANT CHANGE UP (ref 27–34)
MCHC RBC-ENTMCNC: 32.8 PG — SIGNIFICANT CHANGE UP (ref 27–34)
MCHC RBC-ENTMCNC: 33.7 G/DL — SIGNIFICANT CHANGE UP (ref 32–36)
MCHC RBC-ENTMCNC: 34.6 G/DL — SIGNIFICANT CHANGE UP (ref 32–36)
MCV RBC AUTO: 94.6 FL — SIGNIFICANT CHANGE UP (ref 80–100)
MCV RBC AUTO: 94.8 FL — SIGNIFICANT CHANGE UP (ref 80–100)
MONOCYTES # BLD AUTO: 0.72 K/UL — SIGNIFICANT CHANGE UP (ref 0–0.9)
MONOCYTES # BLD AUTO: 0.91 K/UL — HIGH (ref 0–0.9)
MONOCYTES NFR BLD AUTO: 11.7 % — SIGNIFICANT CHANGE UP (ref 2–14)
MONOCYTES NFR BLD AUTO: 8.5 % — SIGNIFICANT CHANGE UP (ref 2–14)
NEUTROPHILS # BLD AUTO: 4.04 K/UL — SIGNIFICANT CHANGE UP (ref 1.8–7.4)
NEUTROPHILS # BLD AUTO: 5.68 K/UL — SIGNIFICANT CHANGE UP (ref 1.8–7.4)
NEUTROPHILS NFR BLD AUTO: 51.9 % — SIGNIFICANT CHANGE UP (ref 43–77)
NEUTROPHILS NFR BLD AUTO: 66.8 % — SIGNIFICANT CHANGE UP (ref 43–77)
NRBC # BLD: 0 /100 WBCS — SIGNIFICANT CHANGE UP (ref 0–0)
NRBC # BLD: 0 /100 WBCS — SIGNIFICANT CHANGE UP (ref 0–0)
PLATELET # BLD AUTO: 349 K/UL — SIGNIFICANT CHANGE UP (ref 150–400)
PLATELET # BLD AUTO: 405 K/UL — HIGH (ref 150–400)
POTASSIUM SERPL-MCNC: 3.8 MMOL/L — SIGNIFICANT CHANGE UP (ref 3.5–5.3)
POTASSIUM SERPL-MCNC: 4.4 MMOL/L — SIGNIFICANT CHANGE UP (ref 3.5–5.3)
POTASSIUM SERPL-SCNC: 3.8 MMOL/L — SIGNIFICANT CHANGE UP (ref 3.5–5.3)
POTASSIUM SERPL-SCNC: 4.4 MMOL/L — SIGNIFICANT CHANGE UP (ref 3.5–5.3)
PROT SERPL-MCNC: 7.5 G/DL — SIGNIFICANT CHANGE UP (ref 6–8.3)
PROT SERPL-MCNC: 8.8 G/DL — HIGH (ref 6–8.3)
PROTHROM AB SERPL-ACNC: 12.2 SEC — SIGNIFICANT CHANGE UP (ref 9.9–13.4)
RBC # BLD: 4.2 M/UL — SIGNIFICANT CHANGE UP (ref 3.8–5.2)
RBC # BLD: 4.61 M/UL — SIGNIFICANT CHANGE UP (ref 3.8–5.2)
RBC # FLD: 15.8 % — HIGH (ref 10.3–14.5)
RBC # FLD: 15.9 % — HIGH (ref 10.3–14.5)
RH IG SCN BLD-IMP: POSITIVE — SIGNIFICANT CHANGE UP
RH IG SCN BLD-IMP: POSITIVE — SIGNIFICANT CHANGE UP
SODIUM SERPL-SCNC: 133 MMOL/L — LOW (ref 135–145)
SODIUM SERPL-SCNC: 133 MMOL/L — LOW (ref 135–145)
TROPONIN T, HIGH SENSITIVITY RESULT: 25 NG/L — SIGNIFICANT CHANGE UP (ref 0–51)
WBC # BLD: 7.78 K/UL — SIGNIFICANT CHANGE UP (ref 3.8–10.5)
WBC # BLD: 8.49 K/UL — SIGNIFICANT CHANGE UP (ref 3.8–10.5)
WBC # FLD AUTO: 7.78 K/UL — SIGNIFICANT CHANGE UP (ref 3.8–10.5)
WBC # FLD AUTO: 8.49 K/UL — SIGNIFICANT CHANGE UP (ref 3.8–10.5)

## 2024-12-31 PROCEDURE — 74177 CT ABD & PELVIS W/CONTRAST: CPT | Mod: 26,MC

## 2024-12-31 PROCEDURE — 99285 EMERGENCY DEPT VISIT HI MDM: CPT

## 2024-12-31 PROCEDURE — 71046 X-RAY EXAM CHEST 2 VIEWS: CPT | Mod: 26

## 2024-12-31 PROCEDURE — 99223 1ST HOSP IP/OBS HIGH 75: CPT

## 2024-12-31 PROCEDURE — 93970 EXTREMITY STUDY: CPT | Mod: 26

## 2024-12-31 RX ORDER — SENNOSIDES 8.6 MG/1
2 TABLET, FILM COATED ORAL AT BEDTIME
Refills: 0 | Status: DISCONTINUED | OUTPATIENT
Start: 2024-12-31 | End: 2025-01-09

## 2024-12-31 RX ORDER — ATORVASTATIN CALCIUM 40 MG/1
20 TABLET, FILM COATED ORAL AT BEDTIME
Refills: 0 | Status: DISCONTINUED | OUTPATIENT
Start: 2024-12-31 | End: 2025-01-09

## 2024-12-31 RX ORDER — B COMPLEX, C NO.20/FOLIC ACID 1 MG
1 CAPSULE ORAL DAILY
Refills: 0 | Status: DISCONTINUED | OUTPATIENT
Start: 2024-12-31 | End: 2025-01-09

## 2024-12-31 RX ORDER — HEPARIN SODIUM 1000 [USP'U]/ML
6000 INJECTION, SOLUTION INTRAVENOUS; SUBCUTANEOUS EVERY 6 HOURS
Refills: 0 | Status: DISCONTINUED | OUTPATIENT
Start: 2024-12-31 | End: 2025-01-03

## 2024-12-31 RX ORDER — SODIUM CHLORIDE 9 MG/ML
500 INJECTION, SOLUTION INTRAMUSCULAR; INTRAVENOUS; SUBCUTANEOUS ONCE
Refills: 0 | Status: COMPLETED | OUTPATIENT
Start: 2024-12-31 | End: 2024-12-31

## 2024-12-31 RX ORDER — ACETAMINOPHEN 80 MG/.8ML
650 SOLUTION/ DROPS ORAL EVERY 6 HOURS
Refills: 0 | Status: DISCONTINUED | OUTPATIENT
Start: 2024-12-31 | End: 2025-01-09

## 2024-12-31 RX ORDER — LEVOTHYROXINE SODIUM 175 UG/1
75 TABLET ORAL DAILY
Refills: 0 | Status: DISCONTINUED | OUTPATIENT
Start: 2024-12-31 | End: 2025-01-03

## 2024-12-31 RX ORDER — HEPARIN SODIUM 1000 [USP'U]/ML
INJECTION, SOLUTION INTRAVENOUS; SUBCUTANEOUS
Qty: 25000 | Refills: 0 | Status: DISCONTINUED | OUTPATIENT
Start: 2024-12-31 | End: 2025-01-03

## 2024-12-31 RX ORDER — SODIUM PHOSPHATE, DIBASIC, UNSPECIFIED FORM AND SODIUM PHOSPHATE, MONOBASIC, UNSPECIFIED FORM 7; 19 G/133ML; G/133ML
1 ENEMA RECTAL ONCE
Refills: 0 | Status: COMPLETED | OUTPATIENT
Start: 2024-12-31 | End: 2024-12-31

## 2024-12-31 RX ORDER — BISACODYL 5 MG
10 TABLET, DELAYED RELEASE (ENTERIC COATED) ORAL DAILY
Refills: 0 | Status: DISCONTINUED | OUTPATIENT
Start: 2024-12-31 | End: 2025-01-09

## 2024-12-31 RX ORDER — GINKGO BILOBA 40 MG
3 CAPSULE ORAL AT BEDTIME
Refills: 0 | Status: DISCONTINUED | OUTPATIENT
Start: 2024-12-31 | End: 2025-01-09

## 2024-12-31 RX ORDER — ACETAMINOPHEN 80 MG/.8ML
1000 SOLUTION/ DROPS ORAL ONCE
Refills: 0 | Status: COMPLETED | OUTPATIENT
Start: 2024-12-31 | End: 2024-12-31

## 2024-12-31 RX ORDER — HEPARIN SODIUM 1000 [USP'U]/ML
6000 INJECTION, SOLUTION INTRAVENOUS; SUBCUTANEOUS ONCE
Refills: 0 | Status: COMPLETED | OUTPATIENT
Start: 2024-12-31 | End: 2024-12-31

## 2024-12-31 RX ORDER — ALPRAZOLAM 0.25 MG/1
0.5 TABLET ORAL EVERY 12 HOURS
Refills: 0 | Status: DISCONTINUED | OUTPATIENT
Start: 2024-12-31 | End: 2025-01-07

## 2024-12-31 RX ORDER — HEPARIN SODIUM 1000 [USP'U]/ML
3000 INJECTION, SOLUTION INTRAVENOUS; SUBCUTANEOUS EVERY 6 HOURS
Refills: 0 | Status: DISCONTINUED | OUTPATIENT
Start: 2024-12-31 | End: 2025-01-03

## 2024-12-31 RX ORDER — ONDANSETRON 4 MG/1
4 TABLET ORAL ONCE
Refills: 0 | Status: COMPLETED | OUTPATIENT
Start: 2024-12-31 | End: 2024-12-31

## 2024-12-31 RX ORDER — POLYETHYLENE GLYCOL 3350 17 G/DOSE
17 POWDER (GRAM) ORAL DAILY
Refills: 0 | Status: DISCONTINUED | OUTPATIENT
Start: 2024-12-31 | End: 2025-01-09

## 2024-12-31 RX ORDER — ONDANSETRON 4 MG/1
4 TABLET ORAL EVERY 8 HOURS
Refills: 0 | Status: DISCONTINUED | OUTPATIENT
Start: 2024-12-31 | End: 2025-01-09

## 2024-12-31 RX ADMIN — SODIUM CHLORIDE 500 MILLILITER(S): 9 INJECTION, SOLUTION INTRAMUSCULAR; INTRAVENOUS; SUBCUTANEOUS at 14:19

## 2024-12-31 RX ADMIN — Medication 1 APPLICATION(S): at 18:17

## 2024-12-31 RX ADMIN — HEPARIN SODIUM 6000 UNIT(S): 1000 INJECTION, SOLUTION INTRAVENOUS; SUBCUTANEOUS at 19:53

## 2024-12-31 RX ADMIN — HEPARIN SODIUM 1300 UNIT(S)/HR: 1000 INJECTION, SOLUTION INTRAVENOUS; SUBCUTANEOUS at 19:54

## 2024-12-31 RX ADMIN — ALPRAZOLAM 0.5 MILLIGRAM(S): 0.25 TABLET ORAL at 22:46

## 2024-12-31 RX ADMIN — ONDANSETRON 4 MILLIGRAM(S): 4 TABLET ORAL at 14:19

## 2024-12-31 RX ADMIN — ACETAMINOPHEN 400 MILLIGRAM(S): 80 SOLUTION/ DROPS ORAL at 14:19

## 2024-12-31 RX ADMIN — ACETAMINOPHEN 1000 MILLIGRAM(S): 80 SOLUTION/ DROPS ORAL at 18:17

## 2024-12-31 NOTE — H&P ADULT - NSHPLABSRESULTS_GEN_ALL_CORE
Labs personally reviewed:                          13.4   7.78  )-----------( 349      ( 31 Dec 2024 20:06 )             39.8     12-31    133[L]  |  97  |  16  ----------------------------<  75  3.8   |  20[L]  |  0.98    Ca    9.0      31 Dec 2024 20:06    TPro  7.5  /  Alb  2.8[L]  /  TBili  0.9  /  DBili  x   /  AST  29  /  ALT  15  /  AlkPhos  76  12-31        LIVER FUNCTIONS - ( 31 Dec 2024 20:06 )  Alb: 2.8 g/dL / Pro: 7.5 g/dL / ALK PHOS: 76 U/L / ALT: 15 U/L / AST: 29 U/L / GGT: x           PT/INR - ( 31 Dec 2024 19:15 )   PT: 12.2 sec;   INR: 1.06 ratio         PTT - ( 31 Dec 2024 19:15 )  PTT:28.2 sec  Urinalysis Basic - ( 31 Dec 2024 20:06 )    Color: x / Appearance: x / SG: x / pH: x  Gluc: 75 mg/dL / Ketone: x  / Bili: x / Urobili: x   Blood: x / Protein: x / Nitrite: x   Leuk Esterase: x / RBC: x / WBC x   Sq Epi: x / Non Sq Epi: x / Bacteria: x      CAPILLARY BLOOD GLUCOSE          Imaging:  CXR personally reviewed: no focal opacity  CT AP w/ con :   1.  Right lower lobe segmental pulmonary artery embolism. Recommend   further evaluation with CTA chest.  2.  Large ascites. Nodular thickening of the peritoneal lining at the   pelvis.  3.  Ill-defined right adnexal soft tissue density.  4.  Asymmetric prominent the right obturator lymph node measuring 0.8 cm.  5.  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 Recommend further   evaluation with pelvic sonogram.    Vacular  duplex LE : Occlusive thrombus within the proximal right deep femoral vein.      EKG personally reviewed: nsr, no acute st changes

## 2024-12-31 NOTE — ED ADULT NURSE NOTE - OBJECTIVE STATEMENT
PT is an 89 year old A&OX4 female with PMH of HLD, hypothyroidism, and PSH of left knee replacement on 12/4 who presents to the ED from home with c/o constipation. PT states since her surgery she has been constipated. PT endorses having very small BM (last one 2 days ago) however for 2 weeks she endorses not having adequate BM or passing gas. PT also endorsing nausea, abdominal distention, and pain in lower abdomen that she rates 4/10. PT denies chest pain, SOB, N/V, dizziness, and fevers. PT states she has been taking colace without relief. PT is resting comfortably in bed, breathing unlabored on room air, and speaking in complete sentences. Abdomen is soft, non-tender, and mild-to-moderately distended. Skin is warm and dry, no diaphoresis noted. No edema noted to B/L extremities. Strong strength in B/L extremities, sensation intact. IV access established 20G in right AC. PT placed in hospital gown, non-slip socks applied. Safety and comfort maintained. Daughter at the bedside.

## 2024-12-31 NOTE — H&P ADULT - PROBLEM SELECTOR PLAN 3
suspected malignancy , Findings are suggestive of possible right adnexal neoplasm with   peritoneal carcinomatosis on CT   - Pelvic US   - IR consult for diagnostic paracentesis , to be arranged by day team   - Oncology/ GYnonc  consult - pending work up results

## 2024-12-31 NOTE — H&P ADULT - ASSESSMENT
89-osteoarthritis, hypothyroid , HLD , anxiety,   presents for worsening abdominal swelling and pain over the past month. Found to have VTE

## 2024-12-31 NOTE — H&P ADULT - HISTORY OF PRESENT ILLNESS
Patient is an 89-year-old history of osteoarthritis, hypothyroid , HLD , anxiety,   presents for worsening abdominal swelling and pain over the past month.   Patient reports increased abdominal swelling associated with discomfort for several weeks, she reports poor appetite and decreased PO intake during this time. Over the past several days she reports decreased bowel movements. She was advised to come to the hospital about one week ago , but waited to see if there’s any improvement after taking laxatives and stool softeners. She reports no chest pain and no SOB    Of note she had a left knee replacement  about two weeks prior to admission. She reports no vomiting, but feel nauseated at times, no  fevers or chills. She reports bilateral lower extremity edema which started after her knee replacement

## 2024-12-31 NOTE — ED PROVIDER NOTE - OBJECTIVE STATEMENT
89-year-old history of osteoarthritis with a chief complaint of abdominal swelling and pain for one month.  Patient states she got a knee replacement done 2 weeks prior.  And afterwards noticed her abdomen becoming more larger.  Patient does not member the last time she had a bowel movement or passed gas.  Patient also endorsing nausea, decreased appetite.  Denies vomiting, fevers, chills. Patient also noticed this morning had lower extremity swelling on both sides which is new to her.  Also stating she has been bedbound chronically.

## 2024-12-31 NOTE — H&P ADULT - NSHPPHYSICALEXAM_GEN_ALL_CORE
Vital Signs Last 24 Hrs  T(C): 36.8 (31 Dec 2024 21:28), Max: 36.8 (31 Dec 2024 21:28)  T(F): 98.2 (31 Dec 2024 21:28), Max: 98.2 (31 Dec 2024 21:28)  HR: 83 (31 Dec 2024 21:28) (83 - 100)  BP: 130/73 (31 Dec 2024 21:28) (130/73 - 156/94)  BP(mean): 111 (31 Dec 2024 13:25) (111 - 111)  RR: 18 (31 Dec 2024 21:28) (18 - 20)  SpO2: 98% (31 Dec 2024 21:28) (95% - 100%)    Parameters below as of 31 Dec 2024 21:28  Patient On (Oxygen Delivery Method): room air Vital Signs Last 24 Hrs  T(C): 36.8 (31 Dec 2024 21:28), Max: 36.8 (31 Dec 2024 21:28)  T(F): 98.2 (31 Dec 2024 21:28), Max: 98.2 (31 Dec 2024 21:28)  HR: 83 (31 Dec 2024 21:28) (83 - 100)  BP: 130/73 (31 Dec 2024 21:28) (130/73 - 156/94)  BP(mean): 111 (31 Dec 2024 13:25) (111 - 111)  RR: 18 (31 Dec 2024 21:28) (18 - 20)  SpO2: 98% (31 Dec 2024 21:28) (95% - 100%)    Parameters below as of 31 Dec 2024 21:28  Patient On (Oxygen Delivery Method): room air    GENERAL:  No acute distress, well-developed  HEAD:  Atraumatic, Normocephalic  ENT: EOMI, PERRLA, conjunctiva and sclera clear,  moist mucosa no pharyngeal erythema or exudates   NECK: supple , no JVD   CHEST/LUNG: Clear to auscultation bilaterally; No wheeze, equal breath sounds bilaterally   BACK: No spinal tenderness,  No CVA tenderness   HEART: Regular rate and rhythm; No murmurs, rubs, or gallops  ABDOMEN: + distended , + shifting dullness , nontender ; Bowel sounds present  EXTREMITIES:  No clubbing, cyanosis, + 2 pitting  edema b/l   MSK: No joint swelling or effusions, ROM intact   PSYCH: Normal behavior/affect  NEUROLOGY: AAOx3, non-focal, cranial nerves intact  SKIN: Normal color, No rashes or lesions

## 2024-12-31 NOTE — ED PROVIDER NOTE - CARE PLAN
Principal Discharge DX:	Carcinomatosis peritonei  Secondary Diagnosis:	Pulmonary embolism  Secondary Diagnosis:	DVT, lower extremity   1

## 2024-12-31 NOTE — ED PROVIDER NOTE - ATTENDING CONTRIBUTION TO CARE
I performed a history and physical exam of the patient and discussed their management with the resident and /or advanced care provider. I reviewed the resident and /or ACP's note and agree with the documented findings and plan of care. My medical decision making and observations are found above.  lungs clear, abd soft but significantly distended

## 2024-12-31 NOTE — H&P ADULT - PROBLEM SELECTOR PLAN 7
All other review of systems negative, except as noted in HPI -c/w statin   - holding asa while on AC

## 2024-12-31 NOTE — CONSULT NOTE ADULT - SUBJECTIVE AND OBJECTIVE BOX
Patient seen and evaluated in ER   Chief Complaint:       HPI:  89-year-old history of osteoarthritis with a chief complaint of constipation since left total knee replacement early December.  Initially on narcotics post op.  Despite OTC medication and fleets enema no significant BM when last spoken to 8 days ago. .  Advised then to be evaluated in ER if if no BM 1-2 days.  Patient has noted increased abdominal girth past 1-2 weeks.  Denies abdominal pain.   Patient does not member the last time she had a bowel movement or passed gas.  Patient also endorsing decreased appetite and minimal food intake past week.  Denies vomiting, fevers, chills. Patient also noticed this morning had lower extremity swelling on both sides which is new to her.  Since surgery has not been participating very actively in PT and has remained at home in bed or chair.    W/U in ER included CT abdomen and pelvis which reveals ill-defined right adnexal soft tissue density.  Large ascites. Nodular thickening of the peritoneal lining at the pelvis. Asymmetric prominent right obturator lymph node measuring   0.8 cm.  Visualized lower chest revealed coronary artery and aortic calcifications. Right lower lobe segmental pulmonary artery embolism. Small left pleural effusion. Few scattered nodules measuring up to 0.6 cm.    LE venous duplex revealed occlusive thrombus within the proximal right deep femoral vein. The common femoral, mid and distal femoral and popliteal   veins are patent.  No right calf vein thrombosis is detected.  Left normal.      A CT urogram ordered by urologist performed 9/18/24 for evaluation of frequent UTIs revealed mild formed stool burden in the colon,  no bowel wall thickening, no peritoneal free air or fluid, no mesenteric, abdominal or retroperitoneal adenopathy, coarse calcifications in the uterus suggestive of fibroids.       PMH:     Hypothyroidism    HLD (hyperlipidemia)    asthmatic bronchitis    one episode of syncope likely vagal    carotid atherosclerosis    Right rotator cuff tear treated conservatively    Sciatica and lumbar spondylosis treated conservatively 2018    Diverticulosis    Chronic oral lichen planus    Osteoarthritis knees    Recurrent UTIs        PSH:     Cholecystectomy 1991    Tubal ligation    BL cataracts 2012    R total knee replacement 3/2024    L total knee replacement 12/2024      Medications:   heparin   Injectable 6000 Unit(s) IV Push every 6 hours PRN  heparin   Injectable 3000 Unit(s) IV Push every 6 hours PRN  heparin  Infusion.  Unit(s)/Hr IV Continuous <Continuous>    Outpatient medication:    Pravastatin 80 mg qd  Levothyroxine 88 mcg qd  ASA 81 mg qd  Alprazolam 0.25 mg bid  Estradiol vaginal cream     Allergies:  No Known Allergies    FAMILY HISTORY:  Father - tuberculosis  Mother - Alzheimer's  Brother - MI age 38  Brother - DM, HTN and CAD S/P stent  Sister - atrial fibrillation  Daughter - SLE  Son - HTN and lipid abnormality    Social History:  Smoking:  Former    REVIEW OF SYSTEMS:  CONSTITUTIONAL: + weakness - fevers or chills  RESPIRATORY: No cough, shortness of breath  CARDIOVASCULAR: No chest pain or palpitations  GASTROINTESTINAL: See HPI  All other review of systems is negative unless indicated above    Physical Exam:  T(C): 36.8 (12-31-24 @ 21:28), Max: 36.8 (12-31-24 @ 21:28)  HR: 83 (12-31-24 @ 21:28) (83 - 100)  BP: 130/73 (12-31-24 @ 21:28) (130/73 - 156/94)  RR: 18 (12-31-24 @ 21:28) (18 - 20)  SpO2: 98% (12-31-24 @ 21:28) (95% - 100%)  Wt(kg): --    Daily Height in cm: 152.4 (31 Dec 2024 11:20)    Daily     Appearance:  Normal, NAD  Eyes:  EOMI  HEENT: Dry oral [place]a, NC/AT  Neck:  No JVD  Respiratory: Clear to auscultation bilaterally  Cardiovascular: Normal S1 and S2 without murmurs, rubs or gallops  Abdomen:   BS absent.  Softly distended, non-tender without appreciable organomegaly or masses  Extremities: 1/2 + BL leg edema, no calf or thigh tenderness to palpation      Labs:                        13.4   7.78  )-----------( 349      ( 31 Dec 2024 20:06 )             39.8     12-31    133[L]  |  97  |  16  ----------------------------<  75  3.8   |  20[L]  |  0.98    Ca    9.0      31 Dec 2024 20:06    TPro  7.5  /  Alb  2.8[L]  /  TBili  0.9  /  DBili  x   /  AST  29  /  ALT  15  /  AlkPhos  76  12-31    PT/INR - ( 31 Dec 2024 19:15 )   PT: 12.2 sec;   INR: 1.06 ratio         PTT - ( 31 Dec 2024 19:15 )  PTT:28.2 sec

## 2024-12-31 NOTE — ED ADULT NURSE NOTE - NS ED PATIENT SAFETY CONCERN
Peripheral Block    Patient location during procedure: holding area  Start time: 2/22/2024 11:08 AM  Reason for block: at surgeon's request and post-op pain management  Staffing  Performed by: Natalia Toribio MD  Authorized by: Natalia Toribio MD    Preanesthetic Checklist  Completed: patient identified, IV checked, site marked, risks and benefits discussed, surgical consent, monitors and equipment checked, pre-op evaluation and timeout performed  Peripheral Block  Patient position: supine  Prep: ChloraPrep  Patient monitoring: continuous pulse oximetry, frequent blood pressure checks and heart rate  Block type: Adductor Canal  Laterality: right  Injection technique: single-shot  Procedures: ultrasound guided, Ultrasound guidance required for the procedure to increase accuracy and safety of medication placement and decrease risk of complications.  Ultrasound permanent image saved  Needle  Needle type: Stimuplex   Needle gauge: 20 G  Needle length: 4 in  Needle localization: ultrasound guidance  Assessment  Injection assessment: frequent aspiration, injected with ease, negative aspiration, no paresthesia on injection, no symptoms of intraneural/intravenous injection, negative for heart rate change, needle tip visualized at all times and incremental injection  Paresthesia pain: none  Post-procedure:  site cleaned  patient tolerated the procedure well with no immediate complications          
no current epistaxis, no posterior pharynx bleeding
No

## 2024-12-31 NOTE — H&P ADULT - PROBLEM SELECTOR PLAN 8
Patient unable to provide accurate detailed home medication list   - pharm tech emailed to update and verify home medications , day team to reconciled once completed

## 2024-12-31 NOTE — ED ADULT TRIAGE NOTE - CHIEF COMPLAINT QUOTE
12/4 knee replacement and since then has been constipated. Has been having very small BM however for 2w not having BM or passing gas. endorsing nausea, abd distention, and pain in lower abd. Visiting nurse auscultated abdomen and told patient she hears no bowel sounds.

## 2024-12-31 NOTE — CONSULT NOTE ADULT - ASSESSMENT
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                       Acute DVT and pulmonary embolus ? malignancy related and/or related to recent inactivity.                       Constipation.                        Lipid abnormality.                       Anxiety.      Plan: IV heparin.            GYN-Onc consult            Pelvic sonogram.             Bowel regimen.

## 2024-12-31 NOTE — H&P ADULT - NSHPREVIEWOFSYSTEMS_GEN_ALL_CORE
CONSTITUTIONAL:  + weakness, no fevers or chills  EYES/ENT: No visual changes;  No dysphagia  NECK: No pain or stiffness  RESPIRATORY: No cough, wheezing, hemoptysis; No shortness of breath  CARDIOVASCULAR: No chest pain or palpitations; +  lower extremity edema  EXTREMITIES:  + le edema, no  cyanosis, clubbing  GASTROINTESTINAL:  + abdominal  pain. +  nausea, no  vomiting, or hematemesis; No diarrhea + constipation. No melena or hematochezia.  BACK: No back pain  GENITOURINARY: No dysuria, frequency or hematuria  NEUROLOGICAL: No numbness or weakness  MSK: no joint swelling or pain  SKIN: No itching, burning, rashes, or lesions   PSYCH: no agitation  All other review of systems is negative unless indicated above.

## 2024-12-31 NOTE — ED PROVIDER NOTE - PHYSICAL EXAMINATION
General: well appearing, interactive, well nourished, no apparent distress, ncat  HEENT: EOMI, PERRLA, normal mucosa, normal oropharynx, no lesions on the lips or on oral mucosa, normal external ear  Neck: supple, no lymphadenopathy, full range of motion, no nuchal rigidity  CV: RRR, normal S1 and S2 with no murmur, capillary refill less than two seconds  Resp: lungs CTA b/l, good aeration bilaterally, symmetric chest wall   Abd: distended and tenderness on palpation  : no CVA tenderness  MSK: full range of motion, no cyanosis, no edema, no clubbing, no immobility  Neuro: CN II-XII grossly intact, muscle strength 5/5 in all extremities, normal gait  Skin: no rashes, skin intact, bilateral LE swelling R>L.

## 2024-12-31 NOTE — ED PROVIDER NOTE - CLINICAL SUMMARY MEDICAL DECISION MAKING FREE TEXT BOX
Christie: Patient is a 89-year-old woman who presents to the emergency department with not going to the bathroom often for the last month.  Patient also noticed bilateral swelling in her lower extremities over the last few days and has some crackles at the bases.  Will evaluate the abdomen by looking for an impaction as well as getting a CT scan.  Will also evaluate for the possibility of DVTs or fluid overload. Lab studies ordered, independently reviewed and acted on as appropriate. Christie: Patient is a 89-year-old woman who presents to the emergency department with not going to the bathroom often for the last month.  Patient also noticed bilateral swelling in her lower extremities over the last few days and has some crackles at the bases.  Will evaluate the abdomen by looking for an impaction as well as getting a CT scan.  Will also evaluate for the possibility of DVTs or fluid overload. Lab studies ordered, independently reviewed and acted on as appropriate.    89-year-old history of osteoarthritis with a chief complaint of abdominal swelling and pain. Will obtain CT scan to r/o SBO and will attempt manual disimpaction. 305

## 2024-12-31 NOTE — H&P ADULT - PROBLEM SELECTOR PLAN 1
will hold off on dedicated CTA chest since already received contrast load  unlikely to  , troponin within acceptable range , minimal symptoms   - c/w heparin infusion   - TTE

## 2024-12-31 NOTE — H&P ADULT - NSHPPOADEEPVENOUSTHROMB_GEN_A_CORE
Had switched to PO lasix - urine output trending down today - will try one dose zaroxolyn and re-eval tomorrow AM   yes

## 2025-01-01 LAB
APTT BLD: 102.1 SEC — HIGH (ref 24.5–35.6)
APTT BLD: 104.8 SEC — HIGH (ref 24.5–35.6)
APTT BLD: 92.6 SEC — HIGH (ref 24.5–35.6)
APTT BLD: >200 SEC — CRITICAL HIGH (ref 24.5–35.6)
HCT VFR BLD CALC: 42.5 % — SIGNIFICANT CHANGE UP (ref 34.5–45)
HGB BLD-MCNC: 13.8 G/DL — SIGNIFICANT CHANGE UP (ref 11.5–15.5)
MCHC RBC-ENTMCNC: 30.5 PG — SIGNIFICANT CHANGE UP (ref 27–34)
MCHC RBC-ENTMCNC: 32.5 G/DL — SIGNIFICANT CHANGE UP (ref 32–36)
MCV RBC AUTO: 94 FL — SIGNIFICANT CHANGE UP (ref 80–100)
NRBC # BLD: 0 /100 WBCS — SIGNIFICANT CHANGE UP (ref 0–0)
PLATELET # BLD AUTO: 366 K/UL — SIGNIFICANT CHANGE UP (ref 150–400)
RBC # BLD: 4.52 M/UL — SIGNIFICANT CHANGE UP (ref 3.8–5.2)
RBC # FLD: 15.7 % — HIGH (ref 10.3–14.5)
WBC # BLD: 9.54 K/UL — SIGNIFICANT CHANGE UP (ref 3.8–10.5)
WBC # FLD AUTO: 9.54 K/UL — SIGNIFICANT CHANGE UP (ref 3.8–10.5)

## 2025-01-01 RX ORDER — SODIUM CHLORIDE 9 MG/ML
1000 INJECTION, SOLUTION INTRAMUSCULAR; INTRAVENOUS; SUBCUTANEOUS
Refills: 0 | Status: DISCONTINUED | OUTPATIENT
Start: 2025-01-01 | End: 2025-01-09

## 2025-01-01 RX ORDER — ASPIRIN 81 MG
1 TABLET, DELAYED RELEASE (ENTERIC COATED) ORAL
Refills: 0 | DISCHARGE

## 2025-01-01 RX ADMIN — SODIUM CHLORIDE 50 MILLILITER(S): 9 INJECTION, SOLUTION INTRAMUSCULAR; INTRAVENOUS; SUBCUTANEOUS at 12:22

## 2025-01-01 RX ADMIN — ALPRAZOLAM 0.5 MILLIGRAM(S): 0.25 TABLET ORAL at 21:24

## 2025-01-01 RX ADMIN — HEPARIN SODIUM 900 UNIT(S)/HR: 1000 INJECTION, SOLUTION INTRAVENOUS; SUBCUTANEOUS at 17:51

## 2025-01-01 RX ADMIN — SODIUM CHLORIDE 50 MILLILITER(S): 9 INJECTION, SOLUTION INTRAMUSCULAR; INTRAVENOUS; SUBCUTANEOUS at 19:03

## 2025-01-01 RX ADMIN — ATORVASTATIN CALCIUM 20 MILLIGRAM(S): 40 TABLET, FILM COATED ORAL at 21:19

## 2025-01-01 RX ADMIN — HEPARIN SODIUM 1100 UNIT(S)/HR: 1000 INJECTION, SOLUTION INTRAVENOUS; SUBCUTANEOUS at 07:05

## 2025-01-01 RX ADMIN — Medication 1 TABLET(S): at 12:23

## 2025-01-01 RX ADMIN — ALPRAZOLAM 0.5 MILLIGRAM(S): 0.25 TABLET ORAL at 05:02

## 2025-01-01 RX ADMIN — HEPARIN SODIUM 900 UNIT(S)/HR: 1000 INJECTION, SOLUTION INTRAVENOUS; SUBCUTANEOUS at 19:03

## 2025-01-01 RX ADMIN — HEPARIN SODIUM 900 UNIT(S)/HR: 1000 INJECTION, SOLUTION INTRAVENOUS; SUBCUTANEOUS at 20:32

## 2025-01-01 RX ADMIN — HEPARIN SODIUM 1100 UNIT(S)/HR: 1000 INJECTION, SOLUTION INTRAVENOUS; SUBCUTANEOUS at 04:14

## 2025-01-01 RX ADMIN — HEPARIN SODIUM 1100 UNIT(S)/HR: 1000 INJECTION, SOLUTION INTRAVENOUS; SUBCUTANEOUS at 11:26

## 2025-01-01 RX ADMIN — LEVOTHYROXINE SODIUM 75 MICROGRAM(S): 175 TABLET ORAL at 21:19

## 2025-01-01 RX ADMIN — HEPARIN SODIUM 0 UNIT(S)/HR: 1000 INJECTION, SOLUTION INTRAVENOUS; SUBCUTANEOUS at 03:08

## 2025-01-01 NOTE — PROGRESS NOTE ADULT - ASSESSMENT
Ms. Durbin is status post recent knee replacement surgery now with increasing abdominal girth, lower extremity edema, decreased appetite with no recent bowel movement.  Has right lower extremity DVT and right lower lobe pulmonary embolism although no chest pain or shortness of breath.  Presently on IV heparin.  Continue IV anticoagulation for DVT and PE.  CT of abdomen with reported adnexal mass, lymph node and nodular peritoneum consistent with carcinomatosis.  Await evaluation to determine etiology of findings including vaginal sonogram hopefully today.  Has hyponatremia, otherwise labs are unremarkable.  Start maintenance IV fluids (saline).  Possible abdominal tap- primarily diagnostic at this time.    Continue levothyroxine and obtain a TSH.   Ms. Durbin is status post recent knee replacement surgery now with increasing abdominal girth, lower extremity edema, decreased appetite with no recent bowel movement.  Has right lower extremity DVT and right lower lobe pulmonary embolism although no chest pain or shortness of breath.  Presently on IV heparin.  Continue IV anticoagulation for DVT and PE.  CT of abdomen with reported adnexal mass, lymph node and nodular peritoneum consistent with carcinomatosis.  Await evaluation to determine etiology of radiographic findings including vaginal sonogram hopefully today.  Has hyponatremia, otherwise labs are unremarkable.  Start maintenance IV fluids (saline).  Possible abdominal tap- primarily diagnostic at this time.    Continue levothyroxine and obtain a TSH.  Ambulate with assistance.

## 2025-01-01 NOTE — PATIENT PROFILE ADULT - FUNCTIONAL ASSESSMENT - DAILY ACTIVITY 2.
The Service to podiatry order requested on 4/4/2017 has been removed as, we were unable to contact patient via telephone/mail to assist with scheduling.    Please contact patient, if further communication is needed.    Thank you,  Priti Siddiqui       3 = A little assistance

## 2025-01-01 NOTE — PATIENT PROFILE ADULT - FALL HARM RISK - HARM RISK INTERVENTIONS

## 2025-01-02 LAB
ADD ON TEST-SPECIMEN IN LAB: SIGNIFICANT CHANGE UP
ANION GAP SERPL CALC-SCNC: 12 MMOL/L — SIGNIFICANT CHANGE UP (ref 5–17)
APTT BLD: 159.6 SEC — CRITICAL HIGH (ref 24.5–35.6)
APTT BLD: 38.6 SEC — HIGH (ref 24.5–35.6)
APTT BLD: 50.7 SEC — HIGH (ref 24.5–35.6)
BUN SERPL-MCNC: 16 MG/DL — SIGNIFICANT CHANGE UP (ref 7–23)
CALCIUM SERPL-MCNC: 8.8 MG/DL — SIGNIFICANT CHANGE UP (ref 8.4–10.5)
CHLORIDE SERPL-SCNC: 103 MMOL/L — SIGNIFICANT CHANGE UP (ref 96–108)
CO2 SERPL-SCNC: 19 MMOL/L — LOW (ref 22–31)
CREAT SERPL-MCNC: 0.87 MG/DL — SIGNIFICANT CHANGE UP (ref 0.5–1.3)
EGFR: 64 ML/MIN/1.73M2 — SIGNIFICANT CHANGE UP
GLUCOSE SERPL-MCNC: 103 MG/DL — HIGH (ref 70–99)
HCT VFR BLD CALC: 37.9 % — SIGNIFICANT CHANGE UP (ref 34.5–45)
HGB BLD-MCNC: 12.5 G/DL — SIGNIFICANT CHANGE UP (ref 11.5–15.5)
MCHC RBC-ENTMCNC: 30.7 PG — SIGNIFICANT CHANGE UP (ref 27–34)
MCHC RBC-ENTMCNC: 33 G/DL — SIGNIFICANT CHANGE UP (ref 32–36)
MCV RBC AUTO: 93.1 FL — SIGNIFICANT CHANGE UP (ref 80–100)
NRBC # BLD: 0 /100 WBCS — SIGNIFICANT CHANGE UP (ref 0–0)
PLATELET # BLD AUTO: 340 K/UL — SIGNIFICANT CHANGE UP (ref 150–400)
POTASSIUM SERPL-MCNC: 3.4 MMOL/L — LOW (ref 3.5–5.3)
POTASSIUM SERPL-SCNC: 3.4 MMOL/L — LOW (ref 3.5–5.3)
RBC # BLD: 4.07 M/UL — SIGNIFICANT CHANGE UP (ref 3.8–5.2)
RBC # FLD: 16 % — HIGH (ref 10.3–14.5)
SODIUM SERPL-SCNC: 134 MMOL/L — LOW (ref 135–145)
TSH SERPL-MCNC: 17.4 UIU/ML — HIGH (ref 0.27–4.2)
WBC # BLD: 7.3 K/UL — SIGNIFICANT CHANGE UP (ref 3.8–10.5)
WBC # FLD AUTO: 7.3 K/UL — SIGNIFICANT CHANGE UP (ref 3.8–10.5)

## 2025-01-02 PROCEDURE — 71275 CT ANGIOGRAPHY CHEST: CPT | Mod: 26

## 2025-01-02 RX ORDER — POTASSIUM CHLORIDE 600 MG/1
20 TABLET, FILM COATED, EXTENDED RELEASE ORAL ONCE
Refills: 0 | Status: COMPLETED | OUTPATIENT
Start: 2025-01-02 | End: 2025-01-02

## 2025-01-02 RX ADMIN — HEPARIN SODIUM 0 UNIT(S)/HR: 1000 INJECTION, SOLUTION INTRAVENOUS; SUBCUTANEOUS at 14:54

## 2025-01-02 RX ADMIN — ALPRAZOLAM 0.5 MILLIGRAM(S): 0.25 TABLET ORAL at 05:15

## 2025-01-02 RX ADMIN — LEVOTHYROXINE SODIUM 75 MICROGRAM(S): 175 TABLET ORAL at 21:01

## 2025-01-02 RX ADMIN — ALPRAZOLAM 0.5 MILLIGRAM(S): 0.25 TABLET ORAL at 20:06

## 2025-01-02 RX ADMIN — HEPARIN SODIUM 700 UNIT(S)/HR: 1000 INJECTION, SOLUTION INTRAVENOUS; SUBCUTANEOUS at 00:10

## 2025-01-02 RX ADMIN — Medication 17 GRAM(S): at 11:19

## 2025-01-02 RX ADMIN — ATORVASTATIN CALCIUM 20 MILLIGRAM(S): 40 TABLET, FILM COATED ORAL at 21:01

## 2025-01-02 RX ADMIN — HEPARIN SODIUM 800 UNIT(S)/HR: 1000 INJECTION, SOLUTION INTRAVENOUS; SUBCUTANEOUS at 16:06

## 2025-01-02 RX ADMIN — Medication 1 TABLET(S): at 11:19

## 2025-01-02 RX ADMIN — SODIUM CHLORIDE 50 MILLILITER(S): 9 INJECTION, SOLUTION INTRAMUSCULAR; INTRAVENOUS; SUBCUTANEOUS at 20:08

## 2025-01-02 RX ADMIN — POTASSIUM CHLORIDE 20 MILLIEQUIVALENT(S): 600 TABLET, FILM COATED, EXTENDED RELEASE ORAL at 13:24

## 2025-01-02 RX ADMIN — HEPARIN SODIUM 700 UNIT(S)/HR: 1000 INJECTION, SOLUTION INTRAVENOUS; SUBCUTANEOUS at 07:10

## 2025-01-02 RX ADMIN — HEPARIN SODIUM 1000 UNIT(S)/HR: 1000 INJECTION, SOLUTION INTRAVENOUS; SUBCUTANEOUS at 22:43

## 2025-01-02 RX ADMIN — HEPARIN SODIUM 800 UNIT(S)/HR: 1000 INJECTION, SOLUTION INTRAVENOUS; SUBCUTANEOUS at 19:07

## 2025-01-02 RX ADMIN — HEPARIN SODIUM 1000 UNIT(S)/HR: 1000 INJECTION, SOLUTION INTRAVENOUS; SUBCUTANEOUS at 07:47

## 2025-01-02 RX ADMIN — SENNOSIDES 2 TABLET(S): 8.6 TABLET, FILM COATED ORAL at 21:00

## 2025-01-02 RX ADMIN — HEPARIN SODIUM 6000 UNIT(S): 1000 INJECTION, SOLUTION INTRAVENOUS; SUBCUTANEOUS at 07:50

## 2025-01-02 RX ADMIN — HEPARIN SODIUM 3000 UNIT(S): 1000 INJECTION, SOLUTION INTRAVENOUS; SUBCUTANEOUS at 22:46

## 2025-01-02 NOTE — PHYSICAL THERAPY INITIAL EVALUATION ADULT - ACTIVE RANGE OF MOTION EXAMINATION, REHAB EVAL
except R shoulder 0-90d;  L shoulder flexion wfl but requires active assist of PT/bilateral upper extremity Active ROM was WFL (within functional limits)/bilateral  lower extremity Active ROM was WFL (within functional limits)

## 2025-01-02 NOTE — PHYSICAL THERAPY INITIAL EVALUATION ADULT - ADDITIONAL COMMENTS
Pt lives alone in a private house with 1 step to enter. Pt was indepependent PTA  and daughter has been staying with her for 4 weeks PTA. Pt lives alone in a private house with 1 step to enter. Pt was independent PTA  and daughter has been staying with her for 4 weeks PTA.

## 2025-01-02 NOTE — CONSULT NOTE ADULT - SUBJECTIVE AND OBJECTIVE BOX
Interventional Radiology    Evaluate for Procedure: Paracentesis     HPI: 89 year-old history of osteoarthritis, hypothyroid , HLD , anxiety,   presents for worsening abdominal swelling and pain over the past month. Patient reports increased abdominal swelling associated with discomfort for several weeks, she reports poor appetite and decreased PO intake during this time. Over the past several days she reports decreased bowel movements. She was advised to come to the hospital about one week ago , but waited to see if there’s any improvement after taking laxatives and stool softeners. She reports no chest pain and no SOB of note she had a left knee replacement about two weeks prior to admission. She reports no vomiting, but feel nauseated at times, no  fevers or chills. She reports bilateral lower extremity edema which started after her knee replacement. Patient with new onset ascites, IR consulted for dx paracentesis.     Allergies: No Known Allergies    Medications (Abx/Cardiac/Anticoagulation/Blood Products)  heparin   Injectable: 6000 Unit(s) IV Push ( @ 19:53)  heparin  Infusion.: 1000 Unit(s)/Hr IV Continuous ( @ 07:11)    Data:    T(C): 36.7  HR: 95  BP: 121/72  RR: 18  SpO2: 95%    -WBC 7.30 / HgB 12.5 / Hct 37.9 / Plt 340  -Na 134 / Cl 103 / BUN 16 / Glucose 103  -K 3.4 / CO2 19 / Cr 0.87  -ALT -- / Alk Phos -- / T.Bili --  -INR -- / PTT 38.6    Radiology:     Assessment/Plan: 89 year-old history of osteoarthritis, hypothyroid , HLD , anxiety,   presents for worsening abdominal swelling and pain over the past month. Patient reports increased abdominal swelling associated with discomfort for several weeks, she reports poor appetite and decreased PO intake during this time. Over the past several days she reports decreased bowel movements. She was advised to come to the hospital about one week ago , but waited to see if there’s any improvement after taking laxatives and stool softeners. She reports no chest pain and no SOB of note she had a left knee replacement about two weeks prior to admission. She reports no vomiting, but feel nauseated at times, no  fevers or chills. She reports bilateral lower extremity edema which started after her knee replacement. Patient with new onset ascites, IR consulted for dx paracentesis.     -Will plan for dx/ tx Paracentesis tomorrow 1/3  -Please place IR procedure order under KIMBERLY Santos  -STAT am labs  -will coordinate holding hep gtt in am  -Maintain active T&S  -Above d/w primary team      Any questions or concerns regarding above please reach out to IR:   -Available on [employer] teams  -During working hours (7a-5p): call [phone]  -Emergent issues after 5pm: page: [phone]  -Non-emergent consults: Please place a [place] order "IR Consult" with an appropriate callback number  -Scheduling questions: [phone]  -Clinic/Outpatient [phone]

## 2025-01-02 NOTE — PHYSICAL THERAPY INITIAL EVALUATION ADULT - PERTINENT HX OF CURRENT PROBLEM, REHAB EVAL
88 y/o F wih PMH: hypothyroid , HLD , anxiety,   presents for worsening abdominal swelling and pain over the past month. Pt reports increased abdominal swelling associated with discomfort for several weeks, decreased appetite and decreased PO intake. Over the past several days she reports decreased bowel movements. She was advised to come to the hospital about one week ago , but waited to see if there’s any improvement after taking laxatives and stool softeners. She reports no chest pain and no SOB. Of note. she had a left knee replacement  about two weeks prior to admission. She reports no vomiting, but feel nauseated at times, no  fevers or chills. She reports bilateral lower extremity edema which started after her knee replacement.  LE duplex: Occlusive thrombus within the proximal right deep femoral vein. No occlusion LLE. CXR: Minimal left lower lobe patchy opacity may represent atelectasis or pneumonia. CT A/P: 1.  Right lower lobe segmental pulmonary artery embolism. Recommend further evaluation with CTA chest. 2.  Large ascites. Nodular thickening of the peritoneal lining at the pelvis. 3.  Ill-defined right adnexal soft tissue density. 4.  Asymmetric prominent the right obturator lymph node measuring 0.8 cm. 5.  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 Recommend further evaluation with pelvic sonogram. Pt is pending paracentesis with IR on 1/3.

## 2025-01-02 NOTE — CHART NOTE - NSCHARTNOTEFT_GEN_A_CORE
GYN consulted for CT findings of R adnexal mass, large volume ascites, peritoneal carcinomatosis and pelvic LAD concerning for ovarian malignancy. Attempted to see patient three times this afternoon, with PT evaluation ongoing during first two attempts and patient actively being transported to CT scan during third attempt. Will attempt to see patient again when able. Upon chart review, patient undergoing diagnostic and therapeutic paracentesis with IR tomorrow, agree with this workup including biopsy of peritoneal implant, if able.    Ana Prakash PGY3  [facility]

## 2025-01-02 NOTE — PHYSICAL THERAPY INITIAL EVALUATION ADULT - PLANNED THERAPY INTERVENTIONS, PT EVAL
Stairs Goal: Pt will go up/down 5 steps with min assistx 1 in 2 weeks./balance training/bed mobility training/gait training/strengthening/transfer training

## 2025-01-02 NOTE — PROGRESS NOTE ADULT - ASSESSMENT
Impression:  Likely malignant adnexal mass with peritoneal carcinomatosis.                           DVT and PE related to inactivity post knee replacement and possible prothrombotic state.  No dyspnea, hypoxia or pleuritic CP.                          Edema in presence of hypoalbuminemia  no prior history of LV dysfunction.  Unable to located EKG and not yet in [place].                          Constipation no BM in 2 weeks with minimal food intake.                          Hypokalemia possibly nutritional.    Plan": GYN-Onc consult.             Will likely need paracentesis at least diagnostically.               Replete K+.             Patient is now agreeable to receive bowel regimen Tx.               Echocardiogram assess effusion.              CTPA to assess extent of PE noted on lower lung slices of Abd CT and part of metastatic w/u.

## 2025-01-03 ENCOUNTER — RESULT REVIEW (OUTPATIENT)
Age: 89
End: 2025-01-03

## 2025-01-03 LAB
ADD ON TEST-SPECIMEN IN LAB: SIGNIFICANT CHANGE UP
ALBUMIN FLD-MCNC: 2.2 G/DL — SIGNIFICANT CHANGE UP
ALBUMIN SERPL ELPH-MCNC: 2.7 G/DL — LOW (ref 3.3–5)
ALP SERPL-CCNC: 81 U/L — SIGNIFICANT CHANGE UP (ref 40–120)
ALT FLD-CCNC: 16 U/L — SIGNIFICANT CHANGE UP (ref 10–45)
ANION GAP SERPL CALC-SCNC: 12 MMOL/L — SIGNIFICANT CHANGE UP (ref 5–17)
APTT BLD: 27.6 SEC — SIGNIFICANT CHANGE UP (ref 24.5–35.6)
APTT BLD: 73.6 SEC — HIGH (ref 24.5–35.6)
APTT BLD: 99.8 SEC — HIGH (ref 24.5–35.6)
AST SERPL-CCNC: 45 U/L — HIGH (ref 10–40)
B PERT IGG+IGM PNL SER: CLEAR — SIGNIFICANT CHANGE UP
BILIRUB DIRECT SERPL-MCNC: 0.1 MG/DL — SIGNIFICANT CHANGE UP (ref 0–0.3)
BILIRUB INDIRECT FLD-MCNC: 0.3 MG/DL — SIGNIFICANT CHANGE UP (ref 0.2–1)
BILIRUB SERPL-MCNC: 0.4 MG/DL — SIGNIFICANT CHANGE UP (ref 0.2–1.2)
BUN SERPL-MCNC: 14 MG/DL — SIGNIFICANT CHANGE UP (ref 7–23)
CALCIUM SERPL-MCNC: 8.9 MG/DL — SIGNIFICANT CHANGE UP (ref 8.4–10.5)
CHLORIDE SERPL-SCNC: 101 MMOL/L — SIGNIFICANT CHANGE UP (ref 96–108)
CO2 SERPL-SCNC: 18 MMOL/L — LOW (ref 22–31)
COLOR FLD: YELLOW
CREAT SERPL-MCNC: 0.79 MG/DL — SIGNIFICANT CHANGE UP (ref 0.5–1.3)
EGFR: 72 ML/MIN/1.73M2 — SIGNIFICANT CHANGE UP
FLUID INTAKE SUBSTANCE CLASS: SIGNIFICANT CHANGE UP
FOLATE+VIT B12 SERBLD-IMP: 1 % — SIGNIFICANT CHANGE UP
GLUCOSE FLD-MCNC: 100 MG/DL — SIGNIFICANT CHANGE UP
GLUCOSE SERPL-MCNC: 115 MG/DL — HIGH (ref 70–99)
GRAM STN FLD: SIGNIFICANT CHANGE UP
HCT VFR BLD CALC: 37.6 % — SIGNIFICANT CHANGE UP (ref 34.5–45)
HGB BLD-MCNC: 12.5 G/DL — SIGNIFICANT CHANGE UP (ref 11.5–15.5)
LDH SERPL L TO P-CCNC: 529 U/L — SIGNIFICANT CHANGE UP
LYMPHOCYTES # FLD: 54 % — SIGNIFICANT CHANGE UP
MCHC RBC-ENTMCNC: 31.1 PG — SIGNIFICANT CHANGE UP (ref 27–34)
MCHC RBC-ENTMCNC: 33.2 G/DL — SIGNIFICANT CHANGE UP (ref 32–36)
MCV RBC AUTO: 93.5 FL — SIGNIFICANT CHANGE UP (ref 80–100)
MONOS+MACROS # FLD: 35 % — SIGNIFICANT CHANGE UP
NEUTROPHILS-BODY FLUID: 2 % — SIGNIFICANT CHANGE UP
NRBC # BLD: 0 /100 WBCS — SIGNIFICANT CHANGE UP (ref 0–0)
OTHER CELLS FLD MANUAL: 8 % — SIGNIFICANT CHANGE UP
PLATELET # BLD AUTO: 282 K/UL — SIGNIFICANT CHANGE UP (ref 150–400)
POTASSIUM SERPL-MCNC: 3.8 MMOL/L — SIGNIFICANT CHANGE UP (ref 3.5–5.3)
POTASSIUM SERPL-SCNC: 3.8 MMOL/L — SIGNIFICANT CHANGE UP (ref 3.5–5.3)
PROT FLD-MCNC: 5.7 G/DL — SIGNIFICANT CHANGE UP
PROT SERPL-MCNC: 7.4 G/DL — SIGNIFICANT CHANGE UP (ref 6–8.3)
RBC # BLD: 4.02 M/UL — SIGNIFICANT CHANGE UP (ref 3.8–5.2)
RBC # FLD: 15.7 % — HIGH (ref 10.3–14.5)
RCV VOL RI: <2000 CELLS/UL — HIGH
SODIUM SERPL-SCNC: 131 MMOL/L — LOW (ref 135–145)
SPECIMEN SOURCE: SIGNIFICANT CHANGE UP
TOTAL NUCLEATED CELL COUNT, BODY FLUID: 621 CELLS/UL — SIGNIFICANT CHANGE UP
TUBE TYPE: SIGNIFICANT CHANGE UP
WBC # BLD: 8.13 K/UL — SIGNIFICANT CHANGE UP (ref 3.8–10.5)
WBC # FLD AUTO: 8.13 K/UL — SIGNIFICANT CHANGE UP (ref 3.8–10.5)

## 2025-01-03 PROCEDURE — 49083 ABD PARACENTESIS W/IMAGING: CPT

## 2025-01-03 PROCEDURE — 88108 CYTOPATH CONCENTRATE TECH: CPT | Mod: 26,59

## 2025-01-03 PROCEDURE — 88305 TISSUE EXAM BY PATHOLOGIST: CPT | Mod: 26

## 2025-01-03 PROCEDURE — 88112 CYTOPATH CELL ENHANCE TECH: CPT | Mod: 26

## 2025-01-03 PROCEDURE — 88342 IMHCHEM/IMCYTCHM 1ST ANTB: CPT | Mod: 26,59

## 2025-01-03 PROCEDURE — 88341 IMHCHEM/IMCYTCHM EA ADD ANTB: CPT | Mod: 26,59

## 2025-01-03 RX ORDER — HEPARIN SODIUM 1000 [USP'U]/ML
3000 INJECTION, SOLUTION INTRAVENOUS; SUBCUTANEOUS EVERY 6 HOURS
Refills: 0 | Status: DISCONTINUED | OUTPATIENT
Start: 2025-01-03 | End: 2025-01-03

## 2025-01-03 RX ORDER — LEVOTHYROXINE SODIUM 175 UG/1
100 TABLET ORAL DAILY
Refills: 0 | Status: DISCONTINUED | OUTPATIENT
Start: 2025-01-03 | End: 2025-01-09

## 2025-01-03 RX ORDER — HEPARIN SODIUM 1000 [USP'U]/ML
INJECTION, SOLUTION INTRAVENOUS; SUBCUTANEOUS
Qty: 25000 | Refills: 0 | Status: DISCONTINUED | OUTPATIENT
Start: 2025-01-03 | End: 2025-01-03

## 2025-01-03 RX ORDER — HEPARIN SODIUM 1000 [USP'U]/ML
6000 INJECTION, SOLUTION INTRAVENOUS; SUBCUTANEOUS EVERY 6 HOURS
Refills: 0 | Status: DISCONTINUED | OUTPATIENT
Start: 2025-01-03 | End: 2025-01-03

## 2025-01-03 RX ORDER — HEPARIN SODIUM 1000 [USP'U]/ML
5500 INJECTION, SOLUTION INTRAVENOUS; SUBCUTANEOUS EVERY 6 HOURS
Refills: 0 | Status: DISCONTINUED | OUTPATIENT
Start: 2025-01-03 | End: 2025-01-08

## 2025-01-03 RX ORDER — HEPARIN SODIUM 1000 [USP'U]/ML
INJECTION, SOLUTION INTRAVENOUS; SUBCUTANEOUS
Qty: 25000 | Refills: 0 | Status: DISCONTINUED | OUTPATIENT
Start: 2025-01-03 | End: 2025-01-08

## 2025-01-03 RX ORDER — HEPARIN SODIUM 1000 [USP'U]/ML
2500 INJECTION, SOLUTION INTRAVENOUS; SUBCUTANEOUS EVERY 6 HOURS
Refills: 0 | Status: DISCONTINUED | OUTPATIENT
Start: 2025-01-03 | End: 2025-01-08

## 2025-01-03 RX ADMIN — ALPRAZOLAM 0.5 MILLIGRAM(S): 0.25 TABLET ORAL at 05:16

## 2025-01-03 RX ADMIN — LEVOTHYROXINE SODIUM 100 MICROGRAM(S): 175 TABLET ORAL at 21:03

## 2025-01-03 RX ADMIN — HEPARIN SODIUM UNIT(S)/HR: 1000 INJECTION, SOLUTION INTRAVENOUS; SUBCUTANEOUS at 06:29

## 2025-01-03 RX ADMIN — Medication 1 TABLET(S): at 11:53

## 2025-01-03 RX ADMIN — Medication 300 MILLILITER(S): at 14:17

## 2025-01-03 RX ADMIN — SODIUM CHLORIDE 50 MILLILITER(S): 9 INJECTION, SOLUTION INTRAMUSCULAR; INTRAVENOUS; SUBCUTANEOUS at 17:40

## 2025-01-03 RX ADMIN — ALPRAZOLAM 0.5 MILLIGRAM(S): 0.25 TABLET ORAL at 20:56

## 2025-01-03 RX ADMIN — HEPARIN SODIUM 1200 UNIT(S)/HR: 1000 INJECTION, SOLUTION INTRAVENOUS; SUBCUTANEOUS at 19:11

## 2025-01-03 RX ADMIN — HEPARIN SODIUM UNIT(S)/HR: 1000 INJECTION, SOLUTION INTRAVENOUS; SUBCUTANEOUS at 07:14

## 2025-01-03 RX ADMIN — ATORVASTATIN CALCIUM 20 MILLIGRAM(S): 40 TABLET, FILM COATED ORAL at 21:17

## 2025-01-03 RX ADMIN — HEPARIN SODIUM 1200 UNIT(S)/HR: 1000 INJECTION, SOLUTION INTRAVENOUS; SUBCUTANEOUS at 17:41

## 2025-01-03 NOTE — DIETITIAN INITIAL EVALUATION ADULT - ENERGY INTAKE
Patient reports continues with inadequate appetite/PO intake in-house. Breakfast tray observed, consumes <75% of her meals, patient likely meeting <75% of estimated nutritional needs. Amenable to trial Ensure plus 1x daily to optimize PO intake. Pt made aware RD remains available and will follow up for any additional questions/concerns and per protocol.    Fair (50-75%)

## 2025-01-03 NOTE — PROCEDURE NOTE - ADDITIONAL PROCEDURE DETAILS
Specimen sent for [ x  ] analysis [ x  ] culture  [  x ] cytology    Total 1 albumin 25%/50ml administered

## 2025-01-03 NOTE — DIETITIAN INITIAL EVALUATION ADULT - PERTINENT LABORATORY DATA
01-03    131[L]  |  101  |  14  ----------------------------<  115[H]  3.8   |  18[L]  |  0.79    Ca    8.9      03 Jan 2025 06:02

## 2025-01-03 NOTE — DIETITIAN INITIAL EVALUATION ADULT - ORAL INTAKE PTA/DIET HISTORY
Writer met patient at the bedside. Patient reports decreased appetite/PO intake PTA x 1 month in setting of abdominal swelling and pain. Typically consumes 2-3 meals daily. Does not follow any dietary restrictions. No known food allergies. Takes a multivitamin daily at home, denies liquid oral supplement uses. Denies chewing/swallowing difficulties. No nausea/vomiting reported. Endorsees with severe constipation at baseline.

## 2025-01-03 NOTE — DIETITIAN INITIAL EVALUATION ADULT - PERSON TAUGHT/METHOD
Emphasized the importance of adequate kcal and protein intake; recommend to optimize nutritional intake in case of decreased appetite; recommended small frequent meals by ordering nutrient-dense snacks and leaving non-perishable food away from tray for later consumption during the day or between meals; to start with protein, and sips of supplement throughout the day; reviewed foods with protein and menu order procedures in hospital. Pt made aware RD remains available and will follow up for any additional questions/concerns and per protocol./verbal instruction/patient instructed

## 2025-01-03 NOTE — CONSULT NOTE ADULT - NSCONSULTADDITIONALINFOA_GEN_ALL_CORE
Gyn Onc Fellow Addendum    Patient seen at bedside 1/4/25 AM.   Agree with above.   88yo who presented with acute abdominal bloating, constipation, and loss of appetite. On evaluation in the ED, patient was found to have a DVT/PE as well as large volume ascites peritoneal carcinomatosis, pelvic LAD and a complex pelvic mass concerning for malignancy of GYN origin.  -Discussed suspicion for mullerian cancer based on imaging however final diagnosis is pending pathologic evaluation  -S/p paracentesis with removal of 6.8L ascites, follow up cytology  -Recommend IR consult to evaluate for possibility of biopsy of peritoneal thickening /carcinomatosis (not adnexal mass). Biopsy can be done inpatient or outpatient however may be more expeditious inpatient given current anticoagulation   -Tumor markers   -DVT/PE: currently on heparin gtt, consider transition to lovenox or Eliquis pending IR-guided biopsy of solid tissue   -Discussed further management with surgery vs chemotherapy pending diagnosis   -All questions answered to patient's satisfaction  -Appreciate care per primary team     Mary Armas MD PGY7 Gyn Onc Fellow Addendum    Patient seen at bedside 1/4/25 AM.   Agree with above.   88yo who presented with acute abdominal bloating, constipation, and loss of appetite. On evaluation in the ED, patient was found to have a DVT/PE as well as large volume ascites peritoneal carcinomatosis, pelvic LAD and a complex pelvic mass concerning for malignancy of GYN origin.  -Discussed suspicion for mullerian cancer based on imaging however final diagnosis is pending pathologic evaluation  -S/p paracentesis with removal of 6.8L ascites, follow up cytology  -Recommend IR consult to evaluate for possibility of biopsy of peritoneal thickening /carcinomatosis (not adnexal mass). Biopsy can be done inpatient or outpatient however may be more expeditious inpatient given current anticoagulation   -Tumor markers   -DVT/PE: currently on heparin gtt, consider transition to lovenox or Eliquis pending IR-guided biopsy of solid tissue   -Discussed further management with surgery vs chemotherapy pending diagnosis   -All questions answered to patient's satisfaction  -Appreciate care per primary team     Mary Armas MD PGY7    AWM: D/w R and F 1/3/25.

## 2025-01-03 NOTE — DIETITIAN INITIAL EVALUATION ADULT - ADD RECOMMEND
1. Continue current Regular diet as ordered and tolerated  2. Recommend Ensure plus high protein 1x daily (350kcal, 20g proteins) to optimize PO intake  3. RN/Staff to provide assistance during meal time as able  4. Continue to monitor PO intake, weight trend, electrolytes, blood glucose, labs, BMs in-house   5. Monitor for malnutrition

## 2025-01-03 NOTE — PRE PROCEDURE NOTE - PRE PROCEDURE EVALUATION
Interventional Radiology    HPI: 89 year-old history of osteoarthritis, hypothyroid , HLD , anxiety,   presents for worsening abdominal swelling and pain over the past month. Patient reports increased abdominal swelling associated with discomfort for several weeks, she reports poor appetite and decreased PO intake during this time. Over the past several days she reports decreased bowel movements. She was advised to come to the hospital about one week ago , but waited to see if there’s any improvement after taking laxatives and stool softeners. She reports no chest pain and no SOB of note she had a left knee replacement about two weeks prior to admission. She reports no vomiting, but feel nauseated at times, no  fevers or chills. She reports bilateral lower extremity edema which started after her knee replacement. Patient with new onset ascites, Patient now presents to IR for paracentesis.     Allergies: No Known Allergies    Medications (Abx/Cardiac/Anticoagulation/Blood Products)  heparin  Infusion.: 1000 Unit(s)/Hr IV Continuous (01-02 @ 19:08)    Data:    T(C): 36.7  HR: 91  BP: 129/80  RR: 18  SpO2: 95%    Exam  General: No acute distress  Chest: Non labored breathing    -WBC 7.30 / HgB 12.5 / Hct 37.9 / Plt 340  -Na 131 / Cl 101 / BUN 14 / Glucose 115  -K 3.8 / CO2 18 / Cr 0.79  -ALT -- / Alk Phos -- / T.Bili --  -INR1.06    Imaging:   < from: CT Abdomen and Pelvis w/ IV Cont (12.31.24 @ 18:13) >    PROCEDURE:  CT of the Abdomen and Pelvis was performed.  Sagittal and coronal reformats were performed.    FINDINGS:  LOWER CHEST: Coronary artery and aortic calcifications. Right lower lobe   segmental pulmonary artery embolism. Small left pleural effusion. Few   scattered nodules measuring up to 0.6 cm.    LIVER: Scalloping of the hepatic parenchyma.  BILE DUCTS: Normal caliber.  GALLBLADDER: Cholecystectomy.  SPLEEN: Within normal limits.  PANCREAS: Within normal limits.  ADRENALS: Within normal limits.  KIDNEYS/URETERS: No renal stones or hydronephrosis.    BLADDER: Minimally distended.  REPRODUCTIVE ORGANS: Calcified fibroid uterus. Ill-defined right adnexal   soft tissue density.    BOWEL: Small hiatal hernia. No bowel obstruction. Appendix is not   visualized. Scattered colonic diverticulosis.  PERITONEUM/RETROPERITONEUM: Large ascites. Nodular thickening of the   peritoneal lining at the pelvis.  VESSELS: Atherosclerotic changes.  LYMPH NODES: Asymmetric prominent right obturator lymph node measuring   0.8 cm (301/99).  ABDOMINAL WALL: Tiny fat-containing umbilical hernia.  BONES: Degenerative changes.    IMPRESSION:  1. Right lower lobe segmental pulmonary artery embolism. Recommend   further evaluation with CTA chest.  2.  Large ascites. Nodular thickening of the peritoneal lining at the   pelvis.  3.  Ill-defined right adnexal soft tissue density.  4.  Asymmetricprominent the right obturator lymph node measuring 0.8 cm.  5.  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 Recommend further   evaluation with pelvic sonogram.    Findings were discussed with Dr. Nuno 12/31/2024 7:15 PM by Dr. Cabrera   with read back confirmation.    < end of copied text >      Plan: 89y Female presents for paracentesis  -Risks/Benefits/alternatives explained with the patient and/or healthcare proxy and witnessed informed consent obtained.

## 2025-01-03 NOTE — CONSULT NOTE ADULT - ASSESSMENT
Patient is an 89yo with PMH HLD, Hypothyroidism and Anxiety who presented 3 days ago with 2-3 week history of acute abdominal bloating and loss of appetite. On evaluation in the ED, patient was found to have a DVT/PE as well as large volume ascites peritoneal carcinomatosis, pelvic LAD and a complex pelvic mass concerning for malignancy of GYN origin. Patient was admitted to Medicine for therapeutic anticoagulation with heparin gtt and further malignancy workup. On hospital day #4, patient underwent a paracentesis with IR, 3500cc ascites evacuated with peritoneal fluid sent for cytology. Gyn Oncology consulted for further recommendations.    - Please send tumor markers  and CEA with next blood draw  - No additional imaging indicated for malignancy workup at this time, patient is s/p CTAP and CTA chest  - Will require tissue biopsy for diagnostic purposes and to guide potential chemotherapy options. Agree with re-consultation of Interventional Radiology for biopsy of peritoneal implant. In the setting of patient requiring therapeutic anticoagulation for PE, inpatient coordination of biopsy is likely in patient's best interest to expedite workup.  - Pending peritoneal biopsy and results, patient may be a candidate for neoadjuvant chemotherapy for suspected ovarian malignancy  - Gyn Onc team to follow closely while inpatient workup is ongoing    Discussed with Gyn Onc attending, Dr. Dickson and Gyn Onc fellow, Dr. Pawel Prakash PGY3

## 2025-01-03 NOTE — DIETITIAN INITIAL EVALUATION ADULT - PHYSCIAL ASSESSMENT
Nutrition focused physical exam not warranted at this time. No overt sign of muscle/fat depletion noted. Per patient, UBW~160lbs, patient unsure if there is any weight loss recently.    Per Saeed SARKAR weight history:(9/2024)155lbs; (11/2023)160lbs; (10/2021)171lbs;    IBW: 100lbs  IBW%: 160%

## 2025-01-03 NOTE — DIETITIAN INITIAL EVALUATION ADULT - NSFNSPHYEXAMSKINFT_GEN_A_CORE
Location #1	R shoulder  Stage	stage I  Location #2	R Buttocks  Stage	stage I  Location #3	Sacrum  Stage	suspected deep tissue injury

## 2025-01-03 NOTE — DIETITIAN INITIAL EVALUATION ADULT - REASON INDICATOR FOR ASSESSMENT
Patient seen for "Consult for MST score 2 or >, Pressure injury stage 2 or >",Source: Pt, Electronic Medical Record. Chart reviewed, events noted.

## 2025-01-03 NOTE — PROGRESS NOTE ADULT - ASSESSMENT
Impression:  Likely malignant adnexal mass with peritoneal carcinomatosis.                           DVT and PE related to inactivity post knee replacement and possible prothrombotic state.  No dyspnea,                        hypoxia or pleuritic CP.                          Edema in presence of hypoalbuminemia  no prior history of LV dysfunction.                           Constipation no BM in 2 weeks with minimal food intake.                          Hypokalemia possibly nutritional improving with supplementation.                          Mild hyponatremia..    Plan":   Paracentesis and possible peritoneal biopsy to be done.               Further management of tumor pending pathology and as per GYN/Onc..               Follow electrolytes.

## 2025-01-03 NOTE — CONSULT NOTE ADULT - SUBJECTIVE AND OBJECTIVE BOX
ALFREDO HANEY  89y  Female [id number]    HPI: Patient is an 88yo with PMH HLD, Hypothyroidism and Anxiety who presented 3 days ago with 2-3 week history of acute abdominal bloating and loss of appetite. Reports associated alternating constipation and diarrhea for the past several months which she was treating with       Patient is an 89-year-old history of osteoarthritis, hypothyroid , HLD , anxiety,   presents for worsening abdominal swelling and pain over the past month.   Patient reports increased abdominal swelling associated with discomfort for several weeks, she reports poor appetite and decreased PO intake during this time. Over the past several days she reports decreased bowel movements. She was advised to come to the hospital about one week ago , but waited to see if there’s any improvement after taking laxatives and stool softeners. She reports no chest pain and no SOB    Of note she had a left knee replacement  about two weeks prior to admission. She reports no vomiting, but feel nauseated at times, no  fevers or chills. She reports bilateral lower extremity edema which started after her knee replacement   (31 Dec 2024 21:52)      Name of PCP: Dr. Tim Gross  Name of GYN Physician: Dr. Tanner Ohara  OBHx:    GynHx: Denies fibroids, cysts, endometriosis, STI's, Abnormal pap smears   PMH:  PSH:  Meds:  Allx:  Social History:  Denies smoking use, drug use, alcohol use.   +occasional social alcohol use    Vital Signs Last 24 Hrs  T(C): 36.4 (03 Jan 2025 12:48), Max: 36.8 (02 Jan 2025 21:02)  T(F): 97.6 (03 Jan 2025 12:48), Max: 98.3 (02 Jan 2025 21:02)  HR: 89 (03 Jan 2025 12:48) (89 - 93)  BP: 146/71 (03 Jan 2025 12:48) (116/77 - 148/75)  RR: 15 (03 Jan 2025 12:48) (15 - 19)  SpO2: 97% (03 Jan 2025 12:48) (95% - 98%)    Parameters below as of 03 Jan 2025 12:00  Patient On (Oxygen Delivery Method): room air    Physical Exam:   General: sitting comfortably in bed, NAD   HEENT: neck supple, full ROM  CV: extremities well perfused  Lungs: normal respiratory effort on room air  Back: No CVA tenderness  Abd: Soft, non-tender, non-distended  :  No bleeding on pad.  External labia wnl. Bimanual exam with no cervical motion tenderness, uterus wnl, adnexa non palpable b/l.  Cervix closed vs. Cervix dilated  Speculum Exam: No active bleeding from os.  Ext: non-tender b/l, no edema     LABS:                        12.5   7.30  )-----------( 340      ( 02 Jan 2025 07:02 )             37.9     01-03    131[L]  |  101  |  14  ----------------------------<  115[H]  3.8   |  18[L]  |  0.79    Ca    8.9      03 Jan 2025 06:02    TPro  7.4  /  Alb  2.7[L]  /  TBili  0.4  /  DBili  0.1  /  AST  45[H]  /  ALT  16  /  AlkPhos  81  01-03    I&O's Detail    02 Jan 2025 07:01  -  03 Jan 2025 07:00  --------------------------------------------------------  IN:    Oral Fluid: 780 mL  Total IN: 780 mL    OUT:  Total OUT: 0 mL    Total NET: 780 mL      PTT - ( 03 Jan 2025 14:57 )  PTT:27.6 sec  Urinalysis Basic - ( 03 Jan 2025 06:02 )    Color: x / Appearance: x / SG: x / pH: x  Gluc: 115 mg/dL / Ketone: x  / Bili: x / Urobili: x   Blood: x / Protein: x / Nitrite: x   Leuk Esterase: x / RBC: x / WBC x   Sq Epi: x / Non Sq Epi: x / Bacteria: x    RADIOLOGY & ADDITIONAL STUDIES:     ALFREDO HANEY  89y  Female     HPI: Patient is an 88yo with PMH HLD, Hypothyroidism and Anxiety who presented 3 days ago with 2-3 week history of acute abdominal bloating and loss of appetite. Reports associated alternating constipation and diarrhea for the past several months which she was treating with stool softeners and Imodium as needed. Denies associated abdominal pain, nausea, vomiting, weight loss, vaginal bleeding. After prompting, patient does endorse some shortness of breath but denies chest pain and palpitations.     In the ED, patient was found to have a DVT/PE with CTAP revealing large volume ascites peritoneal carcinomatosis, pelvic LAD and a complex pelvic mass concerning for malignancy of GYN origin. She was admitted to Medicine for therapeutic anticoagulation with heparin gtt and further malignancy workup.    Of note, patient was recently in excellent state of health and underwent a L knee replacement on . She [place]story [place], does her own grocery shopping and cares largely for herself. Her daughter had been visiting this past month for postop assistance after her knee replacement.    Name of PCP: Dr. Tim Gross  Name of GYN Physician: Dr. Tanner Ohara  OBHx:  x2  GynHx: Denies fibroids, cysts, endometriosis, STI's, Abnormal pap smears   PMH: Hypothyroid, HLD, Anxiety  PSH: B/l knee replacement (R in 3/2024, L 2024), Open cholecystectomy for gangrenous cholecystitis 30yrs ago  Meds: Levothyroxine, Pravastatin, Xanax 5mg BID  Allx: NKDA  Social History: Denies smoking use, drug use, alcohol use  Family History: Denies first or second degree relatives with h/o uterine, ovarian, cervical or breast cancer. Denies family h/o VTE.    Health maintenance:  - Undergoes mammography annually, most recently 24 BIRADS-2  - DEXA 24: normal bone density  - Colonoscopy ~10 yrs ago    Vital Signs Last 24 Hrs  T(C): 36.4 (2025 12:48), Max: 36.8 (2025 21:02)  T(F): 97.6 (2025 12:48), Max: 98.3 (2025 21:02)  HR: 89 (2025 12:48) (89 - 93)  BP: 146/71 (2025 12:48) (116/77 - 148/75)  RR: 15 (2025 12:48) (15 - 19)  SpO2: 97% (2025 12:48) (95% - 98%)    Parameters below as of 2025 12:00  Patient On (Oxygen Delivery Method): room air    Physical Exam: **Delayed charting due to clinical responsibilities, patient seen at bedside this AM prior to paracentesis**  General: laying comfortably in bed, NAD   HEENT: neck supple, full ROM  CV: extremities well perfused  Lungs: normal respiratory effort on room air  Abd: Tensely distended, dullness to percussion, nontender to deep palpation  : patient declined pelvic exam  Ext: trace LE edema bilaterally    LABS:                        12.5   7.30  )-----------( 340      ( 2025 07:02 )             37.9     -03    131[L]  |  101  |  14  ----------------------------<  115[H]  3.8   |  18[L]  |  0.79    Ca    8.9      2025 06:02    TPro  7.4  /  Alb  2.7[L]  /  TBili  0.4  /  DBili  0.1  /  AST  45[H]  /  ALT  16  /  AlkPhos  81  01-03    I&O's Detail    2025 07:01  -  2025 07:00  --------------------------------------------------------  IN:    Oral Fluid: 780 mL  Total IN: 780 mL    OUT:  Total OUT: 0 mL    Total NET: 780 mL    PTT - ( 2025 14:57 )  PTT:27.6 sec    Urinalysis Basic - ( 2025 06:02 )    Color: x / Appearance: x / SG: x / pH: x  Gluc: 115 mg/dL / Ketone: x  / Bili: x / Urobili: x   Blood: x / Protein: x / Nitrite: x   Leuk Esterase: x / RBC: x / WBC x   Sq Epi: x / Non Sq Epi: x / Bacteria: x    RADIOLOGY & ADDITIONAL STUDIES:    < from: CT Abdomen and Pelvis w/ IV Cont (24 @ 18:13) >  ACC: [id number] EXAM:  CT ABDOMEN AND PELVIS IC   ORDERED BY: GUSTAVO CASTRO     PROCEDURE DATE:  2024      INTERPRETATION:  CLINICAL INFORMATION: Abdominal swelling and discomfort    COMPARISON: CT abdomen and pelvis 2008.    CONTRAST/COMPLICATIONS:  IV Contrast: Omnipaque 350  80 cc administered   20 cc discarded  Oral Contrast: NONE  .    PROCEDURE:  CT of the Abdomen and Pelvis was performed.  Sagittal and coronal reformats were performed.    FINDINGS:  LOWER CHEST: Coronary artery and aortic calcifications. Right lower lobe   segmental pulmonary artery embolism. Small left pleural effusion. Few   scattered nodules measuring up to 0.6 cm.    LIVER: Scalloping of the hepatic parenchyma.  BILE DUCTS: Normal caliber.  GALLBLADDER: Cholecystectomy.  SPLEEN: Within normal limits.  PANCREAS: Within normal limits.  ADRENALS: Within normal limits.  KIDNEYS/URETERS: No renal stones or hydronephrosis.    BLADDER: Minimally distended.  REPRODUCTIVE ORGANS: Calcified fibroid uterus. Ill-defined right adnexal   soft tissue density.    BOWEL: Small hiatal hernia. No bowel obstruction. Appendix is not   visualized. Scattered colonic diverticulosis.  PERITONEUM/RETROPERITONEUM: Large ascites. Nodular thickening of the   peritoneal lining at the pelvis.  VESSELS: Atherosclerotic changes.  LYMPH NODES: Asymmetric prominent right obturator lymph node measuring   0.8 cm (301/99).  ABDOMINAL WALL: Tiny fat-containing umbilical hernia.  BONES: Degenerative changes.    IMPRESSION:  1. Right lower lobe segmental pulmonary artery embolism. Recommend   further evaluation with CTA chest.  2.  Large ascites. Nodular thickening of the peritoneal lining at the   pelvis.  3.  Ill-defined right adnexal soft tissue density.  4.  Asymmetric prominent the right obturator lymph node measuring 0.8 cm.  5.  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 Recommend further   evaluation with pelvic sonogram.    Findings were discussed with Dr. Nuno 2024 7:15 PM by Dr. Barrientos   with read back confirmation.    --- End of Report ---    FRAN BARRIENTOS DO; Resident Radiologist  This document has been electronically signed.  ODALYS LYNETTE MD; Attending Radiologist  This document has been electronically signed. Dec 31 2024  7:25PM    < end of copied text >

## 2025-01-03 NOTE — CONSULT NOTE ADULT - SUBJECTIVE AND OBJECTIVE BOX
Interventional Radiology    Evaluate for Procedure:     HPI: 89y Female with history of osteoarthritis, hypothyroid , HLD , anxiety,   presents for worsening abdominal swelling and pain over the past month. Patient reports increased abdominal swelling associated with discomfort for several weeks, she reports poor appetite and decreased PO intake during this time. Over the past several days she reports decreased bowel movements. She was advised to come to the hospital about one week ago , but waited to see if there’s any improvement after taking laxatives and stool softeners. She reports no chest pain and no SOB of note she had a left knee replacement about two weeks prior to admission. She reports no vomiting, but feel nauseated at times, no  fevers or chills. She reports bilateral lower extremity edema which started after her knee replacement. New onset ascites.     IR consulted for dx paracentesis completed 1/3/25 with diagnostic samples sent for pathology.    IR now consulted for indistinct R adnexal mass biopsy.      Allergies: No Known Allergies    Medications (Abx/Cardiac/Anticoagulation/Blood Products)    heparin  Infusion.: 1000 Unit(s)/Hr IV Continuous (01-02 @ 19:08)    Data:    68  T(C): 36.4  HR: 89  BP: 146/71  RR: 15  SpO2: 97%    -WBC 7.30 / HgB 12.5 / Hct 37.9 / Plt 340  -Na 131 / Cl 101 / BUN 14 / Glucose 115  -K 3.8 / CO2 18 / Cr 0.79  -ALT -- / Alk Phos -- / T.Bili --  -INR -- / PTT 27.6      Radiology: 12/31/24 CTAP    Assessment/Plan: 89 year-old history of osteoarthritis, hypothyroid , HLD , anxiety,   presents for worsening abdominal swelling and pain over the past month. Patient with new onset ascites, IR consulted for dx paracentesis with samples sent to pathology on 1/3/25. IR now consulted for indistinct R adnexal mass biopsy.     -- Await cytopathology from paracentesis prior to planning for R adnexal mass biopsy  -- No current IR intervention planned at this time  -- Case discussed with Dr. Abad.  -- Please reconsult for possible additional biopsy once pathology results return    --  Chris Almeida MD  Vascular and Interventional Radiology   Available on Microsoft Teams    - Non-emergent consults: Place IR consult order in [place]  - Emergent issues (pager): [facility] [phone]; [facility] [phone]; [phone]  - Scheduling questions: [facility] [phone]; [facility] [phone]  - Clinic/outpatient booking: [facility] [phone]; [facility] [phone] Interventional Radiology    Evaluate for Procedure:     HPI: 89y Female with history of osteoarthritis, hypothyroid , HLD , anxiety,   presents for worsening abdominal swelling and pain over the past month. Patient reports increased abdominal swelling associated with discomfort for several weeks, she reports poor appetite and decreased PO intake during this time. Over the past several days she reports decreased bowel movements. She was advised to come to the hospital about one week ago , but waited to see if there’s any improvement after taking laxatives and stool softeners. She reports no chest pain and no SOB of note she had a left knee replacement about two weeks prior to admission. She reports no vomiting, but feel nauseated at times, no  fevers or chills. She reports bilateral lower extremity edema which started after her knee replacement. New onset ascites.     IR consulted for dx paracentesis completed 1/3/25 with diagnostic samples sent for pathology.    IR now consulted for indistinct R adnexal mass biopsy.      Allergies: No Known Allergies    Medications (Abx/Cardiac/Anticoagulation/Blood Products)    heparin  Infusion.: 1000 Unit(s)/Hr IV Continuous (01-02 @ 19:08)    Data:    68  T(C): 36.4  HR: 89  BP: 146/71  RR: 15  SpO2: 97%    -WBC 7.30 / HgB 12.5 / Hct 37.9 / Plt 340  -Na 131 / Cl 101 / BUN 14 / Glucose 115  -K 3.8 / CO2 18 / Cr 0.79  -ALT -- / Alk Phos -- / T.Bili --  -INR -- / PTT 27.6      Radiology: 12/31/24 CTAP    Assessment/Plan: 89 year-old history of osteoarthritis, hypothyroid , HLD , anxiety,   presents for worsening abdominal swelling and pain over the past month. Patient with new onset ascites, IR consulted for dx paracentesis with samples sent to pathology on 1/3/25. IR now consulted for indistinct R adnexal mass biopsy.     -- Await cytopathology from paracentesis prior to planning for R adnexal mass biopsy.  -- Case discussed with Dr. Abad.  -- Please reconsult for possible additional biopsy once pathology results return    --  Chris Almeida MD  Vascular and Interventional Radiology   Available on Microsoft Teams    - Non-emergent consults: Place IR consult order in [place]  - Emergent issues (pager): [facility] [phone]; [facility] [phone]; [phone]  - Scheduling questions: [facility] [phone]; [facility] [phone]  - Clinic/outpatient booking: [facility] [phone]; [facility] [phone]

## 2025-01-03 NOTE — DIETITIAN INITIAL EVALUATION ADULT - NUTRITIONGOAL OUTCOME1
Message printed and placed on MD desk for review    Pt will meet >75% estimated nutrient needs as tolerated.

## 2025-01-03 NOTE — DIETITIAN INITIAL EVALUATION ADULT - ETIOLOGY
decreased ability to consume adequate protein-energy intake in setting of abdominal swelling and pain increased physiological demand for nutrients

## 2025-01-03 NOTE — DIETITIAN INITIAL EVALUATION ADULT - OTHER INFO
- Per chart, Ascites, suspected malignancy, pending workups  - Renal: ordered for Nephro-Kevyn, hypokalemia previously noted, now WNL  - GI: ordered for miralax, senna  - Hypothyroidism ordered for synthroid

## 2025-01-03 NOTE — DIETITIAN INITIAL EVALUATION ADULT - PERTINENT MEDS FT
MEDICATIONS  (STANDING):  ALPRAZolam 0.5 milliGRAM(s) Oral every 12 hours  atorvastatin 20 milliGRAM(s) Oral at bedtime  levothyroxine 75 MICROGram(s) Oral daily  Nephro-salvatore 1 Tablet(s) Oral daily  polyethylene glycol 3350 17 Gram(s) Oral daily  senna 2 Tablet(s) Oral at bedtime  sodium chloride 0.9%. 1000 milliLiter(s) (50 mL/Hr) IV Continuous <Continuous>    MEDICATIONS  (PRN):  acetaminophen     Tablet .. 650 milliGRAM(s) Oral every 6 hours PRN Temp greater or equal to 38C (100.4F), Mild Pain (1 - 3)  bisacodyl Suppository 10 milliGRAM(s) Rectal daily PRN Constipation  melatonin 3 milliGRAM(s) Oral at bedtime PRN Insomnia  ondansetron Injectable 4 milliGRAM(s) IV Push every 8 hours PRN Nausea and/or Vomiting

## 2025-01-04 LAB
ANION GAP SERPL CALC-SCNC: 10 MMOL/L — SIGNIFICANT CHANGE UP (ref 5–17)
APTT BLD: 115.4 SEC — HIGH (ref 24.5–35.6)
APTT BLD: 78.8 SEC — HIGH (ref 24.5–35.6)
APTT BLD: 87.6 SEC — HIGH (ref 24.5–35.6)
BUN SERPL-MCNC: 11 MG/DL — SIGNIFICANT CHANGE UP (ref 7–23)
CALCIUM SERPL-MCNC: 7.7 MG/DL — LOW (ref 8.4–10.5)
CANCER AG125 SERPL-ACNC: 579 U/ML — HIGH
CEA SERPL-MCNC: 2.1 NG/ML — SIGNIFICANT CHANGE UP (ref 0–3.8)
CHLORIDE SERPL-SCNC: 105 MMOL/L — SIGNIFICANT CHANGE UP (ref 96–108)
CO2 SERPL-SCNC: 20 MMOL/L — LOW (ref 22–31)
CREAT SERPL-MCNC: 0.86 MG/DL — SIGNIFICANT CHANGE UP (ref 0.5–1.3)
EGFR: 65 ML/MIN/1.73M2 — SIGNIFICANT CHANGE UP
GLUCOSE SERPL-MCNC: 120 MG/DL — HIGH (ref 70–99)
HCT VFR BLD CALC: 35.4 % — SIGNIFICANT CHANGE UP (ref 34.5–45)
HGB BLD-MCNC: 11.7 G/DL — SIGNIFICANT CHANGE UP (ref 11.5–15.5)
MCHC RBC-ENTMCNC: 30.7 PG — SIGNIFICANT CHANGE UP (ref 27–34)
MCHC RBC-ENTMCNC: 33.1 G/DL — SIGNIFICANT CHANGE UP (ref 32–36)
MCV RBC AUTO: 92.9 FL — SIGNIFICANT CHANGE UP (ref 80–100)
NRBC # BLD: 0 /100 WBCS — SIGNIFICANT CHANGE UP (ref 0–0)
PLATELET # BLD AUTO: 265 K/UL — SIGNIFICANT CHANGE UP (ref 150–400)
POTASSIUM SERPL-MCNC: 3.7 MMOL/L — SIGNIFICANT CHANGE UP (ref 3.5–5.3)
POTASSIUM SERPL-SCNC: 3.7 MMOL/L — SIGNIFICANT CHANGE UP (ref 3.5–5.3)
RBC # BLD: 3.81 M/UL — SIGNIFICANT CHANGE UP (ref 3.8–5.2)
RBC # FLD: 15.9 % — HIGH (ref 10.3–14.5)
SODIUM SERPL-SCNC: 135 MMOL/L — SIGNIFICANT CHANGE UP (ref 135–145)
WBC # BLD: 8.64 K/UL — SIGNIFICANT CHANGE UP (ref 3.8–10.5)
WBC # FLD AUTO: 8.64 K/UL — SIGNIFICANT CHANGE UP (ref 3.8–10.5)

## 2025-01-04 RX ADMIN — ALPRAZOLAM 0.5 MILLIGRAM(S): 0.25 TABLET ORAL at 06:38

## 2025-01-04 RX ADMIN — HEPARIN SODIUM 1100 UNIT(S)/HR: 1000 INJECTION, SOLUTION INTRAVENOUS; SUBCUTANEOUS at 14:06

## 2025-01-04 RX ADMIN — ATORVASTATIN CALCIUM 20 MILLIGRAM(S): 40 TABLET, FILM COATED ORAL at 21:32

## 2025-01-04 RX ADMIN — HEPARIN SODIUM 1200 UNIT(S)/HR: 1000 INJECTION, SOLUTION INTRAVENOUS; SUBCUTANEOUS at 00:06

## 2025-01-04 RX ADMIN — HEPARIN SODIUM 1100 UNIT(S)/HR: 1000 INJECTION, SOLUTION INTRAVENOUS; SUBCUTANEOUS at 21:27

## 2025-01-04 RX ADMIN — HEPARIN SODIUM 1100 UNIT(S)/HR: 1000 INJECTION, SOLUTION INTRAVENOUS; SUBCUTANEOUS at 07:11

## 2025-01-04 RX ADMIN — HEPARIN SODIUM 1100 UNIT(S)/HR: 1000 INJECTION, SOLUTION INTRAVENOUS; SUBCUTANEOUS at 19:43

## 2025-01-04 RX ADMIN — HEPARIN SODIUM 1100 UNIT(S)/HR: 1000 INJECTION, SOLUTION INTRAVENOUS; SUBCUTANEOUS at 14:49

## 2025-01-04 RX ADMIN — ALPRAZOLAM 0.5 MILLIGRAM(S): 0.25 TABLET ORAL at 21:32

## 2025-01-04 RX ADMIN — Medication 17 GRAM(S): at 12:45

## 2025-01-04 RX ADMIN — Medication 1 TABLET(S): at 12:45

## 2025-01-04 RX ADMIN — LEVOTHYROXINE SODIUM 100 MICROGRAM(S): 175 TABLET ORAL at 21:32

## 2025-01-04 NOTE — PROGRESS NOTE ADULT - ASSESSMENT
Ms. Durbin is status post paracentesis, reportedly 6,800 ccs as per IR and reported as 3,500 ccs as per gyn onc.  Abdomen is much less distended.  Further procedures by IR to be postponed pending results of cytology.  Gyn requesting peritoneal biopsy if needed.  No chest discomfort or shortness of breath  Remains on IV Heparin with therapeutic PTT for multiple pulmonary embolism and DVT.  Has hyponatremia and borderline hypokalemia, and today's results are still pending.  Continue maintenance IV fluids (saline).  Daily BMP.  Ambulate with assistance and continue physical therapy as needed.  Input and output noted, reported as one episode of urination without noting of amount on 1/3/25.  No evidence of dehydration with moist mucous membranes and normal skin turgor.  Hemodynamics stable. Continue current treatment (hydration and anticoagulation and await cytology results.

## 2025-01-05 LAB
ANION GAP SERPL CALC-SCNC: 10 MMOL/L — SIGNIFICANT CHANGE UP (ref 5–17)
APTT BLD: 88.6 SEC — HIGH (ref 24.5–35.6)
BUN SERPL-MCNC: 12 MG/DL — SIGNIFICANT CHANGE UP (ref 7–23)
CALCIUM SERPL-MCNC: 8 MG/DL — LOW (ref 8.4–10.5)
CHLORIDE SERPL-SCNC: 105 MMOL/L — SIGNIFICANT CHANGE UP (ref 96–108)
CO2 SERPL-SCNC: 20 MMOL/L — LOW (ref 22–31)
CREAT SERPL-MCNC: 0.74 MG/DL — SIGNIFICANT CHANGE UP (ref 0.5–1.3)
EGFR: 78 ML/MIN/1.73M2 — SIGNIFICANT CHANGE UP
GLUCOSE SERPL-MCNC: 112 MG/DL — HIGH (ref 70–99)
HCT VFR BLD CALC: 37.9 % — SIGNIFICANT CHANGE UP (ref 34.5–45)
HGB BLD-MCNC: 12.2 G/DL — SIGNIFICANT CHANGE UP (ref 11.5–15.5)
MCHC RBC-ENTMCNC: 30.7 PG — SIGNIFICANT CHANGE UP (ref 27–34)
MCHC RBC-ENTMCNC: 32.2 G/DL — SIGNIFICANT CHANGE UP (ref 32–36)
MCV RBC AUTO: 95.2 FL — SIGNIFICANT CHANGE UP (ref 80–100)
NRBC # BLD: 0 /100 WBCS — SIGNIFICANT CHANGE UP (ref 0–0)
PLATELET # BLD AUTO: 255 K/UL — SIGNIFICANT CHANGE UP (ref 150–400)
POTASSIUM SERPL-MCNC: 3.9 MMOL/L — SIGNIFICANT CHANGE UP (ref 3.5–5.3)
POTASSIUM SERPL-SCNC: 3.9 MMOL/L — SIGNIFICANT CHANGE UP (ref 3.5–5.3)
RBC # BLD: 3.98 M/UL — SIGNIFICANT CHANGE UP (ref 3.8–5.2)
RBC # FLD: 16.5 % — HIGH (ref 10.3–14.5)
SODIUM SERPL-SCNC: 135 MMOL/L — SIGNIFICANT CHANGE UP (ref 135–145)
WBC # BLD: 8.08 K/UL — SIGNIFICANT CHANGE UP (ref 3.8–10.5)
WBC # FLD AUTO: 8.08 K/UL — SIGNIFICANT CHANGE UP (ref 3.8–10.5)

## 2025-01-05 RX ORDER — POTASSIUM CHLORIDE 600 MG/1
20 TABLET, FILM COATED, EXTENDED RELEASE ORAL ONCE
Refills: 0 | Status: COMPLETED | OUTPATIENT
Start: 2025-01-05 | End: 2025-01-05

## 2025-01-05 RX ADMIN — SENNOSIDES 2 TABLET(S): 8.6 TABLET, FILM COATED ORAL at 22:01

## 2025-01-05 RX ADMIN — ATORVASTATIN CALCIUM 20 MILLIGRAM(S): 40 TABLET, FILM COATED ORAL at 22:01

## 2025-01-05 RX ADMIN — SODIUM CHLORIDE 50 MILLILITER(S): 9 INJECTION, SOLUTION INTRAMUSCULAR; INTRAVENOUS; SUBCUTANEOUS at 09:18

## 2025-01-05 RX ADMIN — HEPARIN SODIUM 1100 UNIT(S)/HR: 1000 INJECTION, SOLUTION INTRAVENOUS; SUBCUTANEOUS at 07:03

## 2025-01-05 RX ADMIN — HEPARIN SODIUM 1100 UNIT(S)/HR: 1000 INJECTION, SOLUTION INTRAVENOUS; SUBCUTANEOUS at 19:50

## 2025-01-05 RX ADMIN — Medication 1 TABLET(S): at 09:18

## 2025-01-05 RX ADMIN — ALPRAZOLAM 0.5 MILLIGRAM(S): 0.25 TABLET ORAL at 09:18

## 2025-01-05 RX ADMIN — LEVOTHYROXINE SODIUM 100 MICROGRAM(S): 175 TABLET ORAL at 22:01

## 2025-01-05 RX ADMIN — POTASSIUM CHLORIDE 20 MILLIEQUIVALENT(S): 600 TABLET, FILM COATED, EXTENDED RELEASE ORAL at 09:18

## 2025-01-05 RX ADMIN — ALPRAZOLAM 0.5 MILLIGRAM(S): 0.25 TABLET ORAL at 22:01

## 2025-01-05 NOTE — PROGRESS NOTE ADULT - ASSESSMENT
Ms. Durbin is status post paracentesis with cytology pending.  Not clear if Ms Durbin will require a peritoneal biopsy or adnexal mass biopsy.  Therefore continue IV anticoagulation with IV heparin for multiple pulmonary embolisms and DVT as she may require or benefit from another procedure.  Ascites appears to be returning on physical examination.  No chest discomfort or shortness of breath  Remains on IV Heparin with therapeutic PTT.  Electrolytes stable with normalization of serum sodium and acceptable serum potassium.   Will give 20 meq KCL today to avoid hypokalemia.  Continue maintenance IV fluids (saline).  Daily BMP.  Ambulate with assistance and continue physical therapy as needed.  Input and output noted, Hemodynamics stable. Continue current treatment (hydration and anticoagulation and await cytology results.   Ms. Durbin is requesting social service consult to start in motion possible discharge plans.

## 2025-01-06 ENCOUNTER — RESULT REVIEW (OUTPATIENT)
Age: 89
End: 2025-01-06

## 2025-01-06 LAB
ANION GAP SERPL CALC-SCNC: 11 MMOL/L — SIGNIFICANT CHANGE UP (ref 5–17)
ANION GAP SERPL CALC-SCNC: 12 MMOL/L — SIGNIFICANT CHANGE UP (ref 5–17)
APTT BLD: 67 SEC — HIGH (ref 24.5–35.6)
BUN SERPL-MCNC: 11 MG/DL — SIGNIFICANT CHANGE UP (ref 7–23)
BUN SERPL-MCNC: 11 MG/DL — SIGNIFICANT CHANGE UP (ref 7–23)
CALCIUM SERPL-MCNC: 8.2 MG/DL — LOW (ref 8.4–10.5)
CALCIUM SERPL-MCNC: 8.2 MG/DL — LOW (ref 8.4–10.5)
CHLORIDE SERPL-SCNC: 104 MMOL/L — SIGNIFICANT CHANGE UP (ref 96–108)
CHLORIDE SERPL-SCNC: 105 MMOL/L — SIGNIFICANT CHANGE UP (ref 96–108)
CO2 SERPL-SCNC: 20 MMOL/L — LOW (ref 22–31)
CO2 SERPL-SCNC: 20 MMOL/L — LOW (ref 22–31)
CREAT SERPL-MCNC: 0.72 MG/DL — SIGNIFICANT CHANGE UP (ref 0.5–1.3)
CREAT SERPL-MCNC: 0.74 MG/DL — SIGNIFICANT CHANGE UP (ref 0.5–1.3)
EGFR: 78 ML/MIN/1.73M2 — SIGNIFICANT CHANGE UP
EGFR: 80 ML/MIN/1.73M2 — SIGNIFICANT CHANGE UP
GLUCOSE SERPL-MCNC: 86 MG/DL — SIGNIFICANT CHANGE UP (ref 70–99)
GLUCOSE SERPL-MCNC: 86 MG/DL — SIGNIFICANT CHANGE UP (ref 70–99)
HCT VFR BLD CALC: 38.7 % — SIGNIFICANT CHANGE UP (ref 34.5–45)
HGB BLD-MCNC: 12.7 G/DL — SIGNIFICANT CHANGE UP (ref 11.5–15.5)
INR BLD: 1.22 RATIO — HIGH (ref 0.85–1.16)
MCHC RBC-ENTMCNC: 31.1 PG — SIGNIFICANT CHANGE UP (ref 27–34)
MCHC RBC-ENTMCNC: 32.8 G/DL — SIGNIFICANT CHANGE UP (ref 32–36)
MCV RBC AUTO: 94.6 FL — SIGNIFICANT CHANGE UP (ref 80–100)
NON-GYNECOLOGICAL CYTOLOGY STUDY: SIGNIFICANT CHANGE UP
NRBC # BLD: 0 /100 WBCS — SIGNIFICANT CHANGE UP (ref 0–0)
PATHOLOGIST REVIEW: SIGNIFICANT CHANGE UP
PLATELET # BLD AUTO: 280 K/UL — SIGNIFICANT CHANGE UP (ref 150–400)
POTASSIUM SERPL-MCNC: 4.2 MMOL/L — SIGNIFICANT CHANGE UP (ref 3.5–5.3)
POTASSIUM SERPL-MCNC: 4.2 MMOL/L — SIGNIFICANT CHANGE UP (ref 3.5–5.3)
POTASSIUM SERPL-SCNC: 4.2 MMOL/L — SIGNIFICANT CHANGE UP (ref 3.5–5.3)
POTASSIUM SERPL-SCNC: 4.2 MMOL/L — SIGNIFICANT CHANGE UP (ref 3.5–5.3)
PROTHROM AB SERPL-ACNC: 14 SEC — HIGH (ref 9.9–13.4)
RBC # BLD: 4.09 M/UL — SIGNIFICANT CHANGE UP (ref 3.8–5.2)
RBC # FLD: 16.5 % — HIGH (ref 10.3–14.5)
SODIUM SERPL-SCNC: 136 MMOL/L — SIGNIFICANT CHANGE UP (ref 135–145)
SODIUM SERPL-SCNC: 136 MMOL/L — SIGNIFICANT CHANGE UP (ref 135–145)
TOTAL CELLS COUNTED, BODY FLUID: 621 CELLS — SIGNIFICANT CHANGE UP
WBC # BLD: 8.72 K/UL — SIGNIFICANT CHANGE UP (ref 3.8–10.5)
WBC # FLD AUTO: 8.72 K/UL — SIGNIFICANT CHANGE UP (ref 3.8–10.5)
WBC COUNT.: 566 CELLS/UL — HIGH

## 2025-01-06 PROCEDURE — 93306 TTE W/DOPPLER COMPLETE: CPT | Mod: 26

## 2025-01-06 PROCEDURE — 99222 1ST HOSP IP/OBS MODERATE 55: CPT

## 2025-01-06 RX ORDER — APIXABAN 5 MG/1
1 TABLET, FILM COATED ORAL
Qty: 1 | Refills: 0
Start: 2025-01-06 | End: 2025-02-04

## 2025-01-06 RX ORDER — IPRATROPIUM BROMIDE AND ALBUTEROL SULFATE .5; 2.5 MG/3ML; MG/3ML
3 SOLUTION RESPIRATORY (INHALATION) ONCE
Refills: 0 | Status: COMPLETED | OUTPATIENT
Start: 2025-01-06 | End: 2025-01-06

## 2025-01-06 RX ADMIN — HEPARIN SODIUM 1100 UNIT(S)/HR: 1000 INJECTION, SOLUTION INTRAVENOUS; SUBCUTANEOUS at 14:09

## 2025-01-06 RX ADMIN — ALPRAZOLAM 0.5 MILLIGRAM(S): 0.25 TABLET ORAL at 19:40

## 2025-01-06 RX ADMIN — IPRATROPIUM BROMIDE AND ALBUTEROL SULFATE 3 MILLILITER(S): .5; 2.5 SOLUTION RESPIRATORY (INHALATION) at 22:38

## 2025-01-06 RX ADMIN — HEPARIN SODIUM 1100 UNIT(S)/HR: 1000 INJECTION, SOLUTION INTRAVENOUS; SUBCUTANEOUS at 09:10

## 2025-01-06 RX ADMIN — SODIUM CHLORIDE 50 MILLILITER(S): 9 INJECTION, SOLUTION INTRAMUSCULAR; INTRAVENOUS; SUBCUTANEOUS at 14:35

## 2025-01-06 RX ADMIN — HEPARIN SODIUM 1100 UNIT(S)/HR: 1000 INJECTION, SOLUTION INTRAVENOUS; SUBCUTANEOUS at 07:01

## 2025-01-06 RX ADMIN — HEPARIN SODIUM 1100 UNIT(S)/HR: 1000 INJECTION, SOLUTION INTRAVENOUS; SUBCUTANEOUS at 19:05

## 2025-01-06 RX ADMIN — LEVOTHYROXINE SODIUM 100 MICROGRAM(S): 175 TABLET ORAL at 21:04

## 2025-01-06 RX ADMIN — Medication 1 TABLET(S): at 14:35

## 2025-01-06 RX ADMIN — ATORVASTATIN CALCIUM 20 MILLIGRAM(S): 40 TABLET, FILM COATED ORAL at 21:04

## 2025-01-06 RX ADMIN — ALPRAZOLAM 0.5 MILLIGRAM(S): 0.25 TABLET ORAL at 09:42

## 2025-01-06 NOTE — PROGRESS NOTE ADULT - ASSESSMENT
Impression:  Likely ovarian cancer with CA-125 579 and normal CEA.                          DVT and PE likely related to inactivity pot op TKR and malignancy.                          Some degree of ascites reaccumulation.                           Hyponatremia resolved.    Plan:  Await cytology.              GYN-Onc on board.             OOB to chair at least 2x/day.             Continue IV heparin.

## 2025-01-06 NOTE — CHART NOTE - NSCHARTNOTEFT_GEN_A_CORE
See prior consult note. Seen ~89y 1/6/25. Card provided. Disc clinical findings, markers, and pending cytology from paracentesis. Disc concern for gyn malignancy. Disc mgmt options, incl my rec for NACT with consideration of IDS. We disc my rationale for this, the assessment for and nature of IDS, as well as the overall outlook n answer to her queries. We disc my advice for a Med Onc consultation to begin to facilitate her plan of care. All Q/A. Will follow.

## 2025-01-06 NOTE — CONSULT NOTE ADULT - SUBJECTIVE AND OBJECTIVE BOX
HPI (from admission)     Patient is an 89-year-old history of osteoarthritis, hypothyroid , HLD , anxiety,   presents for worsening abdominal swelling and pain over the past month.   Patient reports increased abdominal swelling associated with discomfort for several weeks, she reports poor appetite and decreased PO intake during this time. Over the past several days she reports decreased bowel movements. She was advised to come to the hospital about one week ago , but waited to see if there’s any improvement after taking laxatives and stool softeners. She reports no chest pain and no SOB    Of note she had a left knee replacement  about two weeks prior to admission. She reports no vomiting, but feel nauseated at times, no  fevers or chills. She reports bilateral lower extremity edema which started after her knee replacement      PAST MEDICAL & SURGICAL HISTORY:    Hypothyroidism      HLD (hyperlipidemia)      H/O total knee replacement, left      S/P cholecystectomy          Allergies    No Known Allergies    Intolerances        MEDICATIONS  (STANDING):  ALPRAZolam 0.5 milliGRAM(s) Oral every 12 hours  atorvastatin 20 milliGRAM(s) Oral at bedtime  heparin  Infusion.  Unit(s)/Hr (12 mL/Hr) IV Continuous <Continuous>  levothyroxine 100 MICROGram(s) Oral daily  Nephro-salvatore 1 Tablet(s) Oral daily  polyethylene glycol 3350 17 Gram(s) Oral daily  senna 2 Tablet(s) Oral at bedtime  sodium chloride 0.9%. 1000 milliLiter(s) (50 mL/Hr) IV Continuous <Continuous>    MEDICATIONS  (PRN):  acetaminophen     Tablet .. 650 milliGRAM(s) Oral every 6 hours PRN Temp greater or equal to 38C (100.4F), Mild Pain (1 - 3)  bisacodyl Suppository 10 milliGRAM(s) Rectal daily PRN Constipation  heparin   Injectable 5500 Unit(s) IV Push every 6 hours PRN For aPTT less than 40  heparin   Injectable 2500 Unit(s) IV Push every 6 hours PRN For aPTT between 40 - 57  melatonin 3 milliGRAM(s) Oral at bedtime PRN Insomnia  ondansetron Injectable 4 milliGRAM(s) IV Push every 8 hours PRN Nausea and/or Vomiting      FAMILY HISTORY:      SOCIAL HISTORY: No EtOH, no tobacco    REVIEW OF SYSTEMS:    CONSTITUTIONAL: +weakness, fevers or chills  EYES/ENT: No visual changes;  No vertigo or throat pain   NECK: No pain or stiffness  RESPIRATORY: No cough, wheezing, hemoptysis; No shortness of breath  CARDIOVASCULAR: No chest pain or palpitations  GASTROINTESTINAL: + abdominal or epigastric pain. No nausea, vomiting, or hematemesis  GENITOURINARY: No dysuria, frequency or hematuria  NEUROLOGICAL: No numbness or weakness  SKIN: No itching, burning, rashes, or lesions   All other review of systems is negative unless indicated above.        T(F): 98.2 (01-06-25 @ 04:29), Max: 98.7 (01-05-25 @ 20:41)  HR: 85 (01-06-25 @ 04:29)  BP: 119/69 (01-06-25 @ 04:29)  RR: 17 (01-06-25 @ 04:29)  SpO2: 95% (01-06-25 @ 04:29)  Wt(kg): --    GENERAL: NAD, well-developed  HEAD:  Atraumatic, Normocephalic  EYES: EOMI, PERRLA, conjunctiva and sclera clear  NECK: Supple, No JVD  CHEST/LUNG: Clear to auscultation bilaterally  HEART: Regular rate and rhythm  ABDOMEN: Soft, Nontender, Distended   EXTREMITIES:  2+ Peripheral Pulses, No clubbing, cyanosis, or edema  NEUROLOGY: non-focal, A&Ox3   SKIN: No rashes or lesions                          12.7   8.72  )-----------( 280      ( 06 Jan 2025 06:47 )             38.7       136  |  105  |  11  ----------------------------<  86  4.2   |  20[L]  |  0.74    Ca    8.2[L]      06 Jan 2025 06:47          PT/INR - ( 06 Jan 2025 06:47 )   PT: 14.0 sec;   INR: 1.22 ratio      PTT - ( 06 Jan 2025 06:47 )  PTT:67.0 sec    Ascites Fl  01-03 @ 13:01   No growth  --    polymorphonuclear leukocytes seen  No organisms seen  by cytocentrifuge HPI (from admission)     Patient is an 89-year-old history of osteoarthritis, hypothyroid , HLD , anxiety,   presents for worsening abdominal swelling and pain over the past month.   Patient reports increased abdominal swelling associated with discomfort for several weeks, she reports poor appetite and decreased PO intake during this time. Over the past several days she reports decreased bowel movements. She was advised to come to the hospital about one week ago , but waited to see if there’s any improvement after taking laxatives and stool softeners. She reports no chest pain and no SOB    Of note she had a left knee replacement  about two weeks prior to admission. She reports no vomiting, but feel nauseated at times, no  fevers or chills. She reports bilateral lower extremity edema which started after her knee replacement.      PAST MEDICAL & SURGICAL HISTORY:    Hypothyroidism      HLD (hyperlipidemia)      H/O total knee replacement, left      S/P cholecystectomy          Allergies    No Known Allergies    Intolerances        MEDICATIONS  (STANDING):  ALPRAZolam 0.5 milliGRAM(s) Oral every 12 hours  atorvastatin 20 milliGRAM(s) Oral at bedtime  heparin  Infusion.  Unit(s)/Hr (12 mL/Hr) IV Continuous <Continuous>  levothyroxine 100 MICROGram(s) Oral daily  Nephro-salvatore 1 Tablet(s) Oral daily  polyethylene glycol 3350 17 Gram(s) Oral daily  senna 2 Tablet(s) Oral at bedtime  sodium chloride 0.9%. 1000 milliLiter(s) (50 mL/Hr) IV Continuous <Continuous>    MEDICATIONS  (PRN):  acetaminophen     Tablet .. 650 milliGRAM(s) Oral every 6 hours PRN Temp greater or equal to 38C (100.4F), Mild Pain (1 - 3)  bisacodyl Suppository 10 milliGRAM(s) Rectal daily PRN Constipation  heparin   Injectable 5500 Unit(s) IV Push every 6 hours PRN For aPTT less than 40  heparin   Injectable 2500 Unit(s) IV Push every 6 hours PRN For aPTT between 40 - 57  melatonin 3 milliGRAM(s) Oral at bedtime PRN Insomnia  ondansetron Injectable 4 milliGRAM(s) IV Push every 8 hours PRN Nausea and/or Vomiting      FAMILY HISTORY:      SOCIAL HISTORY: No EtOH, no tobacco    REVIEW OF SYSTEMS:    CONSTITUTIONAL: +weakness, fevers or chills  EYES/ENT: No visual changes;  No vertigo or throat pain   NECK: No pain or stiffness  RESPIRATORY: No cough, wheezing, hemoptysis; No shortness of breath  CARDIOVASCULAR: No chest pain or palpitations  GASTROINTESTINAL: + abdominal or epigastric pain. No nausea, vomiting, or hematemesis  GENITOURINARY: No dysuria, frequency or hematuria  NEUROLOGICAL: No numbness or weakness  SKIN: No itching, burning, rashes, or lesions   All other review of systems is negative unless indicated above.        T(F): 98.2 (01-06-25 @ 04:29), Max: 98.7 (01-05-25 @ 20:41)  HR: 85 (01-06-25 @ 04:29)  BP: 119/69 (01-06-25 @ 04:29)  RR: 17 (01-06-25 @ 04:29)  SpO2: 95% (01-06-25 @ 04:29)  Wt(kg): --    GENERAL: NAD, well-developed  HEAD:  Atraumatic, Normocephalic  EYES: EOMI, PERRLA, conjunctiva and sclera clear  NECK: Supple, No JVD  CHEST/LUNG: Clear to auscultation bilaterally  HEART: Regular rate and rhythm  ABDOMEN: Soft, Nontender, Distended   EXTREMITIES:  2+ Peripheral Pulses, No clubbing, cyanosis, or edema  NEUROLOGY: non-focal, A&Ox3   SKIN: No rashes or lesions                          12.7   8.72  )-----------( 280      ( 06 Jan 2025 06:47 )             38.7       136  |  105  |  11  ----------------------------<  86  4.2   |  20[L]  |  0.74    Ca    8.2[L]      06 Jan 2025 06:47          PT/INR - ( 06 Jan 2025 06:47 )   PT: 14.0 sec;   INR: 1.22 ratio      PTT - ( 06 Jan 2025 06:47 )  PTT:67.0 sec    Ascites Fl  01-03 @ 13:01   No growth  --    polymorphonuclear leukocytes seen  No organisms seen  by cytocentrifuge

## 2025-01-06 NOTE — CONSULT NOTE ADULT - ASSESSMENT
89yo with PMH HLD, Hypothyroidism and Anxiety who presented 3 days ago with 2-3 week history of acute abdominal bloating and loss of appetite. On evaluation in the ED, patient was found to have a DVT/PE as well as large volume ascites, peritoneal carcinomatosis, pelvic LAD and a complex pelvic mass concerning for malignancy of GYN origin. Patient was admitted to Medicine for therapeutic anticoagulation with heparin gtt and further malignancy workup. Patient underwent a paracentesis with IR on 1/3/25, 3500cc ascites evacuated with peritoneal fluid sent for cytology.     Patient with good performance status and underwent a L knee replacement on 12/4. She lives alone in a multi-story [place], does her own grocery shopping and cares largely for herself. Her daughter had been visiting this past month for postop assistance after her knee replacement.  No history of smoking. She denies first or second degree relatives with h/o uterine, ovarian, cervical or breast cancer.      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    Recommendations:   - Evaluated by GYN oncology. Given extent of disease, patient would require neoadjuvant chemotherapy and not candidate for upfront debulking   - She has a good performance status and woul be a candidate for neoadjuvant chemotherapy. We need tissue diagnosis to confirm. Please follow up cytopathology from ascites which has been expedited.   - If the pathology for the ascites fluid is non-diagnostic, recommend IR biopsy of peritoneal implant. Given that she is requiring therapeutic AC, we agree that it would be in her best interest to coordinate this inpatient to expedite work up.    David Rodarte MD MS  Hematology/Oncology Fellow PGY-4  [employer] [facility] [employer] at [facility] | [facility] | [facility]  Available on Teams  88yo with PMH HLD, Hypothyroidism and Anxiety who presented 3 days ago with 2-3 week history of acute abdominal bloating and loss of appetite. On evaluation in the ED, patient was found to have a DVT/PE as well as large volume ascites, peritoneal carcinomatosis, pelvic LAD and a complex pelvic mass concerning for malignancy of GYN origin. Patient was admitted to Medicine for therapeutic anticoagulation with heparin gtt and further malignancy workup. Patient underwent a paracentesis with IR on 1/3/25, 3500cc ascites evacuated with peritoneal fluid sent for cytology.     Patient with good performance status and underwent a L knee replacement on 12/4. She lives alone in a multi-story [place], does her own grocery shopping and cares largely for herself. Her daughter had been visiting this past month for postop assistance after her knee replacement.  No history of smoking. She denies first or second degree relatives with h/o uterine, ovarian, cervical or breast cancer.      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    Recommendations:   - Evaluated by GYN oncology. Given extent of disease, patient would require neoadjuvant chemotherapy and not candidate for upfront debulking   - She has a good performance status and would be a candidate for neoadjuvant chemotherapy. Reviewed path from ascites fluid POSITIVE FOR MALIGNANT CELLS, ADENOCARCINOMA, pending IHC   - She is agreeable to treatment for her cancer. We will arrange for outpatient follow up at [facility]   - Recommend transitioning to DOAC at discharge depending on insurance coverage   - Patient requesting to speak to  regarding qualifications for home health aides as she lives alone and needs assistance with transportation for appointments     David Rodarte MD MS  Hematology/Oncology Fellow PGY-4  [employer] at [facility] | [facility] | [facility]  Available on Teams

## 2025-01-06 NOTE — CONSULT NOTE ADULT - ATTENDING COMMENTS
Pt was living by herself in her house and was independent. She has daughter who lives in [place] and son who lives 10 minutes away. She had been having abdominal pain and bloating symptoms but was attributing to constipation until she came to [facility] and had CT imaging and work up. Found to have PE and DVT currently on AC with heparin gtt. She had poor nutrition and had paracentesis. We encouraged po intake with protein/ supplement to help with nutrition. On exam, normal respiratory effort, abdomen mild distension, ecchymoses BUE from iv, BLE edema 2+ stocking distribution. CT showing peritoneal carcinomatosis with ascites and MRI showing ovarian mass. Pathology from paracentesis showing adenocarcinoma: IHC pending. We reviewed GYN oncology evaluation: recommending NAC first in setting of carcinomatosis and PE/DVT. We reviewed with pt options. She is amenable to trying chemotherapy: she will try to s/w case management about getting help at home. Will set up outpatient evaluation at [facility] upon discharge.

## 2025-01-07 LAB
ANION GAP SERPL CALC-SCNC: 9 MMOL/L — SIGNIFICANT CHANGE UP (ref 5–17)
APTT BLD: 70.3 SEC — HIGH (ref 24.5–35.6)
BUN SERPL-MCNC: 11 MG/DL — SIGNIFICANT CHANGE UP (ref 7–23)
CALCIUM SERPL-MCNC: 7.8 MG/DL — LOW (ref 8.4–10.5)
CHLORIDE SERPL-SCNC: 105 MMOL/L — SIGNIFICANT CHANGE UP (ref 96–108)
CO2 SERPL-SCNC: 20 MMOL/L — LOW (ref 22–31)
CREAT SERPL-MCNC: 0.78 MG/DL — SIGNIFICANT CHANGE UP (ref 0.5–1.3)
EGFR: 73 ML/MIN/1.73M2 — SIGNIFICANT CHANGE UP
GLUCOSE SERPL-MCNC: 108 MG/DL — HIGH (ref 70–99)
HCT VFR BLD CALC: 36.5 % — SIGNIFICANT CHANGE UP (ref 34.5–45)
HGB BLD-MCNC: 12.3 G/DL — SIGNIFICANT CHANGE UP (ref 11.5–15.5)
MCHC RBC-ENTMCNC: 31.6 PG — SIGNIFICANT CHANGE UP (ref 27–34)
MCHC RBC-ENTMCNC: 33.7 G/DL — SIGNIFICANT CHANGE UP (ref 32–36)
MCV RBC AUTO: 93.8 FL — SIGNIFICANT CHANGE UP (ref 80–100)
NRBC # BLD: 0 /100 WBCS — SIGNIFICANT CHANGE UP (ref 0–0)
PLATELET # BLD AUTO: 258 K/UL — SIGNIFICANT CHANGE UP (ref 150–400)
POTASSIUM SERPL-MCNC: 3.9 MMOL/L — SIGNIFICANT CHANGE UP (ref 3.5–5.3)
POTASSIUM SERPL-SCNC: 3.9 MMOL/L — SIGNIFICANT CHANGE UP (ref 3.5–5.3)
RBC # BLD: 3.89 M/UL — SIGNIFICANT CHANGE UP (ref 3.8–5.2)
RBC # FLD: 16.4 % — HIGH (ref 10.3–14.5)
SODIUM SERPL-SCNC: 134 MMOL/L — LOW (ref 135–145)
WBC # BLD: 8.61 K/UL — SIGNIFICANT CHANGE UP (ref 3.8–10.5)
WBC # FLD AUTO: 8.61 K/UL — SIGNIFICANT CHANGE UP (ref 3.8–10.5)

## 2025-01-07 PROCEDURE — 76705 ECHO EXAM OF ABDOMEN: CPT | Mod: 26

## 2025-01-07 PROCEDURE — 99231 SBSQ HOSP IP/OBS SF/LOW 25: CPT

## 2025-01-07 PROCEDURE — 71045 X-RAY EXAM CHEST 1 VIEW: CPT | Mod: 26

## 2025-01-07 RX ORDER — ALPRAZOLAM 0.25 MG/1
0.5 TABLET ORAL ONCE
Refills: 0 | Status: DISCONTINUED | OUTPATIENT
Start: 2025-01-07 | End: 2025-01-07

## 2025-01-07 RX ORDER — ALPRAZOLAM 0.25 MG/1
0.5 TABLET ORAL EVERY 12 HOURS
Refills: 0 | Status: DISCONTINUED | OUTPATIENT
Start: 2025-01-07 | End: 2025-01-09

## 2025-01-07 RX ADMIN — LEVOTHYROXINE SODIUM 100 MICROGRAM(S): 175 TABLET ORAL at 21:13

## 2025-01-07 RX ADMIN — ATORVASTATIN CALCIUM 20 MILLIGRAM(S): 40 TABLET, FILM COATED ORAL at 21:13

## 2025-01-07 RX ADMIN — ALPRAZOLAM 0.5 MILLIGRAM(S): 0.25 TABLET ORAL at 09:55

## 2025-01-07 RX ADMIN — HEPARIN SODIUM 1100 UNIT(S)/HR: 1000 INJECTION, SOLUTION INTRAVENOUS; SUBCUTANEOUS at 07:17

## 2025-01-07 RX ADMIN — HEPARIN SODIUM 1100 UNIT(S)/HR: 1000 INJECTION, SOLUTION INTRAVENOUS; SUBCUTANEOUS at 11:17

## 2025-01-07 RX ADMIN — HEPARIN SODIUM 1100 UNIT(S)/HR: 1000 INJECTION, SOLUTION INTRAVENOUS; SUBCUTANEOUS at 08:09

## 2025-01-07 RX ADMIN — ALPRAZOLAM 0.5 MILLIGRAM(S): 0.25 TABLET ORAL at 17:59

## 2025-01-07 RX ADMIN — HEPARIN SODIUM 1100 UNIT(S)/HR: 1000 INJECTION, SOLUTION INTRAVENOUS; SUBCUTANEOUS at 19:04

## 2025-01-07 RX ADMIN — Medication 1 TABLET(S): at 11:12

## 2025-01-07 NOTE — CONSULT NOTE ADULT - SUBJECTIVE AND OBJECTIVE BOX
Interventional Radiology    Evaluate for Procedure: therapeutic para     HPI:  89yo with PMH HLD, Hypothyroidism and Anxiety who presented 3 days ago with 2-3 week history of acute abdominal bloating and loss of appetite. On evaluation in the ED, patient was found to have a DVT/PE as well as large volume ascites, peritoneal carcinomatosis, pelvic LAD and a complex pelvic mass concerning for malignancy of GYN origin. Patient was admitted to Medicine for therapeutic anticoagulation with heparin gtt and further malignancy workup. Patient underwent a paracentesis with IR on 1/3/25, 6800cc ascitesw/ path +malignant cells, adenoCa. Heme/onc following.     IR now  consulted  for repeat para prior to discharge.      Allergies: No Known Allergies    Medications (Abx/Cardiac/Anticoagulation/Blood Products)    heparin  Infusion.: 1100 Unit(s)/Hr IV Continuous ( @ 08:10)    Data:    T(C): 36.6  HR: 89  BP: 128/77  RR: 18  SpO2: 96%    -WBC 8.61 / HgB 12.3 / Hct 36.5 / Plt 258  -Na 134 / Cl 105 / BUN 11 / Glucose 108  -K 3.9 / CO2 20 / Cr 0.78  -ALT -- / Alk Phos -- / T.Bili --  -INR -- / PTT 70.3    Radiology: < from: US Abdomen Limited (25 @ 16:04) >    IMPRESSION:  Evidence of mild to moderate ascites. Largest pocket in the left lower   quadrant measures 8 cm.  Bilateral pleural effusions.      < end of copied text >      Assessment/Plan:   89yo with PMH HLD, Hypothyroidism and Anxiety who presented 3 days ago with 2-3 week history of acute abdominal bloating and loss of appetite. On evaluation in the ED, patient was found to have a DVT/PE as well as large volume ascites, peritoneal carcinomatosis, pelvic LAD and a complex pelvic mass concerning for malignancy of GYN origin. Patient was admitted to Medicine for therapeutic anticoagulation with heparin gtt and further malignancy workup. Patient underwent a paracentesis with IR on 1/3/25, 6800cc ascitesw/ path +malignant cells, adenoCa. Heme/onc following.     IR now  consulted  for repeat para prior to discharge.      -Will plan for therapeutic para on  with IR  -Please place IR procedure order under PA Jalili  -performed under local anesthesia. patient does not need to be npo.   - on hep gtt for dvt/pe, IR will call team prior to para procedure and coordinate when to hold hep gtt.     -Above d/w primary team      Any questions or concerns regarding above please reach out to IR:   -Available on [employer] teams  -During working hours (7a-5p): call [phone]  -Emergent issues after 5pm: page: [phone]  -Non-emergent consults: Please place a [place] order "IR Consult" with an appropriate callback number  -Scheduling questions: [phone]  -Clinic/Outpatient [phone]   Interventional Radiology    Evaluate for Procedure: therapeutic para     HPI:  89yo with PMH HLD, Hypothyroidism and Anxiety who presented 3 days ago with 2-3 week history of acute abdominal bloating and loss of appetite. On evaluation in the ED, patient was found to have a DVT/PE as well as large volume ascites, peritoneal carcinomatosis, pelvic LAD and a complex pelvic mass concerning for malignancy of GYN origin. Patient was admitted to Medicine for therapeutic anticoagulation with heparin gtt and further malignancy workup. Patient underwent a paracentesis with IR on 1/3/25, 6800cc ascitesw/ path +malignant cells, adenoCa. Heme/onc following.     IR now  consulted  for repeat para prior to discharge.      Allergies: No Known Allergies    Medications (Abx/Cardiac/Anticoagulation/Blood Products)    heparin  Infusion.: 1100 Unit(s)/Hr IV Continuous ( @ 08:10)    Data:    T(C): 36.6  HR: 89  BP: 128/77  RR: 18  SpO2: 96%    -WBC 8.61 / HgB 12.3 / Hct 36.5 / Plt 258  -Na 134 / Cl 105 / BUN 11 / Glucose 108  -K 3.9 / CO2 20 / Cr 0.78  -ALT -- / Alk Phos -- / T.Bili --  -INR -- / PTT 70.3    Radiology: < from: US Abdomen Limited (25 @ 16:04) >    IMPRESSION:  Evidence of mild to moderate ascites. Largest pocket in the left lower   quadrant measures 8 cm.  Bilateral pleural effusions.      < end of copied text >      Assessment/Plan:   89yo with PMH HLD, Hypothyroidism and Anxiety who presented 3 days ago with 2-3 week history of acute abdominal bloating and loss of appetite. On evaluation in the ED, patient was found to have a DVT/PE as well as large volume ascites, peritoneal carcinomatosis, pelvic LAD and a complex pelvic mass concerning for malignancy of GYN origin. Patient was admitted to Medicine for therapeutic anticoagulation with heparin gtt and further malignancy workup. Patient underwent a paracentesis with IR on 1/3/25, 6800cc ascitesw/ path +malignant cells, adenoCa. Heme/onc following.     IR now  consulted  for repeat para prior to discharge.      -Will plan for therapeutic para on  with IR  -Please place IR procedure order under PA [facility]alili  -performed under local anesthesia. patient does not need to be npo.   - on hep gtt for dvt/pe, IR will call team prior to para procedure and coordinate when to hold hep gtt.     -Above d/w primary team      Any questions or concerns regarding above please reach out to IR:   -Available on [employer] teams  -During working hours (7a-5p): call [phone]  -Emergent issues after 5pm: page: [phone]  -Non-emergent consults: Please place a [place] order "IR Consult" with an appropriate callback number  -Scheduling questions: [phone]  -Clinic/Outpatient [phone]

## 2025-01-07 NOTE — CONSULT NOTE ADULT - CONSULT REASON
therapeutic para prior to discharge planned for 1/9
Ovarian mass
Right adnexal mass biopsy
concern for ovarian malignancy
Hospital care
Paracentesis

## 2025-01-07 NOTE — PROGRESS NOTE ADULT - ASSESSMENT
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                          DVT and PE likely related to inactivity post op TKR and malignancy.                          Wheezing and mild dyspnea r/o pneumonia.  Temperature was 99.6 last night, higher than current other                         readings.  O2 sats have been good.                          Some degree of ascites reaccumulation.                           Mild hyponatremia.    Plan:     Oncology and GYN-Onc on board.                 To get neoadjuvant chemo                     If OK with oncology can switch to Eliquis 10 bid up to 7 days unless can be shorter taking into account the days of                   IV heparan thus far.  Thereafter 5 mg bid..                     CXR regarding episode of wheezing and dyspnea last night.

## 2025-01-07 NOTE — CHART NOTE - NSCHARTNOTEFT_GEN_A_CORE
Patient will require a polyfly wheelchair due to her debility secondary to recurrent ascites, adnexal mass and PE. The  beneficiary has a mobility limitation that significantly impairs  his ability to participate in one or more MRADLs such as  toileting, feeding, dressing, grooming, and bathing in customary  locations in the home. The patient’s mobility limitation cannot  be sufficiently resolved by the use of an appropriately fitted cane  or walker. The patient is unable to ambulat with a walker. Use  of a manual wheelchair will significantly improve the  beneficiary’s ability to participate in MRADLs and the  beneficiary will use it on a regular basis in the home. The  beneficiary is able and willing to use the wheelchair in the  home. The beneficiary cannot self-propel in a standard  wheelchair. The patient can self-propel in a polyfly wheelchair.  The beneficiary has sufficient upper extremity function and  other physical and mental capabilities needed to safely selfpropel the manual lightweight wheelchair that is provided in the  home during a typical day      In addition,  she will require a semi electric hospital.  She requires the head of the bed to be elevated  more than 30 degrees. Pillows and wedges are not effective.   Patient requires positioning of the body in ways not feasible  with an ordinary bed. Patient requires frequent repositioning to  alleviate pain. The member can independently affect the  adjustment by operating the control.      JACKIE Cuadra

## 2025-01-07 NOTE — CHART NOTE - NSCHARTNOTEFT_GEN_A_CORE
See prior consult note. Seen ~499v 1/6/25. Card provided. Disc clinical findings, markers, and pending cytology from paracentesis. Disc concern for gyn malignancy. Disc mgmt options, incl my rec for NACT with consideration of IDS. We disc my rationale for this, the assessment for and nature of IDS, as well as the overall outlook n answer to her queries. We disc my advice for a Med Onc consultation to begin to facilitate her plan of care. All Q/A. Will follow. See n in f/u 1/7/25 @ ~5p. Chart reviewed, incl H/O consultation note. Disc with pt the prelim cytology findings with IHC pending, in context of my prior review of her marker assessment. We disc the plan for NACT, with which she remains agreeable. We disc my req for her to see me as an outpt for exam and further disc. She will call to be seen over next several weeks; [facility] team also contacted. All Q/A.

## 2025-01-07 NOTE — CONSULT NOTE ADULT - REASON FOR ADMISSION
abdominal pain and swelling

## 2025-01-07 NOTE — CONSULT NOTE ADULT - CONSULT REQUESTED DATE/TIME
02-Jan-2025
06-Jan-2025 13:49
31-Dec-2024 19:11
02-Jan-2025 08:44
03-Jan-2025 16:42
07-Jan-2025 18:24

## 2025-01-08 ENCOUNTER — NON-APPOINTMENT (OUTPATIENT)
Age: 89
End: 2025-01-08

## 2025-01-08 ENCOUNTER — TRANSCRIPTION ENCOUNTER (OUTPATIENT)
Age: 89
End: 2025-01-08

## 2025-01-08 LAB
ANION GAP SERPL CALC-SCNC: 12 MMOL/L — SIGNIFICANT CHANGE UP (ref 5–17)
APTT BLD: 73.8 SEC — HIGH (ref 24.5–35.6)
BUN SERPL-MCNC: 10 MG/DL — SIGNIFICANT CHANGE UP (ref 7–23)
CALCIUM SERPL-MCNC: 8 MG/DL — LOW (ref 8.4–10.5)
CHLORIDE SERPL-SCNC: 104 MMOL/L — SIGNIFICANT CHANGE UP (ref 96–108)
CO2 SERPL-SCNC: 20 MMOL/L — LOW (ref 22–31)
CREAT SERPL-MCNC: 0.73 MG/DL — SIGNIFICANT CHANGE UP (ref 0.5–1.3)
CULTURE RESULTS: SIGNIFICANT CHANGE UP
EGFR: 79 ML/MIN/1.73M2 — SIGNIFICANT CHANGE UP
GLUCOSE SERPL-MCNC: 86 MG/DL — SIGNIFICANT CHANGE UP (ref 70–99)
HCT VFR BLD CALC: 39.5 % — SIGNIFICANT CHANGE UP (ref 34.5–45)
HGB BLD-MCNC: 12.5 G/DL — SIGNIFICANT CHANGE UP (ref 11.5–15.5)
MCHC RBC-ENTMCNC: 30.6 PG — SIGNIFICANT CHANGE UP (ref 27–34)
MCHC RBC-ENTMCNC: 31.6 G/DL — LOW (ref 32–36)
MCV RBC AUTO: 96.8 FL — SIGNIFICANT CHANGE UP (ref 80–100)
NRBC # BLD: 0 /100 WBCS — SIGNIFICANT CHANGE UP (ref 0–0)
PLATELET # BLD AUTO: 278 K/UL — SIGNIFICANT CHANGE UP (ref 150–400)
POTASSIUM SERPL-MCNC: 3.9 MMOL/L — SIGNIFICANT CHANGE UP (ref 3.5–5.3)
POTASSIUM SERPL-SCNC: 3.9 MMOL/L — SIGNIFICANT CHANGE UP (ref 3.5–5.3)
RBC # BLD: 4.08 M/UL — SIGNIFICANT CHANGE UP (ref 3.8–5.2)
RBC # FLD: 16.6 % — HIGH (ref 10.3–14.5)
SODIUM SERPL-SCNC: 136 MMOL/L — SIGNIFICANT CHANGE UP (ref 135–145)
SPECIMEN SOURCE: SIGNIFICANT CHANGE UP
WBC # BLD: 7.59 K/UL — SIGNIFICANT CHANGE UP (ref 3.8–10.5)
WBC # FLD AUTO: 7.59 K/UL — SIGNIFICANT CHANGE UP (ref 3.8–10.5)

## 2025-01-08 RX ORDER — APIXABAN 5 MG/1
1 TABLET, FILM COATED ORAL
Qty: 60 | Refills: 0
Start: 2025-01-08 | End: 2025-02-06

## 2025-01-08 RX ORDER — LEVOTHYROXINE SODIUM 175 UG/1
1 TABLET ORAL
Qty: 30 | Refills: 0
Start: 2025-01-08 | End: 2025-02-06

## 2025-01-08 RX ORDER — APIXABAN 5 MG/1
5 TABLET, FILM COATED ORAL EVERY 12 HOURS
Refills: 0 | Status: DISCONTINUED | OUTPATIENT
Start: 2025-01-08 | End: 2025-01-09

## 2025-01-08 RX ORDER — LEVOTHYROXINE SODIUM 175 UG/1
1 TABLET ORAL
Refills: 0 | DISCHARGE

## 2025-01-08 RX ADMIN — ALPRAZOLAM 0.5 MILLIGRAM(S): 0.25 TABLET ORAL at 21:48

## 2025-01-08 RX ADMIN — Medication 1 TABLET(S): at 12:38

## 2025-01-08 RX ADMIN — ATORVASTATIN CALCIUM 20 MILLIGRAM(S): 40 TABLET, FILM COATED ORAL at 21:48

## 2025-01-08 RX ADMIN — ALPRAZOLAM 0.5 MILLIGRAM(S): 0.25 TABLET ORAL at 12:38

## 2025-01-08 RX ADMIN — SODIUM CHLORIDE 50 MILLILITER(S): 9 INJECTION, SOLUTION INTRAMUSCULAR; INTRAVENOUS; SUBCUTANEOUS at 05:56

## 2025-01-08 RX ADMIN — HEPARIN SODIUM 1100 UNIT(S)/HR: 1000 INJECTION, SOLUTION INTRAVENOUS; SUBCUTANEOUS at 07:05

## 2025-01-08 RX ADMIN — APIXABAN 5 MILLIGRAM(S): 5 TABLET, FILM COATED ORAL at 17:43

## 2025-01-08 RX ADMIN — LEVOTHYROXINE SODIUM 100 MICROGRAM(S): 175 TABLET ORAL at 21:47

## 2025-01-08 NOTE — DISCHARGE NOTE PROVIDER - CARE PROVIDER_API CALL
Crescencio Cole  Hematology  [address] [place] [id number]  Phone: [phone]  Fax: [phone]  Follow Up Time: 2 weeks

## 2025-01-08 NOTE — DISCHARGE NOTE NURSING/CASE MANAGEMENT/SOCIAL WORK - PATIENT PORTAL LINK FT
You can access the FollowMyHealth Patient Portal offered by [facility] by registering at the following website: [email or web address]. By joining [facility]’s FollowMyHealth portal, you will also be able to view your health information using other applications (apps) compatible with our system.

## 2025-01-08 NOTE — DISCHARGE NOTE PROVIDER - NSDCCPCAREPLAN_GEN_ALL_CORE_FT
PRINCIPAL DISCHARGE DIAGNOSIS  Diagnosis: Carcinomatosis peritonei  Assessment and Plan of Treatment: You presented with carcinomatosis peritonei upon admission. You underwent paracentesis on 1/3/25, cytology showed positive malignant cells, adenocarcinoma. Oncology team followed during your stay. Follow up with oncology as outpatient Dr. Crescencio Cole at [facility].      SECONDARY DISCHARGE DIAGNOSES  Diagnosis: Pulmonary embolism  Assessment and Plan of Treatment: Take your anticoagulation (eliquis) as directed.  Follow up with your health care provider within one week. Call for appointment.  If you develop shortness of breath or if your shortness of breath worsens call your Health Care Provider or go to the Emergency Department.      Diagnosis: DVT, lower extremity  Assessment and Plan of Treatment: Take your eliquis as prescribed.  Walking is encouraged, increase activity as tolerated.  If you develop new leg pain, swelling, and/or redness contact your healthcare provider.  If you develop new chest pain with difficulty breathing, a rapid heart rate and/or a feeling of passing out call emergency medical services 911.

## 2025-01-08 NOTE — DISCHARGE NOTE PROVIDER - HOSPITAL COURSE
HPI:  Patient is an 89-year-old history of osteoarthritis, hypothyroid , HLD , anxiety,   presents for worsening abdominal swelling and pain over the past month.   Patient reports increased abdominal swelling associated with discomfort for several weeks, she reports poor appetite and decreased PO intake during this time. Over the past several days she reports decreased bowel movements. She was advised to come to the hospital about one week ago , but waited to see if there’s any improvement after taking laxatives and stool softeners. She reports no chest pain and no SOB    Of note she had a left knee replacement  about two weeks prior to admission. She reports no vomiting, but feel nauseated at times, no  fevers or chills. She reports bilateral lower extremity edema which started after her knee replacement   (31 Dec 2024 21:52)    Hospital Course:  Patient was admitted for RLL pulmonary embolism and  further malignancy workup. Patient also with right femoral vein DVT. Managed with heparin gtt x 7 days, transitioned to PO eliquis 5 mg BID as per pharmacy. S/p paracentesis with IR on 1/3/25, 6.8 L drained, peritoneal fluid cytology resulted with positive malignant cells, adenocarcinoma. GYN-Oncology and oncology team followed. As per oncology, "given extent of disease, patient would require neoadjuvant chemotherapy and not candidate for upfront debulking." Patient to follow up with oncology as outpatient follow up at [facility]. Patient is medically stable to be discharged per attending Dr. Gross.     Important Medication Changes and Reason:  see med recc  Active or Pending Issues Requiring Follow-up:  oncology  Advanced Directives:   [ ] Full code  [ ] DNR  [ ] Hospice    Discharge Diagnoses:  carcinomatosis peritonei  PE  DVT

## 2025-01-08 NOTE — PROGRESS NOTE ADULT - ASSESSMENT
Problem: Venous Thromboembolism (VTW)/Deep Vein Thrombosis (DVT) Prevention:  Goal: Patient will participate in Venous Thrombosis (VTE)/Deep Vein Thrombosis (DVT)Prevention Measures  Outcome: PROGRESSING AS EXPECTED    Intervention: Encourage patient to perform ankle flex, foot rotation, and knee flex exercises in addition to other prophylatic measures every hour while awake  SCDs in place.  Encourage to perform flexion of the feet while in bed and awake, verbalize understanding.  Encourage ambulation TID.      Problem: Pain Management  Goal: Pain level will decrease to patient's comfort goal  Outcome: PROGRESSING AS EXPECTED    Intervention: Follow pain managment plan developed in collaboration with patient and Interdisciplinary Team  Pain assessment q4h or q2h after medication intervention.  Encourage patient to report pain, verbalize understanding. Medicate PRN         Impression:  Malignant ascites with carcinomatosis, adenocarcinoma.  Staining is pending.                       DVT and PE likely related to inactivity post op TKR and malignancy.                        Wheezing by patient report without definitive evidence of pneumnia on CXR and clinically without infection.                            Some degree of ascites reaccumulation.                           Mild hyponatremia now resolved    Plan:     No need for paracentesis unless additional tissue is needed by oncology ? if staining not diagnostic for origin.  Need               to find out from oncology.                 To get neoadjuvant chemo as outpatient.                  If no further procedures needed, no objection to discharge pending DME and home services arranged.                 At discharge, switch to Eliquis.  Patient has already had 7 days of IV heparin.  Find out from pharmacy if 10 mg bid                dose for 7 days is still needed at this juncture.

## 2025-01-08 NOTE — DISCHARGE NOTE PROVIDER - NSDCMRMEDTOKEN_GEN_ALL_CORE_FT
ALPRAZolam 0.5 mg oral tablet: 1 tab(s) orally 2 times a day as needed for  anxiety  Aspirin EC 81 mg oral delayed release tablet: 1 tab(s) orally once a day  Eliquis 5 mg oral tablet: 1 tab(s) orally every 12 hours  levothyroxine 100 mcg (0.1 mg) oral tablet: 1 tab(s) orally once a day  ondansetron 4 mg oral tablet: 1 tab(s) orally every 8 hours as needed for  nausea  pantoprazole 40 mg oral delayed release tablet: 1 tab(s) orally once a day  Polyfly wheelchair: Use as directed.  C78.6, I26.99  pravastatin 80 mg oral tablet: 1 tab(s) orally once a day  Refresh ophthalmic solution: 1 drop(s) in each eye as needed for  dry eyes  Semi-electric hospital bed: Use as directed.  ICD 10: I26.99, C78.6  Tylenol Caplet Extra Strength 500 mg oral tablet: 2 tab(s) orally only as needed for pain  Vitamin/Supplements: Multivitamin tablet, Magnesium tablet: 1 tablet orally once a day   ALPRAZolam 0.5 mg oral tablet: 1 tab(s) orally 2 times a day as needed for  anxiety  Aspirin EC 81 mg oral delayed release tablet: 1 tab(s) orally once a day  Eliquis 5 mg oral tablet: 1 tab(s) orally every 12 hours  levothyroxine 100 mcg (0.1 mg) oral tablet: 1 tab(s) orally once a day  ondansetron 4 mg oral tablet: 1 tab(s) orally every 8 hours as needed for  nausea  pantoprazole 40 mg oral delayed release tablet: 1 tab(s) orally once a day  pravastatin 80 mg oral tablet: 1 tab(s) orally once a day  Refresh ophthalmic solution: 1 drop(s) in each eye as needed for  dry eyes  Tylenol Caplet Extra Strength 500 mg oral tablet: 2 tab(s) orally only as needed for pain  Vitamin/Supplements: Multivitamin tablet, Magnesium tablet: 1 tablet orally once a day

## 2025-01-08 NOTE — DISCHARGE NOTE NURSING/CASE MANAGEMENT/SOCIAL WORK - FINANCIAL ASSISTANCE
[facility] provides services at a reduced cost to those who are determined to be eligible through [facility]’s financial assistance program. Information regarding [facility]’s financial assistance program can be found by going to [email or web address] or by calling [phone].

## 2025-01-09 VITALS
OXYGEN SATURATION: 96 % | DIASTOLIC BLOOD PRESSURE: 84 MMHG | TEMPERATURE: 97 F | RESPIRATION RATE: 17 BRPM | HEART RATE: 108 BPM | SYSTOLIC BLOOD PRESSURE: 144 MMHG

## 2025-01-09 LAB
HCT VFR BLD CALC: 36.9 % — SIGNIFICANT CHANGE UP (ref 34.5–45)
HGB BLD-MCNC: 11.8 G/DL — SIGNIFICANT CHANGE UP (ref 11.5–15.5)
MCHC RBC-ENTMCNC: 30.4 PG — SIGNIFICANT CHANGE UP (ref 27–34)
MCHC RBC-ENTMCNC: 32 G/DL — SIGNIFICANT CHANGE UP (ref 32–36)
MCV RBC AUTO: 95.1 FL — SIGNIFICANT CHANGE UP (ref 80–100)
NRBC # BLD: 0 /100 WBCS — SIGNIFICANT CHANGE UP (ref 0–0)
PLATELET # BLD AUTO: 256 K/UL — SIGNIFICANT CHANGE UP (ref 150–400)
RBC # BLD: 3.88 M/UL — SIGNIFICANT CHANGE UP (ref 3.8–5.2)
RBC # FLD: 16.5 % — HIGH (ref 10.3–14.5)
WBC # BLD: 7.8 K/UL — SIGNIFICANT CHANGE UP (ref 3.8–10.5)
WBC # FLD AUTO: 7.8 K/UL — SIGNIFICANT CHANGE UP (ref 3.8–10.5)

## 2025-01-09 PROCEDURE — 87102 FUNGUS ISOLATION CULTURE: CPT

## 2025-01-09 PROCEDURE — 88342 IMHCHEM/IMCYTCHM 1ST ANTB: CPT

## 2025-01-09 PROCEDURE — 86901 BLOOD TYPING SEROLOGIC RH(D): CPT

## 2025-01-09 PROCEDURE — 89051 BODY FLUID CELL COUNT: CPT

## 2025-01-09 PROCEDURE — 88112 CYTOPATH CELL ENHANCE TECH: CPT

## 2025-01-09 PROCEDURE — 82803 BLOOD GASES ANY COMBINATION: CPT

## 2025-01-09 PROCEDURE — 82378 CARCINOEMBRYONIC ANTIGEN: CPT

## 2025-01-09 PROCEDURE — 84443 ASSAY THYROID STIM HORMONE: CPT

## 2025-01-09 PROCEDURE — 84157 ASSAY OF PROTEIN OTHER: CPT

## 2025-01-09 PROCEDURE — C8929: CPT

## 2025-01-09 PROCEDURE — 71275 CT ANGIOGRAPHY CHEST: CPT | Mod: MC

## 2025-01-09 PROCEDURE — 84132 ASSAY OF SERUM POTASSIUM: CPT

## 2025-01-09 PROCEDURE — P9047: CPT

## 2025-01-09 PROCEDURE — 85014 HEMATOCRIT: CPT

## 2025-01-09 PROCEDURE — 87205 SMEAR GRAM STAIN: CPT

## 2025-01-09 PROCEDURE — 80048 BASIC METABOLIC PNL TOTAL CA: CPT

## 2025-01-09 PROCEDURE — 82042 OTHER SOURCE ALBUMIN QUAN EA: CPT

## 2025-01-09 PROCEDURE — 80053 COMPREHEN METABOLIC PANEL: CPT

## 2025-01-09 PROCEDURE — 85027 COMPLETE CBC AUTOMATED: CPT

## 2025-01-09 PROCEDURE — 71046 X-RAY EXAM CHEST 2 VIEWS: CPT

## 2025-01-09 PROCEDURE — 82435 ASSAY OF BLOOD CHLORIDE: CPT

## 2025-01-09 PROCEDURE — 85730 THROMBOPLASTIN TIME PARTIAL: CPT

## 2025-01-09 PROCEDURE — 86900 BLOOD TYPING SEROLOGIC ABO: CPT

## 2025-01-09 PROCEDURE — 83690 ASSAY OF LIPASE: CPT

## 2025-01-09 PROCEDURE — 49083 ABD PARACENTESIS W/IMAGING: CPT

## 2025-01-09 PROCEDURE — 88305 TISSUE EXAM BY PATHOLOGIST: CPT

## 2025-01-09 PROCEDURE — 87015 SPECIMEN INFECT AGNT CONCNTJ: CPT

## 2025-01-09 PROCEDURE — 88341 IMHCHEM/IMCYTCHM EA ADD ANTB: CPT

## 2025-01-09 PROCEDURE — 82945 GLUCOSE OTHER FLUID: CPT

## 2025-01-09 PROCEDURE — 76705 ECHO EXAM OF ABDOMEN: CPT

## 2025-01-09 PROCEDURE — 84484 ASSAY OF TROPONIN QUANT: CPT

## 2025-01-09 PROCEDURE — 71045 X-RAY EXAM CHEST 1 VIEW: CPT

## 2025-01-09 PROCEDURE — 85018 HEMOGLOBIN: CPT

## 2025-01-09 PROCEDURE — 94640 AIRWAY INHALATION TREATMENT: CPT

## 2025-01-09 PROCEDURE — 80076 HEPATIC FUNCTION PANEL: CPT

## 2025-01-09 PROCEDURE — 88360 TUMOR IMMUNOHISTOCHEM/MANUAL: CPT

## 2025-01-09 PROCEDURE — 93970 EXTREMITY STUDY: CPT

## 2025-01-09 PROCEDURE — 36415 COLL VENOUS BLD VENIPUNCTURE: CPT

## 2025-01-09 PROCEDURE — 87075 CULTR BACTERIA EXCEPT BLOOD: CPT

## 2025-01-09 PROCEDURE — 86850 RBC ANTIBODY SCREEN: CPT

## 2025-01-09 PROCEDURE — 99285 EMERGENCY DEPT VISIT HI MDM: CPT

## 2025-01-09 PROCEDURE — 97162 PT EVAL MOD COMPLEX 30 MIN: CPT

## 2025-01-09 PROCEDURE — 96375 TX/PRO/DX INJ NEW DRUG ADDON: CPT

## 2025-01-09 PROCEDURE — 84295 ASSAY OF SERUM SODIUM: CPT

## 2025-01-09 PROCEDURE — C1729: CPT

## 2025-01-09 PROCEDURE — 86304 IMMUNOASSAY TUMOR CA 125: CPT

## 2025-01-09 PROCEDURE — 82947 ASSAY GLUCOSE BLOOD QUANT: CPT

## 2025-01-09 PROCEDURE — 82330 ASSAY OF CALCIUM: CPT

## 2025-01-09 PROCEDURE — 96374 THER/PROPH/DIAG INJ IV PUSH: CPT

## 2025-01-09 PROCEDURE — 83880 ASSAY OF NATRIURETIC PEPTIDE: CPT

## 2025-01-09 PROCEDURE — 83615 LACTATE (LD) (LDH) ENZYME: CPT

## 2025-01-09 PROCEDURE — 87070 CULTURE OTHR SPECIMN AEROBIC: CPT

## 2025-01-09 PROCEDURE — 74177 CT ABD & PELVIS W/CONTRAST: CPT | Mod: MC

## 2025-01-09 PROCEDURE — 85025 COMPLETE CBC W/AUTO DIFF WBC: CPT

## 2025-01-09 PROCEDURE — 83605 ASSAY OF LACTIC ACID: CPT

## 2025-01-09 PROCEDURE — 85610 PROTHROMBIN TIME: CPT

## 2025-01-09 RX ADMIN — APIXABAN 5 MILLIGRAM(S): 5 TABLET, FILM COATED ORAL at 05:47

## 2025-01-09 NOTE — PROGRESS NOTE ADULT - ASSESSMENT
Impression:  Malignant ascites with carcinomatosis, adenocarcinoma, Mullerian origin.                       DVT and PE likely related to inactivity post op TKR and malignancy.                       Some degree of ascites reaccumulation.                             Plan:       To get neoadjuvant chemo as outpatient.                Discharge pending [facility] and home services arranged.                 Continue Eliquis 5 mg q12h.

## 2025-01-09 NOTE — PROGRESS NOTE ADULT - SUBJECTIVE AND OBJECTIVE BOX
Ms. Oksana Durbin seen n room 452 [facility] at [facility] for Dr. Tim Gross  Lying flat in bed.  Expressing concern that she might have cancer.  Denies shortness of breath or chest discomfort.  States she has not eaten in days and no recent bowel movement.  States she has no discomfort and has a little appetite.  Concern over planned vaginal sonogram.      Review Of Systems:  Constitutional: No Fever, Chills,  No Fatigue, Increase in abdominal girth and swelling of legs  HEENT: No Blurred vision, No Headache   Respiratory: No Cough, No sputum production, No Wheezing, No Shortness of breath  Cardiovascular: No Chest Pain, No Palpitations, No Lightheadedness, No Falling, No Syncope, No PEÑA, No PND, No Orthopnea, No Peripehral Edema  Gastrointestinal: No Abdominal Pain, No Diarrhea, No Constipation, No Nausea, No Vomiting, Normal Appetite   Genitourinary: No Dysuria  Extremities: + Swelling, No Claudication,   Neurologic:  No Focal deficit, No Weakness, No Dysphagia, No Paresthesias, No Syncope  Skin: No Rash,  No Ecchymoses, No Wounds, No Tenderness, No Drainage     Medications:  acetaminophen     Tablet .. 650 milliGRAM(s) Oral every 6 hours PRN  ALPRAZolam 0.5 milliGRAM(s) Oral every 12 hours  atorvastatin 20 milliGRAM(s) Oral at bedtime  bisacodyl Suppository 10 milliGRAM(s) Rectal daily PRN  heparin   Injectable 6000 Unit(s) IV Push every 6 hours PRN  heparin   Injectable 3000 Unit(s) IV Push every 6 hours PRN  heparin  Infusion.  Unit(s)/Hr IV Continuous <Continuous>  levothyroxine 75 MICROGram(s) Oral daily  melatonin 3 milliGRAM(s) Oral at bedtime PRN  Nephro-salvatore 1 Tablet(s) Oral daily  ondansetron Injectable 4 milliGRAM(s) IV Push every 8 hours PRN  polyethylene glycol 3350 17 Gram(s) Oral daily  senna 2 Tablet(s) Oral at bedtime    PMH/PSH/FH/SH: Unchanged  Vitals:  T(C): 36.8 (01-01-25 @ 04:56), Max: 36.8 (12-31-24 @ 21:28)  HR: 87 (01-01-25 @ 04:56) (83 - 100)  BP: 121/75 (01-01-25 @ 04:56) (107/66 - 156/94)  BP(mean): 111 (12-31-24 @ 13:25) (111 - 111)  RR: 18 (01-01-25 @ 04:56) (18 - 20)  SpO2: 93% (01-01-25 @ 04:56) (93% - 100%)  Wt(kg): --  Daily Height in cm: 152.4 (31 Dec 2024 23:49)    Daily     Physical Exam:  Appearance:  Normal, NAD  Eyes: PERRL, EOMI  HENT:  Dry oral muscosa, NC/AT  Neck: without hepatojugular reflux, carotid 2+ equal without bruits  No Thyromegaly  Cardiovascular: normal regular S1, physiologic split S2,  1/6 systolic ejection murmur in aortic region   Respiratory: Clear to percussion and auscultation bilaterally  Gastrointestinal: Non-tender, distended, very hypoactive BS+, No masses  Neurologic: Non-focal, No focal neurologic deficits  Skin: No rashes, No ecchymoses, No cyanosis, trace to 1+ bilateral lower extremity edema  Pulses-no palpable pulse in either lower extremity , both feet warm    12-31    133[L]  |  97  |  16  ----------------------------<  75  3.8   |  20[L]  |  0.98    Ca    9.0      31 Dec 2024 20:06    TPro  7.5  /  Alb  2.8[L]  /  TBili  0.9  /  DBili  x   /  AST  29  /  ALT  15  /  AlkPhos  76  12-31    PT/INR - ( 31 Dec 2024 19:15 )   PT: 12.2 sec;   INR: 1.06 ratio         PTT - ( 01 Jan 2025 02:18 )  PTT:>200.0 sec      
No new c/o.  Mild abdominal discomfort.    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         REVIEW OF SYSTEMS:  CARDIOVASCULAR: No chest pain, dyspnea or palpitations  All other review of systems is negative unless indicated above    Medications:  acetaminophen     Tablet .. 650 milliGRAM(s) Oral every 6 hours PRN  ALPRAZolam 0.5 milliGRAM(s) Oral every 12 hours  apixaban 5 milliGRAM(s) Oral every 12 hours  atorvastatin 20 milliGRAM(s) Oral at bedtime  bisacodyl Suppository 10 milliGRAM(s) Rectal daily PRN  levothyroxine 100 MICROGram(s) Oral daily  melatonin 3 milliGRAM(s) Oral at bedtime PRN  Nephro-salvatore 1 Tablet(s) Oral daily  ondansetron Injectable 4 milliGRAM(s) IV Push every 8 hours PRN  polyethylene glycol 3350 17 Gram(s) Oral daily  senna 2 Tablet(s) Oral at bedtime  sodium chloride 0.9%. 1000 milliLiter(s) IV Continuous <Continuous>      Physical Exam:  Vitals:  T(C): 36.3 (01-09-25 @ 04:29), Max: 36.6 (01-08-25 @ 21:18)  HR: 81 (01-09-25 @ 04:29) (81 - 96)  BP: 125/75 (01-09-25 @ 04:29) (125/75 - 143/78)  BP(mean): --  RR: 17 (01-09-25 @ 04:29) (17 - 18)  SpO2: 96% (01-09-25 @ 04:29) (96% - 97%)  Wt(kg): --  Daily     Daily   I&O's Summary    08 Jan 2025 07:01  -  09 Jan 2025 07:00  --------------------------------------------------------  IN: 100 mL / OUT: 400 mL / NET: -300 mL      Appearance:  Normal, NAD  Eyes:  EOMI  HEENT: Dry oral muscosa, NC/AT  Neck:  No JVD  Respiratory: Clear to auscultation bilaterally  Cardiovascular: Normal S1 and S2 without murmurs, rubs or gallops  Abdomen:   Softly distended, mild and not increased compared to yesterday.  Minimal tenderness to palpation  Extremities: 1/2 + BL leg edema        01.09    Complete Blood Count Repeat Every 24 Hours X 3 Days (01.09.25 @ 06:43)   Nucleated RBC: 0 /100 WBCs  WBC Count: 7.80 K/uL  RBC Count: 3.88 M/uL  Hemoglobin: 11.8 g/dL  Hematocrit: 36.9 %  Mean Cell Volume: 95.1 fl  Mean Cell Hemoglobin: 30.4 pg  Mean Cell Hemoglobin Conc: 32.0 g/dL  Red Cell Distrib Width: 16.5 %  Platelet Count - Automated: 256 K/uL      01-08    136  |  104  |  10  ----------------------------<  86  3.9   |  20[L]  |  0.73    Ca    8.0[L]      08 Jan 2025 07:29      PTT - ( 08 Jan 2025 07:29 )  PTT:73.8 sec              ECG:    Echo:    Stress Testing:     Cath:    Imaging:    Interpretation of Telemetry:    
No c/o pain.  Minimal nausea.  NOt eating but drinking small amounts.    Refused bowel regimen yesterday.        REVIEW OF SYSTEMS:  CARDIOVASCULAR: No chest pain, dyspnea or palpitations  All other review of systems is negative unless indicated above    Medications:  acetaminophen     Tablet .. 650 milliGRAM(s) Oral every 6 hours PRN  ALPRAZolam 0.5 milliGRAM(s) Oral every 12 hours  atorvastatin 20 milliGRAM(s) Oral at bedtime  bisacodyl Suppository 10 milliGRAM(s) Rectal daily PRN  heparin   Injectable 6000 Unit(s) IV Push every 6 hours PRN  heparin   Injectable 3000 Unit(s) IV Push every 6 hours PRN  heparin  Infusion.  Unit(s)/Hr IV Continuous <Continuous>  levothyroxine 75 MICROGram(s) Oral daily  melatonin 3 milliGRAM(s) Oral at bedtime PRN  Nephro-salvatore 1 Tablet(s) Oral daily  ondansetron Injectable 4 milliGRAM(s) IV Push every 8 hours PRN  polyethylene glycol 3350 17 Gram(s) Oral daily  senna 2 Tablet(s) Oral at bedtime  sodium chloride 0.9%. 1000 milliLiter(s) IV Continuous <Continuous>      Physical Exam:  Vitals:  T(C): 36.7 (01-02-25 @ 05:01), Max: 37.2 (01-01-25 @ 21:32)  HR: 95 (01-02-25 @ 05:01) (89 - 95)  BP: 121/72 (01-02-25 @ 05:01) (121/72 - 129/80)  BP(mean): --  RR: 18 (01-02-25 @ 05:01) (18 - 18)  SpO2: 95% (01-02-25 @ 05:01) (95% - 97%)  Wt(kg): --  Daily     Daily   I&O's Summary    01 Jan 2025 07:01  -  02 Jan 2025 07:00  --------------------------------------------------------  IN: 300 mL / OUT: 0 mL / NET: 300 mL      Appearance:  Normal, NAD  Eyes:  EOMI  HEENT: Dry oral muscosa, NC/AT  Neck:  No JVD  Respiratory: Clear to auscultation bilaterally  Cardiovascular: Normal S1 and S2 without murmurs, rubs or gallops  Abdomen:   BS absent.  Softly distended, non-tender without appreciable organomegaly or masses  Extremities: 2 + BL leg edema      01-02    Complete Blood Count in AM (01.02.25 @ 07:02)   Nucleated RBC: 0 /100 WBCs  WBC Count: 7.30 K/uL  RBC Count: 4.07 M/uL  Hemoglobin: 12.5 g/dL  Hematocrit: 37.9 %  Mean Cell Volume: 93.1 fl  Mean Cell Hemoglobin: 30.7 pg  Mean Cell Hemoglobin Conc: 33.0 g/dL  Red Cell Distrib Width: 16.0 %  Platelet Count - Automated: 340 K/uL    134[L]  |  103  |  16  ----------------------------<  103[H]  3.4[L]   |  19[L]  |  0.87    Ca    8.8      02 Jan 2025 07:01    TPro  7.5  /  Alb  2.8[L]  /  TBili  0.9  /  DBili  x   /  AST  29  /  ALT  15  /  AlkPhos  76  12-31    PT/INR - ( 31 Dec 2024 19:15 )   PT: 12.2 sec;   INR: 1.06 ratio         PTT - ( 02 Jan 2025 07:04 )  PTT:38.6 sec      
Patient was given nebulized bronchodilator last night because opf wheezing.  She describes mild dyspnea.    Cytopathology + adenocarcinoma with further stains in progress to determine etiology.    Notices slow recurrence of increased abdominal girth S/P paracentesis.        REVIEW OF SYSTEMS:  CARDIOVASCULAR: No chest pain, dyspnea or palpitations  All other review of systems is negative unless indicated above    Medications:  acetaminophen     Tablet .. 650 milliGRAM(s) Oral every 6 hours PRN  ALPRAZolam 0.5 milliGRAM(s) Oral every 12 hours  atorvastatin 20 milliGRAM(s) Oral at bedtime  bisacodyl Suppository 10 milliGRAM(s) Rectal daily PRN  heparin   Injectable 5500 Unit(s) IV Push every 6 hours PRN  heparin   Injectable 2500 Unit(s) IV Push every 6 hours PRN  heparin  Infusion.  Unit(s)/Hr IV Continuous <Continuous>  levothyroxine 100 MICROGram(s) Oral daily  melatonin 3 milliGRAM(s) Oral at bedtime PRN  Nephro-salvatore 1 Tablet(s) Oral daily  ondansetron Injectable 4 milliGRAM(s) IV Push every 8 hours PRN  polyethylene glycol 3350 17 Gram(s) Oral daily  senna 2 Tablet(s) Oral at bedtime  sodium chloride 0.9%. 1000 milliLiter(s) IV Continuous <Continuous>      Physical Exam:  Vitals:  T(C): 37.2 (01-07-25 @ 04:51), Max: 37.6 (01-06-25 @ 20:38)  HR: 95 (01-07-25 @ 04:51) (95 - 101)  BP: 117/70 (01-07-25 @ 04:51) (117/70 - 122/77)  BP(mean): --  RR: 18 (01-07-25 @ 04:51) (17 - 18)  SpO2: 95% (01-07-25 @ 04:51) (95% - 96%)  Wt(kg): --  Daily     Daily   I&O's Summary      Appearance:  Normal, NAD  Eyes:  EOMI  HEENT: Dry oral muscosa, NC/AT  Neck:  No JVD  Respiratory: Clear to auscultation bilaterally  Cardiovascular: Normal S1 and S2 without murmurs, rubs or gallops  Abdomen:   BS hypoactive.  Softly distended, non-tender without appreciable organomegaly or masses  Extremities: 1/2 + BL leg edema        01-07    Complete Blood Count Repeat Every 24 Hours X 3 Days (01.07.25 @ 06:16)   Nucleated RBC: 0 /100 WBCs  WBC Count: 8.61 K/uL  RBC Count: 3.89 M/uL  Hemoglobin: 12.3 g/dL  Hematocrit: 36.5 %  Mean Cell Volume: 93.8 fl  Mean Cell Hemoglobin: 31.6 pg  Mean Cell Hemoglobin Conc: 33.7 g/dL  Red Cell Distrib Width: 16.4 %  Platelet Count - Automated: 258 K/uL    134[L]  |  105  |  11  ----------------------------<  108[H]  3.9   |  20[L]  |  0.78    Ca    7.8[L]      07 Jan 2025 06:16      PT/INR - ( 06 Jan 2025 06:47 )   PT: 14.0 sec;   INR: 1.22 ratio         PTT - ( 07 Jan 2025 06:16 )  PTT:70.3 sec            
Still with occasional sensation of wheezing.  No cough or dyspnea.     CXR yesterday with probable small left pleural effusion and/or atelectasis.    REVIEW OF SYSTEMS:  CARDIOVASCULAR: No chest pain, dyspnea or palpitations  All other review of systems is negative unless indicated above    Medications:  acetaminophen     Tablet .. 650 milliGRAM(s) Oral every 6 hours PRN  ALPRAZolam 0.5 milliGRAM(s) Oral every 12 hours  atorvastatin 20 milliGRAM(s) Oral at bedtime  bisacodyl Suppository 10 milliGRAM(s) Rectal daily PRN  heparin   Injectable 5500 Unit(s) IV Push every 6 hours PRN  heparin   Injectable 2500 Unit(s) IV Push every 6 hours PRN  heparin  Infusion.  Unit(s)/Hr IV Continuous <Continuous>  levothyroxine 100 MICROGram(s) Oral daily  melatonin 3 milliGRAM(s) Oral at bedtime PRN  Nephro-salvatore 1 Tablet(s) Oral daily  ondansetron Injectable 4 milliGRAM(s) IV Push every 8 hours PRN  polyethylene glycol 3350 17 Gram(s) Oral daily  senna 2 Tablet(s) Oral at bedtime  sodium chloride 0.9%. 1000 milliLiter(s) IV Continuous <Continuous>      Physical Exam:  Vitals:  T(C): 36.7 (01-08-25 @ 04:47), Max: 37.1 (01-07-25 @ 20:48)  HR: 92 (01-08-25 @ 04:47) (87 - 92)  BP: 106/66 (01-08-25 @ 04:47) (106/66 - 130/78)  BP(mean): --  RR: 18 (01-08-25 @ 04:47) (18 - 18)  SpO2: 94% (01-08-25 @ 04:47) (94% - 96%)  Wt(kg): --  Daily     Daily   I&O's Summary    07 Jan 2025 07:01  -  08 Jan 2025 07:00  --------------------------------------------------------  IN: 671 mL / OUT: 0 mL / NET: 671 mL      Appearance:  Normal, NAD  Eyes:  EOMI  HEENT: Dry oral muscosa, NC/AT  Neck:  No JVD  Respiratory: Clear to auscultation bilaterally  Cardiovascular: Normal S1 and S2 without murmurs, rubs or gallops  Abdomen:   Softly distended, mild and not increased compared to yesterday.  Minimal tenderness to palpation  Extremities: 1/2 + BL leg edema        01-08    136  |  104  |  10  ----------------------------<  86  3.9   |  20[L]  |  0.73    Ca    8.0[L]      08 Jan 2025 07:29      PTT - ( 08 Jan 2025 07:29 )  PTT:73.8 sec        
Ms. Oksana Durbin seen n room 452 [facility] at [facility] for Dr. Tim Gross  Lying flat in bed.  Denies shortness of breath or chest discomfort.  States she was hungry last night after paracentesis.  States she drinks about 3 cans of ginger ale a day, does not like water.  States she had a bowel movement yesterday.       Review Of Systems:  Constitutional: No Fever, Chills,  No Fatigue, Increase in abdominal girth and swelling of legs  HEENT: No Blurred vision, No Headache   Respiratory: No Cough, No sputum production, No Wheezing, No Shortness of breath  Cardiovascular: No Chest Pain, No Palpitations, No Lightheadedness, No Falling, No Syncope, No PEÑA, No PND, No Orthopnea, + Peripehral Edema  Gastrointestinal: No Abdominal Pain, No Diarrhea, No Constipation, No Nausea, No Vomiting, Normal Appetite   Genitourinary: No Dysuria  Extremities: + Swelling, No Claudication,   Neurologic:  No Focal deficit, No Weakness, No Dysphagia, No Paresthesias, No Syncope  Skin: No Rash,  No Ecchymoses, No Wounds, No Tenderness, No Drainage       Medications:  acetaminophen     Tablet .. 650 milliGRAM(s) Oral every 6 hours PRN  ALPRAZolam 0.5 milliGRAM(s) Oral every 12 hours  atorvastatin 20 milliGRAM(s) Oral at bedtime  bisacodyl Suppository 10 milliGRAM(s) Rectal daily PRN  heparin   Injectable 5500 Unit(s) IV Push every 6 hours PRN  heparin   Injectable 2500 Unit(s) IV Push every 6 hours PRN  heparin  Infusion.  Unit(s)/Hr IV Continuous <Continuous>  levothyroxine 100 MICROGram(s) Oral daily  melatonin 3 milliGRAM(s) Oral at bedtime PRN  Nephro-salvatore 1 Tablet(s) Oral daily  ondansetron Injectable 4 milliGRAM(s) IV Push every 8 hours PRN  polyethylene glycol 3350 17 Gram(s) Oral daily  senna 2 Tablet(s) Oral at bedtime  sodium chloride 0.9%. 1000 milliLiter(s) IV Continuous <Continuous>    PMH/PSH/FH/SH: Unchanged  Vitals:  T(C): 37.2 (01-03-25 @ 21:14), Max: 37.2 (01-03-25 @ 21:14)  HR: 103 (01-03-25 @ 21:14) (89 - 103)  BP: 128/74 (01-03-25 @ 21:14) (128/74 - 148/75)  BP(mean): --  RR: 18 (01-03-25 @ 21:14) (15 - 18)  SpO2: 96% (01-03-25 @ 21:14) (96% - 98%)  Wt(kg): --  Daily     Daily     Physical Exam:  Appearance:  Normal, NAD  Eyes: PERRL, EOMI  HENT:  Dry oral muscosa, NC/AT  Neck: without hepatojugular reflux, carotid 2+ equal without bruits  No Thyromegaly  Cardiovascular: normal regular S1, physiologic split S2,  1/6 systolic ejection murmur in aortic region   Respiratory: Clear to percussion and auscultation bilaterally  Gastrointestinal: Non-tender, distended-although not tense, significantly reduced compared to 1/1/25, very hypoactive BS+, No masses  Neurologic: Non-focal, No focal neurologic deficits  Skin: No rashes, No ecchymoses, No cyanosis, trace to 1+ bilateral lower extremity edema  Pulses-no palpable pulse in either lower extremity , both feet warm    01-03    131[L]  |  101  |  14  ----------------------------<  115[H]  3.8   |  18[L]  |  0.79    Ca    8.9      03 Jan 2025 06:02    TPro  7.4  /  Alb  2.7[L]  /  TBili  0.4  /  DBili  0.1  /  AST  45[H]  /  ALT  16  /  AlkPhos  81  01-03    PTT - ( 03 Jan 2025 23:38 )  PTT:73.6 sec      01-02 @ 07:01  -  01-03 @ 07:00  --------------------------------------------------------  IN: 780 mL / OUT: 0 mL / NET: 780 mL      
Ms. Oksana Durbin seen n room [address] at [facility] for Dr. Tim Gross  Lying flat in bed.  Denies shortness of breath or chest discomfort.  States she fels the fluid is returning to her abdomen.  States she is eating better although not well.   States no bowel movement yesterday.       Review Of Systems:  Constitutional: No Fever, Chills,  No Fatigue, Increase in abdominal girth and swelling of legs  HEENT: No Blurred vision, No Headache   Respiratory: No Cough, No sputum production, No Wheezing, No Shortness of breath  Cardiovascular: No Chest Pain, No Palpitations, No Lightheadedness, No Falling, No Syncope, No PEÑA, No PND, No Orthopnea, + Peripehral Edema  Gastrointestinal: No Abdominal Pain, No Diarrhea, + Constipation, No Nausea, No Vomiting, Normal Appetite   Genitourinary: No Dysuria  Extremities: + Swelling, No Claudication,   Neurologic:  No Focal deficit, No Weakness, No Dysphagia, No Paresthesias, No Syncope  Skin: No Rash,  No Ecchymoses, No Wounds, No Tenderness, No Drainage       Medications:  acetaminophen     Tablet .. 650 milliGRAM(s) Oral every 6 hours PRN  ALPRAZolam 0.5 milliGRAM(s) Oral every 12 hours  atorvastatin 20 milliGRAM(s) Oral at bedtime  bisacodyl Suppository 10 milliGRAM(s) Rectal daily PRN  heparin   Injectable 5500 Unit(s) IV Push every 6 hours PRN  heparin   Injectable 2500 Unit(s) IV Push every 6 hours PRN  heparin  Infusion.  Unit(s)/Hr IV Continuous <Continuous>  levothyroxine 100 MICROGram(s) Oral daily  melatonin 3 milliGRAM(s) Oral at bedtime PRN  Nephro-salvatore 1 Tablet(s) Oral daily  ondansetron Injectable 4 milliGRAM(s) IV Push every 8 hours PRN  polyethylene glycol 3350 17 Gram(s) Oral daily  senna 2 Tablet(s) Oral at bedtime  sodium chloride 0.9%. 1000 milliLiter(s) IV Continuous <Continuous>    PMH/PSH/FH/SH: Unchanged    Vitals:  T(C): 36.7 (01-05-25 @ 04:10), Max: 36.9 (01-04-25 @ 21:55)  HR: 94 (01-05-25 @ 04:10) (94 - 102)  BP: 146/76 (01-05-25 @ 04:10) (119/72 - 146/76)  BP(mean): --  RR: 17 (01-05-25 @ 04:10) (17 - 18)  SpO2: 96% (01-05-25 @ 04:10) (96% - 96%)  Wt(kg): --  Daily     Daily       Physical Exam:  Appearance:  Normal, NAD  Eyes: PERRL, EOMI  HENT:  Dry oral muscosa, NC/AT  Neck: without hepatojugular reflux, carotid 2+ equal without bruits  No Thyromegaly  Cardiovascular: normal regular S1, physiologic split S2,  1/6 systolic ejection murmur in aortic region   Respiratory: Clear to percussion and auscultation bilaterally  Gastrointestinal: Non-tender, distended-although more tense compared to 1/4/25, very hypoactive BS+, No masses  Neurologic: Non-focal, No focal neurologic deficits  Skin: No rashes, No ecchymoses, No cyanosis, trace to 1+ bilateral lower extremity edema  Pulses-no palpable pulse in either lower extremity , both feet warm    01-04    135  |  105  |  11  ----------------------------<  120[H]  3.7   |  20[L]  |  0.86    Ca    7.7[L]      04 Jan 2025 06:12      PTT - ( 04 Jan 2025 20:47 )  PTT:78.8 sec      
No c/o dyspnea.  Has small BM.    CTPA revealed small bilateral effusions, left greater than right.  Redemonstration of a posterior segmental filling defect in the   right lower lobe pulmonary artery. Additional segmental filling defect in the medial right middle lobe, and  subsegmental filling defect within a left upper lobe pulmonary artery. Vascular calcifications of aorta and coronary arteries.  No pericardial effusion.    TSH is elevated 17.4.  Patient had not taken levothyroxine for approximately 6 weeks prior to 11/12/24 when TSH was 55.  Dose was increased at that time.      REVIEW OF SYSTEMS:  CARDIOVASCULAR: No chest pain, dyspnea or palpitations  All other review of systems is negative unless indicated above    Medications:  acetaminophen     Tablet .. 650 milliGRAM(s) Oral every 6 hours PRN  ALPRAZolam 0.5 milliGRAM(s) Oral every 12 hours  atorvastatin 20 milliGRAM(s) Oral at bedtime  bisacodyl Suppository 10 milliGRAM(s) Rectal daily PRN  levothyroxine 75 MICROGram(s) Oral daily  melatonin 3 milliGRAM(s) Oral at bedtime PRN  Nephro-salvatore 1 Tablet(s) Oral daily  ondansetron Injectable 4 milliGRAM(s) IV Push every 8 hours PRN  polyethylene glycol 3350 17 Gram(s) Oral daily  senna 2 Tablet(s) Oral at bedtime  sodium chloride 0.9%. 1000 milliLiter(s) IV Continuous <Continuous>      Physical Exam:  Vitals:  T(C): 36.7 (01-03-25 @ 04:51), Max: 36.8 (01-02-25 @ 21:02)  HR: 91 (01-03-25 @ 04:51) (91 - 99)  BP: 129/80 (01-03-25 @ 04:51) (116/77 - 129/80)  BP(mean): --  RR: 18 (01-03-25 @ 04:51) (18 - 19)  SpO2: 95% (01-03-25 @ 04:51) (95% - 97%)  Wt(kg): --  Daily     Daily   I&O's Summary    02 Jan 2025 07:01  -  03 Jan 2025 07:00  --------------------------------------------------------  IN: 780 mL / OUT: 0 mL / NET: 780 mL        Appearance:  Normal, NAD  Eyes:  EOMI  HEENT: Dry oral muscosa, NC/AT  Neck:  No JVD  Respiratory: Clear to auscultation bilaterally  Cardiovascular: Normal S1 and S2 without murmurs, rubs or gallops  Abdomen:   BS hypoactive.  Softly distended, non-tender without appreciable organomegaly or masses  Extremities: 1 + BL leg edema        01-03      131[L]  |  101  |  14  ----------------------------<  115[H]  3.8   |  18[L]  |  0.79    Ca    8.9      03 Jan 2025 06:02      PTT - ( 03 Jan 2025 06:02 )  PTT:99.8 sec        
No c/o pain.  States noted some wheezing but no dyspnea.  Notices some increasing abdominal girth after having had paracentesis.    Remains on IV heparan for DVT and PE while cytology is pending in case additional invasive diagnostic procedure is needed.        REVIEW OF SYSTEMS:  CARDIOVASCULAR: No chest pain, dyspnea or palpitations  All other review of systems is negative unless indicated above    Medications:  acetaminophen     Tablet .. 650 milliGRAM(s) Oral every 6 hours PRN  ALPRAZolam 0.5 milliGRAM(s) Oral every 12 hours  atorvastatin 20 milliGRAM(s) Oral at bedtime  bisacodyl Suppository 10 milliGRAM(s) Rectal daily PRN  heparin   Injectable 5500 Unit(s) IV Push every 6 hours PRN  heparin   Injectable 2500 Unit(s) IV Push every 6 hours PRN  heparin  Infusion.  Unit(s)/Hr IV Continuous <Continuous>  levothyroxine 100 MICROGram(s) Oral daily  melatonin 3 milliGRAM(s) Oral at bedtime PRN  Nephro-salvatore 1 Tablet(s) Oral daily  ondansetron Injectable 4 milliGRAM(s) IV Push every 8 hours PRN  polyethylene glycol 3350 17 Gram(s) Oral daily  senna 2 Tablet(s) Oral at bedtime  sodium chloride 0.9%. 1000 milliLiter(s) IV Continuous <Continuous>      Physical Exam:  Vitals:  T(C): 36.8 (01-06-25 @ 04:29), Max: 37.1 (01-05-25 @ 20:41)  HR: 85 (01-06-25 @ 04:29) (85 - 99)  BP: 119/69 (01-06-25 @ 04:29) (104/68 - 126/76)  BP(mean): --  RR: 17 (01-06-25 @ 04:29) (17 - 18)  SpO2: 95% (01-06-25 @ 04:29) (95% - 96%)  Wt(kg): --  Daily     Daily   I&O's Summary    05 Jan 2025 07:01  -  06 Jan 2025 07:00  --------------------------------------------------------  IN: 1132 mL / OUT: 0 mL / NET: 1132 mL      Appearance:  Normal, NAD  Eyes:  EOMI  HEENT: Dry oral muscosa, NC/AT  Neck:  No JVD  Respiratory: Clear to auscultation bilaterally  Cardiovascular: Normal S1 and S2 without murmurs, rubs or gallops  Abdomen:   BS hypoactive.  Softly distended, non-tender without appreciable organomegaly or masses  Extremities: 1/2 + BL leg edema        01-06    Complete Blood Count in AM (01.06.25 @ 06:47)   Nucleated RBC: 0 /100 WBCs  WBC Count: 8.72 K/uL  RBC Count: 4.09 M/uL  Hemoglobin: 12.7 g/dL  Hematocrit: 38.7 %  Mean Cell Volume: 94.6 fl  Mean Cell Hemoglobin: 31.1 pg  Mean Cell Hemoglobin Conc: 32.8 g/dL  Red Cell Distrib Width: 16.5 %  Platelet Count - Automated: 280 K/uL    136  |  105  |  11  ----------------------------<  86  4.2   |  20[L]  |  0.74    Ca    8.2[L]      06 Jan 2025 06:47      PT/INR - ( 06 Jan 2025 06:47 )   PT: 14.0 sec;   INR: 1.22 ratio         PTT - ( 06 Jan 2025 06:47 )  PTT:67.0 sec

## 2025-01-09 NOTE — CHART NOTE - NSCHARTNOTEFT_GEN_A_CORE
Patient scheduled for paracentesis 1/8 however cancelled by primary team  Patient now being discharged today, no intervention necessary

## 2025-01-09 NOTE — PROGRESS NOTE ADULT - REASON FOR ADMISSION
abdominal pain and swelling

## 2025-01-13 ENCOUNTER — OUTPATIENT (OUTPATIENT)
Dept: OUTPATIENT SERVICES | Facility: HOSPITAL | Age: 89
LOS: 1 days | Discharge: HOME | End: 2025-01-13

## 2025-01-13 DIAGNOSIS — C78.6 SECONDARY MALIGNANT NEOPLASM OF RETROPERITONEUM AND PERITONEUM: ICD-10-CM

## 2025-01-13 DIAGNOSIS — C56.1 MALIGNANT NEOPLASM OF RIGHT OVARY: ICD-10-CM

## 2025-01-13 DIAGNOSIS — Z90.49 ACQUIRED ABSENCE OF OTHER SPECIFIED PARTS OF DIGESTIVE TRACT: Chronic | ICD-10-CM

## 2025-01-13 DIAGNOSIS — R18.0 MALIGNANT ASCITES: ICD-10-CM

## 2025-01-13 DIAGNOSIS — I26.99 OTHER PULMONARY EMBOLISM W/OUT ACUTE COR PULMONALE: ICD-10-CM

## 2025-01-13 DIAGNOSIS — C56.9 MALIGNANT NEOPLASM OF UNSPECIFIED OVARY: ICD-10-CM

## 2025-01-13 DIAGNOSIS — Z96.652 PRESENCE OF LEFT ARTIFICIAL KNEE JOINT: Chronic | ICD-10-CM

## 2025-01-14 ENCOUNTER — APPOINTMENT (OUTPATIENT)
Dept: HEMATOLOGY ONCOLOGY | Facility: CLINIC | Age: 89
End: 2025-01-14

## 2025-01-17 NOTE — PROCEDURE NOTE - NSPROCNAME_GEN_A_CORE
Outside faxed referral received from -[facility]    DX: [phone]     Referral placed in Audiologist mailbox for review.  
Paracentesis

## 2025-01-18 ENCOUNTER — INPATIENT (INPATIENT)
Facility: HOSPITAL | Age: 89
LOS: 8 days | Discharge: NURSING, REHAB OR LONG-TERM CARE | DRG: 374 | End: 2025-01-27
Attending: INTERNAL MEDICINE | Admitting: INTERNAL MEDICINE
Payer: MEDICARE

## 2025-01-18 VITALS
HEART RATE: 100 BPM | DIASTOLIC BLOOD PRESSURE: 79 MMHG | RESPIRATION RATE: 20 BRPM | HEIGHT: 60 IN | TEMPERATURE: 98 F | SYSTOLIC BLOOD PRESSURE: 112 MMHG | WEIGHT: 145.06 LBS | OXYGEN SATURATION: 97 %

## 2025-01-18 DIAGNOSIS — Z90.49 ACQUIRED ABSENCE OF OTHER SPECIFIED PARTS OF DIGESTIVE TRACT: Chronic | ICD-10-CM

## 2025-01-18 DIAGNOSIS — R53.1 WEAKNESS: ICD-10-CM

## 2025-01-18 DIAGNOSIS — Z96.652 PRESENCE OF LEFT ARTIFICIAL KNEE JOINT: Chronic | ICD-10-CM

## 2025-01-18 LAB
ALBUMIN SERPL ELPH-MCNC: 2.3 G/DL — LOW (ref 3.3–5)
ALP SERPL-CCNC: 115 U/L — SIGNIFICANT CHANGE UP (ref 40–120)
ALT FLD-CCNC: 15 U/L — SIGNIFICANT CHANGE UP (ref 10–45)
ANION GAP SERPL CALC-SCNC: 10 MMOL/L — SIGNIFICANT CHANGE UP (ref 5–17)
APTT BLD: 30.5 SEC — SIGNIFICANT CHANGE UP (ref 24.5–35.6)
AST SERPL-CCNC: 33 U/L — SIGNIFICANT CHANGE UP (ref 10–40)
BASOPHILS # BLD AUTO: 0.06 K/UL — SIGNIFICANT CHANGE UP (ref 0–0.2)
BASOPHILS NFR BLD AUTO: 0.7 % — SIGNIFICANT CHANGE UP (ref 0–2)
BILIRUB SERPL-MCNC: 0.7 MG/DL — SIGNIFICANT CHANGE UP (ref 0.2–1.2)
BUN SERPL-MCNC: 20 MG/DL — SIGNIFICANT CHANGE UP (ref 7–23)
CALCIUM SERPL-MCNC: 8.4 MG/DL — SIGNIFICANT CHANGE UP (ref 8.4–10.5)
CHLORIDE SERPL-SCNC: 104 MMOL/L — SIGNIFICANT CHANGE UP (ref 96–108)
CO2 SERPL-SCNC: 22 MMOL/L — SIGNIFICANT CHANGE UP (ref 22–31)
CREAT SERPL-MCNC: 0.92 MG/DL — SIGNIFICANT CHANGE UP (ref 0.5–1.3)
EGFR: 60 ML/MIN/1.73M2 — SIGNIFICANT CHANGE UP
EOSINOPHIL # BLD AUTO: 0.05 K/UL — SIGNIFICANT CHANGE UP (ref 0–0.5)
EOSINOPHIL NFR BLD AUTO: 0.6 % — SIGNIFICANT CHANGE UP (ref 0–6)
FLUAV AG NPH QL: SIGNIFICANT CHANGE UP
FLUBV AG NPH QL: SIGNIFICANT CHANGE UP
GLUCOSE SERPL-MCNC: 95 MG/DL — SIGNIFICANT CHANGE UP (ref 70–99)
HCT VFR BLD CALC: 43.3 % — SIGNIFICANT CHANGE UP (ref 34.5–45)
HGB BLD-MCNC: 14 G/DL — SIGNIFICANT CHANGE UP (ref 11.5–15.5)
IMM GRANULOCYTES NFR BLD AUTO: 0.7 % — SIGNIFICANT CHANGE UP (ref 0–0.9)
INR BLD: 1.65 RATIO — HIGH (ref 0.85–1.16)
LYMPHOCYTES # BLD AUTO: 2.13 K/UL — SIGNIFICANT CHANGE UP (ref 1–3.3)
LYMPHOCYTES # BLD AUTO: 23.5 % — SIGNIFICANT CHANGE UP (ref 13–44)
MAGNESIUM SERPL-MCNC: 2 MG/DL — SIGNIFICANT CHANGE UP (ref 1.6–2.6)
MCHC RBC-ENTMCNC: 30.4 PG — SIGNIFICANT CHANGE UP (ref 27–34)
MCHC RBC-ENTMCNC: 32.3 G/DL — SIGNIFICANT CHANGE UP (ref 32–36)
MCV RBC AUTO: 94.1 FL — SIGNIFICANT CHANGE UP (ref 80–100)
MONOCYTES # BLD AUTO: 0.57 K/UL — SIGNIFICANT CHANGE UP (ref 0–0.9)
MONOCYTES NFR BLD AUTO: 6.3 % — SIGNIFICANT CHANGE UP (ref 2–14)
NEUTROPHILS # BLD AUTO: 6.18 K/UL — SIGNIFICANT CHANGE UP (ref 1.8–7.4)
NEUTROPHILS NFR BLD AUTO: 68.2 % — SIGNIFICANT CHANGE UP (ref 43–77)
NRBC # BLD: 0 /100 WBCS — SIGNIFICANT CHANGE UP (ref 0–0)
NRBC BLD-RTO: 0 /100 WBCS — SIGNIFICANT CHANGE UP (ref 0–0)
PHOSPHATE SERPL-MCNC: 3.2 MG/DL — SIGNIFICANT CHANGE UP (ref 2.5–4.5)
PLATELET # BLD AUTO: 419 K/UL — HIGH (ref 150–400)
POTASSIUM SERPL-MCNC: 4.3 MMOL/L — SIGNIFICANT CHANGE UP (ref 3.5–5.3)
POTASSIUM SERPL-SCNC: 4.3 MMOL/L — SIGNIFICANT CHANGE UP (ref 3.5–5.3)
PROT SERPL-MCNC: 7.4 G/DL — SIGNIFICANT CHANGE UP (ref 6–8.3)
PROTHROM AB SERPL-ACNC: 18.7 SEC — HIGH (ref 9.9–13.4)
RBC # BLD: 4.6 M/UL — SIGNIFICANT CHANGE UP (ref 3.8–5.2)
RBC # FLD: 17.4 % — HIGH (ref 10.3–14.5)
RSV RNA NPH QL NAA+NON-PROBE: SIGNIFICANT CHANGE UP
SARS-COV-2 RNA SPEC QL NAA+PROBE: SIGNIFICANT CHANGE UP
SODIUM SERPL-SCNC: 136 MMOL/L — SIGNIFICANT CHANGE UP (ref 135–145)
WBC # BLD: 9.05 K/UL — SIGNIFICANT CHANGE UP (ref 3.8–10.5)
WBC # FLD AUTO: 9.05 K/UL — SIGNIFICANT CHANGE UP (ref 3.8–10.5)

## 2025-01-18 PROCEDURE — 70450 CT HEAD/BRAIN W/O DYE: CPT | Mod: 26

## 2025-01-18 PROCEDURE — 99223 1ST HOSP IP/OBS HIGH 75: CPT | Mod: 25

## 2025-01-18 PROCEDURE — 99285 EMERGENCY DEPT VISIT HI MDM: CPT

## 2025-01-18 PROCEDURE — 72170 X-RAY EXAM OF PELVIS: CPT | Mod: 26

## 2025-01-18 PROCEDURE — 99497 ADVNCD CARE PLAN 30 MIN: CPT | Mod: 25

## 2025-01-18 PROCEDURE — 71045 X-RAY EXAM CHEST 1 VIEW: CPT | Mod: 26

## 2025-01-18 RX ORDER — ACETAMINOPHEN 160 MG/5ML
650 SUSPENSION ORAL EVERY 6 HOURS
Refills: 0 | Status: DISCONTINUED | OUTPATIENT
Start: 2025-01-18 | End: 2025-01-27

## 2025-01-18 RX ORDER — APIXABAN 5 MG/1
5 TABLET, FILM COATED ORAL EVERY 12 HOURS
Refills: 0 | Status: DISCONTINUED | OUTPATIENT
Start: 2025-01-18 | End: 2025-01-21

## 2025-01-18 RX ORDER — ATORVASTATIN CALCIUM 80 MG/1
20 TABLET, FILM COATED ORAL AT BEDTIME
Refills: 0 | Status: DISCONTINUED | OUTPATIENT
Start: 2025-01-18 | End: 2025-01-27

## 2025-01-18 RX ORDER — ACETAMINOPHEN, DIPHENHYDRAMINE HCL, PHENYLEPHRINE HCL 325; 25; 5 MG/1; MG/1; MG/1
3 TABLET ORAL AT BEDTIME
Refills: 0 | Status: DISCONTINUED | OUTPATIENT
Start: 2025-01-18 | End: 2025-01-27

## 2025-01-18 RX ADMIN — APIXABAN 5 MILLIGRAM(S): 5 TABLET, FILM COATED ORAL at 21:33

## 2025-01-18 RX ADMIN — ATORVASTATIN CALCIUM 20 MILLIGRAM(S): 80 TABLET, FILM COATED ORAL at 21:33

## 2025-01-18 NOTE — ED PROVIDER NOTE - ATTENDING CONTRIBUTION TO CARE
ECG directly visualized by me and shows sinus rhythm, short AK interval, rate 82, , QRS 76, QTc 439.  No ST elevations or depressions.  Overall low voltage. Emergency Medicine Attending MD Bravo:  patient seen and evaluated with the resident.  I was present for key portions of the History & Physical, and I agree with the Impression & Plan.    Patient is a 89-year-old female brought in by daughter for evaluation of frequent falls.  Patient's had 3 falls in the last week.  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  Patient was diagnosed when she presented with symptomatic ascites in early January.  Had a large-volume paracentesis and CT scan that were concerning for malignancy.  Since the paracentesis patient has been discharged home but has slowly progressively developed recurrent abdominal distention.  She denies fever chills or abdominal pain just that she has gained significant weight.    Daughter is concerned about the functional status and recurrent ascites.  Feels that she needs to be admitted for PT, chemo initiation, repeat paracentesis.    VS: wnl  Gen: Well appearing elderly female, in NAD  Head: NC/AT  Neck: trachea midline  Resp:  No distress  CV: RRR, no RMG  Abd: Soft, distended, nontender to palpation.  Ext: no deformities, no osseous tenderness of the extremities  Neuro:  A&Ox4 appears non focal  Skin: Multiple ecchymoses on bilateral arms.  Psych: appropriate    Medical Decision Making / Differential Diagnosis:  HPI concerning for frequent falls, likely due to deconditioning.  Patient will need CT head, chest x-ray, pelvis x-ray.    Plan: Anticipate admission, oncology consult as an inpatient, possible repeat paracentesis    ECG directly visualized by me and shows sinus rhythm, short TX interval, rate 82, , QRS 76, QTc 439.  No ST elevations or depressions.  Overall low voltage.

## 2025-01-18 NOTE — ED PROVIDER NOTE - PHYSICAL EXAMINATION
VS: wnl  Gen: Well appearing elderly female, in NAD  Head: NC/AT  Neck: trachea midline  Resp:  No distress  CV: RRR, no RMG  Abd: Soft, distended, nontender to palpation.  Ext: no deformities, no osseous tenderness of the extremities  Neuro:  A&Ox4 appears non focal  Skin: Multiple ecchymoses on bilateral arms.  Psych: appropriate

## 2025-01-18 NOTE — H&P ADULT - PROBLEM SELECTOR PLAN 3
- Consult IR to assess for possible paracentesis in setting of ascites 2/2 to malignancy  - Can give lasix pending evaluation by IR

## 2025-01-18 NOTE — H&P ADULT - PROBLEM SELECTOR PROBLEM 4
Carcinomatosis Bed/Stretcher in lowest position, wheels locked, appropriate side rails in place/Call bell, personal items and telephone in reach/Instruct patient to call for assistance before getting out of bed/chair/stretcher/Non-slip footwear applied when patient is off stretcher/[place] to call system/Physically safe environment - no spills, clutter or unnecessary equipment/Purposeful proactive rounding/Room/bathroom lighting operational, light cord in reach Bed/Stretcher in lowest position, wheels locked, appropriate side rails in place/Call bell, personal items and telephone in reach/Instruct patient to call for assistance before getting out of bed/chair/stretcher/Non-slip footwear applied when patient is off stretcher/[place] to call system/Physically safe environment - no spills, clutter or unnecessary equipment/Purposeful proactive rounding/Room/bathroom lighting operational, light cord in reach

## 2025-01-18 NOTE — ED PROVIDER NOTE - CLINICAL SUMMARY MEDICAL DECISION MAKING FREE TEXT BOX
89-year-old female past medical history of osteoporosis, hypothyroidism, hyperlipidemia,, right lower lung PE, right femoral vein DVT (on Eliquis), status post paracentesis with IR January 30, 2025 (6.8 L drained), peritoneal fluid cytology results positive for malignant cells, adenocarcinoma (patient being followed by Gyn/Onc who recommends neoadjuvant therapy, not candidate for anticoagulation) presents to the ED status post fall x 2 days ago, 3 recent falls in total last week, worsening weakness. no recent illness. PE: well appearing a0x3, lungs cta, cardiac regular rhythm, cn 2-12 grossly intact, echymosis in various stages of healing upper ext, non tender, echymosis L sacral, non tender. will get CTH, will get screening cxr, pelvis, labs, will require admission.

## 2025-01-18 NOTE — H&P ADULT - NSHPPHYSICALEXAM_GEN_ALL_CORE
T(C): 36.5 (01-19-25 @ 04:58), Max: 36.8 (01-18-25 @ 16:56)  HR: 77 (01-19-25 @ 04:58) (77 - 100)  BP: 120/75 (01-19-25 @ 04:58) (109/73 - 144/68)  RR: 18 (01-19-25 @ 04:58) (17 - 20)  SpO2: 92% (01-19-25 @ 04:58) (92% - 98%)    CONSTITUTIONAL: No apparent distress  EYES: PERRLA and symmetric, EOMI, No conjunctival or scleral injection, non-icteric  ENMT: Oral mucosa with moist membranes.  NECK: Supple, symmetric. No JVD.  RESP: No respiratory distress, no use of accessory muscles; CTA b/l, no WRR  CV: RRR, +S1S2, no MRG  GI: Soft, NT, no rebound, no guarding. Moderately distended.  : No suprapubic tenderness. No CVA tenderness.  LYMPH: No cervical LAD or tenderness  MSK: No spinal tenderness, normal muscle strength/tone  EXTREMITIES: 1+ b/l pedal edema  SKIN: No rashes or ulcers noted  NEURO: A+Ox3, responsive. No tremor  PSYCH: Appropriate insight/judgment; mood and affect appropriate

## 2025-01-18 NOTE — PATIENT PROFILE ADULT - FALL HARM RISK - HARM RISK INTERVENTIONS

## 2025-01-18 NOTE — ED ADULT NURSE NOTE - NSFALLRISKINTERV_ED_ALL_ED

## 2025-01-18 NOTE — ED ADULT TRIAGE NOTE - CHIEF COMPLAINT QUOTE
Fall 3 days ago, on Eliquis with +head strike. C/O back pain. Also reporting increased fluid retention in stomach with recent cancer diagnosis. Reports she needed the fluid drained during her admission earlier this month.

## 2025-01-18 NOTE — H&P ADULT - HISTORY OF PRESENT ILLNESS
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 She believes she is deconditioned from her prolonged hospital stay recently where she was diagnosed with malignancy, PE, and had paracentesis for ascites. She denies any major source of pain, and also denies headstrike or LOC. She has not yet followed up outpatient because she was too weak to go to her appointment with gyn/onc. Reports abdominal and LE swelling have worsened since discharge, but are not as bad as previous hospitalization. Patient denies fever, chills, chest pain, SOB, headache, dizziness, abd pain, nausea, vomiting.

## 2025-01-18 NOTE — H&P ADULT - TIME BILLING
Independently obtaining a history, performing a physical examination, discussing the plan with the patient, ordering medications/tests, documenting clinical information, and coordinating care.    In addition to time of total encounter, spent time discussing goals of care as recorded in documented note above.  79 minutes spent on total encounter which excludes time spent discussing goc.

## 2025-01-18 NOTE — H&P ADULT - ASSESSMENT
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

## 2025-01-18 NOTE — H&P ADULT - PROBLEM SELECTOR PLAN 2
Patient with long, recent hospital stay and expressing weakness since  - Patient expressing desire for rehab and PT  - PT

## 2025-01-18 NOTE — H&P ADULT - CONVERSATION DETAILS
Discussed GOC in setting of patient's age, comorbidities, and functional status. Discussed with patient, who has demonstrated capacity and is A&Ox3 at this time. Patient wants to continue to live on for her family and get healthier. She would like to remain FULL CODE at this time. She will pursue chemotherapy as needed and directed by oncology.

## 2025-01-18 NOTE — ED ADULT NURSE NOTE - OBJECTIVE STATEMENT
89 y/o female came to the ED with complaints of multiple falls the past few days and abdominal pains. Bruising to the back of her head and left sacrum, and multiple bruising on her arms. Recent paracentesis and diagnosed with adenocarcinoma. Denies N, V, D, CP, fevers, chills, headache, dizziness, vision changes, numbness, tingling, weakness.

## 2025-01-18 NOTE — H&P ADULT - PROBLEM SELECTOR PLAN 4
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

## 2025-01-18 NOTE — ED PROVIDER NOTE - OBJECTIVE STATEMENT
89-year-old female past medical history of osteoporosis, hypothyroidism, hyperlipidemia,, right lower lung PE, right femoral vein DVT (on Eliquis), status post paracentesis with IR January 30, 2025 (6.8 L drained), peritoneal fluid cytology results positive for malignant cells, adenocarcinoma (patient being followed by Gyn/Onc who recommends neoadjuvant therapy, not candidate for anticoagulation) presents to the ED status post fall x 2 days ago, worsening abdominal pain. See MDM

## 2025-01-19 DIAGNOSIS — E03.9 HYPOTHYROIDISM, UNSPECIFIED: ICD-10-CM

## 2025-01-19 DIAGNOSIS — C80.0 DISSEMINATED MALIGNANT NEOPLASM, UNSPECIFIED: ICD-10-CM

## 2025-01-19 DIAGNOSIS — R09.89 OTHER SPECIFIED SYMPTOMS AND SIGNS INVOLVING THE CIRCULATORY AND RESPIRATORY SYSTEMS: ICD-10-CM

## 2025-01-19 DIAGNOSIS — R18.8 OTHER ASCITES: ICD-10-CM

## 2025-01-19 DIAGNOSIS — E78.5 HYPERLIPIDEMIA, UNSPECIFIED: ICD-10-CM

## 2025-01-19 DIAGNOSIS — F41.9 ANXIETY DISORDER, UNSPECIFIED: ICD-10-CM

## 2025-01-19 DIAGNOSIS — R53.81 OTHER MALAISE: ICD-10-CM

## 2025-01-19 DIAGNOSIS — Z86.711 PERSONAL HISTORY OF PULMONARY EMBOLISM: ICD-10-CM
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
## 2025-01-19 LAB
A1C WITH ESTIMATED AVERAGE GLUCOSE RESULT: 5.5 % — SIGNIFICANT CHANGE UP (ref 4–5.6)
ALBUMIN SERPL ELPH-MCNC: 2.1 G/DL — LOW (ref 3.3–5)
ALP SERPL-CCNC: 105 U/L — SIGNIFICANT CHANGE UP (ref 40–120)
ALT FLD-CCNC: 9 U/L — LOW (ref 10–45)
ANION GAP SERPL CALC-SCNC: 9 MMOL/L — SIGNIFICANT CHANGE UP (ref 5–17)
AST SERPL-CCNC: 20 U/L — SIGNIFICANT CHANGE UP (ref 10–40)
BILIRUB SERPL-MCNC: 0.6 MG/DL — SIGNIFICANT CHANGE UP (ref 0.2–1.2)
BUN SERPL-MCNC: 19 MG/DL — SIGNIFICANT CHANGE UP (ref 7–23)
CALCIUM SERPL-MCNC: 8.5 MG/DL — SIGNIFICANT CHANGE UP (ref 8.4–10.5)
CHLORIDE SERPL-SCNC: 108 MMOL/L — SIGNIFICANT CHANGE UP (ref 96–108)
CHOLEST SERPL-MCNC: 92 MG/DL — SIGNIFICANT CHANGE UP
CO2 SERPL-SCNC: 23 MMOL/L — SIGNIFICANT CHANGE UP (ref 22–31)
CREAT SERPL-MCNC: 0.93 MG/DL — SIGNIFICANT CHANGE UP (ref 0.5–1.3)
EGFR: 59 ML/MIN/1.73M2 — LOW
ESTIMATED AVERAGE GLUCOSE: 111 MG/DL — SIGNIFICANT CHANGE UP (ref 68–114)
GLUCOSE SERPL-MCNC: 85 MG/DL — SIGNIFICANT CHANGE UP (ref 70–99)
HCT VFR BLD CALC: 40.3 % — SIGNIFICANT CHANGE UP (ref 34.5–45)
HDLC SERPL-MCNC: 40 MG/DL — LOW
HGB BLD-MCNC: 12.9 G/DL — SIGNIFICANT CHANGE UP (ref 11.5–15.5)
LIPID PNL WITH DIRECT LDL SERPL: 36 MG/DL — SIGNIFICANT CHANGE UP
MCHC RBC-ENTMCNC: 30.8 PG — SIGNIFICANT CHANGE UP (ref 27–34)
MCHC RBC-ENTMCNC: 32 G/DL — SIGNIFICANT CHANGE UP (ref 32–36)
MCV RBC AUTO: 96.2 FL — SIGNIFICANT CHANGE UP (ref 80–100)
NON HDL CHOLESTEROL: 52 MG/DL — SIGNIFICANT CHANGE UP
NRBC # BLD: 0 /100 WBCS — SIGNIFICANT CHANGE UP (ref 0–0)
NRBC BLD-RTO: 0 /100 WBCS — SIGNIFICANT CHANGE UP (ref 0–0)
PLATELET # BLD AUTO: 347 K/UL — SIGNIFICANT CHANGE UP (ref 150–400)
POTASSIUM SERPL-MCNC: 4.9 MMOL/L — SIGNIFICANT CHANGE UP (ref 3.5–5.3)
POTASSIUM SERPL-SCNC: 4.9 MMOL/L — SIGNIFICANT CHANGE UP (ref 3.5–5.3)
PROT SERPL-MCNC: 6.3 G/DL — SIGNIFICANT CHANGE UP (ref 6–8.3)
RBC # BLD: 4.19 M/UL — SIGNIFICANT CHANGE UP (ref 3.8–5.2)
RBC # FLD: 17.7 % — HIGH (ref 10.3–14.5)
SODIUM SERPL-SCNC: 140 MMOL/L — SIGNIFICANT CHANGE UP (ref 135–145)
TRIGL SERPL-MCNC: 82 MG/DL — SIGNIFICANT CHANGE UP
WBC # BLD: 7.28 K/UL — SIGNIFICANT CHANGE UP (ref 3.8–10.5)
WBC # FLD AUTO: 7.28 K/UL — SIGNIFICANT CHANGE UP (ref 3.8–10.5)

## 2025-01-19 PROCEDURE — 93970 EXTREMITY STUDY: CPT | Mod: 26

## 2025-01-19 RX ORDER — ACETAMINOPHEN 80 MG/.8ML
2 SOLUTION/ DROPS ORAL
Refills: 0 | DISCHARGE

## 2025-01-19 RX ORDER — PANTOPRAZOLE 40 MG/1
1 TABLET, DELAYED RELEASE ORAL
Refills: 0 | DISCHARGE

## 2025-01-19 RX ORDER — ASPIRIN 81 MG
1 TABLET, DELAYED RELEASE (ENTERIC COATED) ORAL
Refills: 0 | DISCHARGE

## 2025-01-19 RX ORDER — POLYSORBATE 80 100 MG/10ML
1 SOLUTION/ DROPS OPHTHALMIC
Refills: 0 | DISCHARGE

## 2025-01-19 RX ORDER — ONDANSETRON 4 MG/1
1 TABLET ORAL
Refills: 0 | DISCHARGE

## 2025-01-19 RX ORDER — LEVOTHYROXINE SODIUM 25 UG/1
100 TABLET ORAL DAILY
Refills: 0 | Status: DISCONTINUED | OUTPATIENT
Start: 2025-01-19 | End: 2025-01-21

## 2025-01-19 RX ORDER — ALPRAZOLAM 2 MG
0.5 TABLET ORAL
Refills: 0 | Status: DISCONTINUED | OUTPATIENT
Start: 2025-01-19 | End: 2025-01-26

## 2025-01-19 RX ADMIN — APIXABAN 5 MILLIGRAM(S): 5 TABLET, FILM COATED ORAL at 17:20

## 2025-01-19 RX ADMIN — Medication 0.5 MILLIGRAM(S): at 05:51

## 2025-01-19 RX ADMIN — ATORVASTATIN CALCIUM 20 MILLIGRAM(S): 80 TABLET, FILM COATED ORAL at 21:00

## 2025-01-19 RX ADMIN — Medication 0.5 MILLIGRAM(S): at 20:59

## 2025-01-19 RX ADMIN — ACETAMINOPHEN, DIPHENHYDRAMINE HCL, PHENYLEPHRINE HCL 3 MILLIGRAM(S): 325; 25; 5 TABLET ORAL at 20:59

## 2025-01-19 RX ADMIN — LEVOTHYROXINE SODIUM 100 MICROGRAM(S): 25 TABLET ORAL at 05:45

## 2025-01-19 RX ADMIN — APIXABAN 5 MILLIGRAM(S): 5 TABLET, FILM COATED ORAL at 05:46

## 2025-01-19 NOTE — PHYSICAL THERAPY INITIAL EVALUATION ADULT - PERTINENT HX OF CURRENT PROBLEM, REHAB EVAL
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 She believes she is deconditioned from her prolonged hospital stay recently where she was diagnosed with malignancy, PE, and had paracentesis for ascites. She denies any major source of pain, and also denies head-strike or LOC. She has not yet followed up outpatient because she was too weak to go to her appointment with gyn/onc. Reports abdominal and LE swelling have worsened since discharge, but are not as bad as previous hospitalization. Patient denies fever, chills, chest pain, SOB, headache, dizziness, abd pain, nausea, vomiting. Hospital course: (1/18) CT Head: No evidence of acute hemorrhage or mass effect. (1/18) X-ray Pelvis: No acute displaced fracture visualized. (1/18) CXR: No focal consolidation. Plan for image-guided therapeutic paracentesis on Tuesday 1/21

## 2025-01-19 NOTE — PHYSICAL THERAPY INITIAL EVALUATION ADULT - GAIT TRAINING, PT EVAL
GOAL: Patient will ambulate 200 feet independently with RW in 2 weeks in order to return home safely.

## 2025-01-19 NOTE — CONSULT NOTE ADULT - SUBJECTIVE AND OBJECTIVE BOX
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 She believes she is deconditioned from her prolonged hospital stay recently where she was diagnosed with malignancy, PE, and had paracentesis for ascites. She denies any major source of pain, and also denies headstrike or LOC. She has not yet followed up outpatient because she was too weak to go to her appointment with gyn/onc. Reports abdominal and LE swelling have worsened since discharge, but are not as bad as previous hospitalization. Patient denies fever, chills, chest pain, SOB, headache, dizziness, abd pain, nausea, vomiting.      PMH:     Hypothyroidism    HLD (hyperlipidemia)    asthmatic bronchitis    one episode of syncope likely vagal    carotid atherosclerosis    Right rotator cuff tear treated conservatively    Sciatica and lumbar spondylosis treated conservatively 2018    Diverticulosis    Chronic oral lichen planus    Osteoarthritis knees    Recurrent UTIs        PSH:     Cholecystectomy     Tubal ligation    BL cataracts 2012    R total knee replacement 3/2024    L total knee replacement 2024          Medications:   acetaminophen     Tablet .. 650 milliGRAM(s) Oral every 6 hours PRN  ALPRAZolam 0.5 milliGRAM(s) Oral two times a day PRN  apixaban 5 milliGRAM(s) Oral every 12 hours  atorvastatin 20 milliGRAM(s) Oral at bedtime  levothyroxine 100 MICROGram(s) Oral daily  melatonin 3 milliGRAM(s) Oral at bedtime PRN    Outpatient medication:    Pravastatin 80 mg qd  Levothyroxine 88 mcg qd  ASA 81 mg qd  Alprazolam 0.25 mg bid  Estradiol vaginal cream     Allergies:  No Known Allergies    FAMILY HISTORY:  Father - tuberculosis  Mother - Alzheimer's  Brother - MI age 38  Brother - DM, HTN and CAD S/P stent  Sister - atrial fibrillation  Daughter - SLE  Son - HTN and lipid abnormality    Social History:  Smoking:  Former  REVIEW OF SYSTEMS:  CONSTITUTIONAL: No weakness, fevers or chills  EYES/ENT: No visual changes;  No vertigo or throat pain   NECK: No pain or stiffness  RESPIRATORY: No cough, wheezing, hemoptysis; No shortness of breath  CARDIOVASCULAR: No chest pain or palpitations  GASTROINTESTINAL: No abdominal or epigastric pain. No nausea, vomiting, or hematemesis; No diarrhea or constipation. No melena or hematochezia.  GENITOURINARY: No dysuria, frequency or hematuria  NEUROLOGICAL: No numbness or weakness  SKIN: No itching, burning, rashes, or lesions   All other review of systems is negative unless indicated above    Physical Exam:  T(C): 36.5 (25 @ 04:58), Max: 36.8 (25 @ 16:56)  HR: 77 (25 @ 04:58) (77 - 100)  BP: 120/75 (25 @ 04:58) (109/73 - 144/68)  RR: 18 (25 @ 04:58) (17 - 20)  SpO2: 92% (25 @ 04:58) (92% - 98%)  Wt(kg): --    Daily Height in cm: 152.4 (2025 12:05)    Daily     Appearance:  Normal, NAD  Eyes:  PERRL, EOMI  HEENT: Normal oral [place], NC/AT  Neck:  No JVD or HJR  Respiratory: Clear to auscultation bilaterally  Cardiovascular: Normal S1 and S2 without murmurs, rubs or gallops  Abdomen:   BS normal, Soft,  Non-tender without organomegally or masses  Extremities: Without edema, pulses are full      Labs:                        12.9   7.28  )-----------( 347      ( 2025 07:03 )             40.3         140  |  108  |  19  ----------------------------<  85  4.9   |  23  |  0.93    Ca    8.5      2025 07:02  Phos  3.2       Mg     2.0         TPro  6.3  /  Alb  2.1[L]  /  TBili  0.6  /  DBili  x   /  AST  20  /  ALT  9[L]  /  AlkPhos  105      PT/INR - ( 2025 13:49 )   PT: 18.7 sec;   INR: 1.65 ratio         PTT - ( 2025 13:49 )  PTT:30.5 sec        Total Cholesterol: 92  LDL: --  HDL: 40  T            ECG:    Echo:    Stress Testing:    Cath:    Interpretation of Telemetry:    Imaging:   FULL NOTE TO FOLLOW      Patient seen and evaluated @ bedside  Chief Complaint: Lethargy, falls    HPI:  89F w/ Hx of recently diagnosed Mullerian carcinoma (not yet on chemo) and pulmonary embolism/R femoral DVT (on Eliquis), ascites s/p paracentesis, osteoporosis, hypothyroidism, HLD, presents s/p 3 falls in the last week, 2 of which she needed help getting back up from. She did get phuysical therapy at home and used walker for short distances.  At last admission wanted to go home with daughter and subsequent home services for out patient treatment to start neoadjuvant chemo.  She believes she is deconditioned from her prolonged hospital stay recently where she was diagnosed with malignancy and prior to admission had been at home receiving PT S/P R TKR.  DVT and PE noted last admission, and had therapeutic and diagnostic. paracentesis for ascites. She denies any major source of pain, and also denies headstrike or LOC. She has not yet followed up outpatient because she was too weak to go to her appointment with oncology. Reports abdominal and LE swelling have worsened since discharge, but are not as bad as previous hospitalization. Patient denies fever, chills, chest pain, SOB, headache, dizziness, abd pain, nausea, vomiting.    Abdominal girth significantly increased from time of discharge but not aa tense as when admitted previously.    Edema is significantly more compared to time of lawst discharge.    Last admission TSH elevated patient having missed medication a few months ago and then restarted on higher dose.    ALbumin now 2.1 having been 3.2 at time of ER admission 24.      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 650 milliGRAM(s) Oral every 6 hours PRN  ALPRAZolam 0.5 milliGRAM(s) Oral two times a day PRN  apixaban 5 milliGRAM(s) Oral every 12 hours  atorvastatin 20 milliGRAM(s) Oral at bedtime  levothyroxine 100 MICROGram(s) Oral daily  melatonin 3 milliGRAM(s) Oral at bedtime PRN    Outpatient medication:    Pravastatin 80 mg qd  Levothyroxine 88 mcg qd  ASA 81 mg qd  Alprazolam 0.25 mg bid  Estradiol vaginal cream     Allergies:  No Known Allergies    FAMILY HISTORY:  Father - tuberculosis  Mother - Alzheimer's  Brother - MI age 38  Brother - DM, HTN and CAD S/P stent  Sister - atrial fibrillation  Daughter - SLE  Son - HTN and lipid abnormality    Social History:  Smoking:  Former  REVIEW OF SYSTEMS:  RESPIRATORY: No cough  CARDIOVASCULAR: No chest pain or palpitations. + dypsnea  All other review of systems is negative unless indicated above    Physical Exam:  T(C): 36.5 (25 @ 04:58), Max: 36.8 (25 @ 16:56)  HR: 77 (25 @ 04:58) (77 - 100)  BP: 120/75 (25 @ 04:58) (109/73 - 144/68)  RR: 18 (25 @ 04:58) (17 - 20)  SpO2: 92% (25 @ 04:58) (92% - 98%)  Wt(kg): --    Daily Height in cm: 152.4 (2025 12:05)    Daily     Appearance:  Normal, NAD  Eyes:  EOMI  HEENT: Normal oral mucosa NC/AT  Neck:  No JVD  Respiratory: Clear to auscultation bilaterally  Cardiovascular: Normal S1 and S2 without murmurs, rubs or gallops  Abdomen:    Softly distended,  Non-tender  Extremities: 1 1/2 + BL leg edema      Labs:                        12.9   7.28  )-----------( 347      ( 2025 07:03 )             40.3         140  |  108  |  19  ----------------------------<  85  4.9   |  23  |  0.93    Ca    8.5      2025 07:02  Phos  3.2       Mg     2.0         TPro  6.3  /  Alb  2.1[L]  /  TBili  0.6  /  DBili  x   /  AST  20  /  ALT  9[L]  /  AlkPhos  105  01-19    PT/INR - ( 2025 13:49 )   PT: 18.7 sec;   INR: 1.65 ratio         PTT - ( 2025 13:49 )  PTT:30.5 sec        Total Cholesterol: 92  LDL: --  HDL: 40  T            ECG:  NSR.  NOrmal axis and interval.  Non-specific T wave abnormality.    CXR: Elevated right hemidiaphragm (old).  No pleural effusion.  No infiltrate.

## 2025-01-19 NOTE — PHYSICAL THERAPY INITIAL EVALUATION ADULT - ADDITIONAL COMMENTS
Pt lives alone in a private house, 2 steps to enter, 1 flight to bedroom/bathroom. Pt was able to ambulate household distances prior to admission without AD and uses RW occasionally . Pt has had 3 falls with inability to get up since dec (L knee replacement). Her daughter is staying with her at this time but lives in [place] and will be returning home.

## 2025-01-19 NOTE — CONSULT NOTE ADULT - ASSESSMENT
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                       Lethargy related to malignancy and deconditioning.                        Edema could be more due to hypoalbuminemia however R/O progression of know right DVT, R/O new left DVT.                       Episodic dyspnea likely multifactorial.    Plan:    JAMAAL OQUENDO venous duplex ordered.               Therapeutic paracentesis.  Can find out from IR how many doses of Eliquis to hold prior to procedure.  Depending on answer, may need to bridge with heparin.                Physical therapy and care management as patient will need rehab and oncology visit and chemo initiation while there if progress at rehab not sufficient in time to be home for urgent initiation of treatment of her tumor.

## 2025-01-19 NOTE — CONSULT NOTE ADULT - ASSESSMENT
Interventional Radiology    Evaluate for Procedure: therapeutic paracentesis    HPI: 89 yo female with peritoneal carinomatosis presenting with recurrent ascites. Last para 1/3, drained 6.8L.     Allergies: No Known Allergies    Medications (Abx/Cardiac/Anticoagulation/Blood Products)    apixaban: 5 milliGRAM(s) Oral (01-19 @ 05:46)    Data:  152.4  65.8  T(C): 36.5  HR: 77  BP: 120/75  RR: 18  SpO2: 92%    -WBC 9.05 / HgB 14.0 / Hct 43.3 / Plt 419  -Na 136 / Cl 104 / BUN 20 / Glucose 95  -K 4.3 / CO2 22 / Cr 0.92  -ALT 15 / Alk Phos 115 / T.Bili 0.7  -INR 1.65 / PTT 30.5      Radiology: Ultrasound abdomen reviewed    Assessment/Plan:   89 yo female with peritoneal carinomatosis presenting with recurrent ascites. Last para 1/3, drained 6.8L.   -- IR will plan to perform image-guided therapeutic paracentesis on Tuesday 1/21  -- no need for NPO  -- please place IR procedure request order under Dr. Kapadia    --  Kody Ortiz DO/CINTHIA/JOSE A, PGY-6 Chief Resident  Vascular and Interventional Radiology   Available on [employer] Teams    - Non-emergent consults: Place IR consult order in [place]  - Emergent issues (pager): [facility] [phone]; [facility] [phone]; [phone]  - Scheduling questions: [facility] [phone]; [facility] [phone]  - Clinic/outpatient booking: [facility] [phone]; [facility] [phone]

## 2025-01-20 LAB
T4 FREE SERPL-MCNC: 1.2 NG/DL — SIGNIFICANT CHANGE UP (ref 0.9–1.7)
TSH SERPL-MCNC: 17.4 UIU/ML — HIGH (ref 0.27–4.2)

## 2025-01-20 RX ADMIN — ACETAMINOPHEN 650 MILLIGRAM(S): 160 SUSPENSION ORAL at 21:03

## 2025-01-20 RX ADMIN — Medication 0.5 MILLIGRAM(S): at 21:03

## 2025-01-20 RX ADMIN — ACETAMINOPHEN 650 MILLIGRAM(S): 160 SUSPENSION ORAL at 22:03

## 2025-01-20 RX ADMIN — ATORVASTATIN CALCIUM 20 MILLIGRAM(S): 80 TABLET, FILM COATED ORAL at 21:03

## 2025-01-20 RX ADMIN — APIXABAN 5 MILLIGRAM(S): 5 TABLET, FILM COATED ORAL at 06:22

## 2025-01-20 RX ADMIN — LEVOTHYROXINE SODIUM 100 MICROGRAM(S): 25 TABLET ORAL at 06:22

## 2025-01-20 NOTE — DIETITIAN INITIAL EVALUATION ADULT - PERTINENT MEDS FT
MEDICATIONS  (STANDING):  apixaban 5 milliGRAM(s) Oral every 12 hours  atorvastatin 20 milliGRAM(s) Oral at bedtime  levothyroxine 100 MICROGram(s) Oral daily    MEDICATIONS  (PRN):  acetaminophen     Tablet .. 650 milliGRAM(s) Oral every 6 hours PRN Temp greater or equal to 38C (100.4F), Mild Pain (1 - 3)  ALPRAZolam 0.5 milliGRAM(s) Oral two times a day PRN for anxiety  melatonin 3 milliGRAM(s) Oral at bedtime PRN Insomnia

## 2025-01-20 NOTE — DIETITIAN INITIAL EVALUATION ADULT - PERTINENT LABORATORY DATA
01-19    140  |  108  |  19  ----------------------------<  85  4.9   |  23  |  0.93    Ca    8.5      19 Jan 2025 07:02  Phos  3.2     01-18  Mg     2.0     01-18    TPro  6.3  /  Alb  2.1[L]  /  TBili  0.6  /  DBili  x   /  AST  20  /  ALT  9[L]  /  AlkPhos  105  01-19  A1C with Estimated Average Glucose Result: 5.5 % (01-19-25 @ 07:03)

## 2025-01-20 NOTE — DIETITIAN INITIAL EVALUATION ADULT - ORAL NUTRITION SUPPLEMENTS
added smoothies on strawberry days (M,T,W)   added smoothies on strawberry days (M,T,W)  add [place] x 2 daily

## 2025-01-20 NOTE — PROGRESS NOTE ADULT - ASSESSMENT
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                       Lethargy related to malignancy and deconditioning.                        Edema could be more due to hypoalbuminemia.  No recurrent DVT noted on U/S.                       Episodic dyspnea on admission likely multifactorial.  Denies having this now.    Plan:                 Therapeutic paracentesis.  Can find out from IR how many doses of Eliquis to hold prior to procedure.  Depending on answer, may need to bridge with heparin.                Physical therapy and care management as patient will need rehab and oncology visit and chemo initiation while there if progress at rehab not sufficient in time to be home for urgent initiation of treatment of her tumor.                Would have oncolgy see patient to help coordinate.

## 2025-01-20 NOTE — DIETITIAN INITIAL EVALUATION ADULT - ORAL INTAKE PTA/DIET HISTORY
yogurt for breakfast, fruit for lunch, fresh Italian prepared foods from local store, frozen [employer] meals

## 2025-01-20 NOTE — PROGRESS NOTE ADULT - SUBJECTIVE AND OBJECTIVE BOX
No c/o dyspnea, pain.    LE venous duplex without evidence of DT.  Prior R DVT no longer visualized.        REVIEW OF SYSTEMS:  CARDIOVASCULAR: No chest pain, dyspnea or palpitations  All other review of systems is negative unless indicated above    Medications:  acetaminophen     Tablet .. 650 milliGRAM(s) Oral every 6 hours PRN  ALPRAZolam 0.5 milliGRAM(s) Oral two times a day PRN  apixaban 5 milliGRAM(s) Oral every 12 hours  atorvastatin 20 milliGRAM(s) Oral at bedtime  levothyroxine 100 MICROGram(s) Oral daily  melatonin 3 milliGRAM(s) Oral at bedtime PRN      Physical Exam:  Vitals:  T(C): 36.5 (01-20-25 @ 04:45), Max: 36.8 (01-19-25 @ 20:23)  HR: 81 (01-20-25 @ 04:45) (78 - 93)  BP: 101/63 (01-20-25 @ 04:45) (101/63 - 135/82)  BP(mean): --  RR: 18 (01-20-25 @ 04:45) (17 - 18)  SpO2: 95% (01-20-25 @ 04:45) (92% - 96%)  Wt(kg): --  Daily     Daily   I&O's Summary    19 Jan 2025 07:01  -  20 Jan 2025 07:00  --------------------------------------------------------  IN: 400 mL / OUT: 600 mL / NET: -200 mL      Appearance:  Normal, NAD  Eyes:  EOMI  HEENT: Normal oral mucosa NC/AT  Neck:  No JVD  Respiratory: Clear to auscultation bilaterally  Cardiovascular: Normal S1 and S2 without murmurs, rubs or gallops  Abdomen:    Softly distended,  Non-tender  Extremities: 1 + BL leg edema          01-19      Complete Blood Count (01.19.25 @ 07:03)   Nucleated RBC: 0 /100 WBCs  WBC Count: 7.28 K/uL  RBC Count: 4.19 M/uL  Hemoglobin: 12.9 g/dL  Hematocrit: 40.3 %  Mean Cell Volume: 96.2 fl  Mean Cell Hemoglobin: 30.8 pg  Mean Cell Hemoglobin Conc: 32.0 g/dL  Red Cell Distrib Width: 17.7 %  Platelet Count - Automated: 347 K/uL      140  |  108  |  19  ----------------------------<  85  4.9   |  23  |  0.93    Ca    8.5      19 Jan 2025 07:02  Phos  3.2     01-18  Mg     2.0     01-18    TPro  6.3  /  Alb  2.1[L]  /  TBili  0.6  /  DBili  x   /  AST  20  /  ALT  9[L]  /  AlkPhos  105  01-19    PT/INR - ( 18 Jan 2025 13:49 )   PT: 18.7 sec;   INR: 1.65 ratio         PTT - ( 18 Jan 2025 13:49 )  PTT:30.5 sec

## 2025-01-21 ENCOUNTER — APPOINTMENT (OUTPATIENT)
Dept: HEMATOLOGY ONCOLOGY | Facility: CLINIC | Age: 89
End: 2025-01-21

## 2025-01-21 LAB
ALBUMIN FLD-MCNC: 1.7 G/DL — SIGNIFICANT CHANGE UP
ANION GAP SERPL CALC-SCNC: 10 MMOL/L — SIGNIFICANT CHANGE UP (ref 5–17)
B PERT IGG+IGM PNL SER: CLEAR — SIGNIFICANT CHANGE UP
BUN SERPL-MCNC: 18 MG/DL — SIGNIFICANT CHANGE UP (ref 7–23)
CALCIUM SERPL-MCNC: 8.5 MG/DL — SIGNIFICANT CHANGE UP (ref 8.4–10.5)
CHLORIDE SERPL-SCNC: 104 MMOL/L — SIGNIFICANT CHANGE UP (ref 96–108)
CO2 SERPL-SCNC: 22 MMOL/L — SIGNIFICANT CHANGE UP (ref 22–31)
COLOR FLD: YELLOW
CREAT SERPL-MCNC: 0.89 MG/DL — SIGNIFICANT CHANGE UP (ref 0.5–1.3)
EGFR: 62 ML/MIN/1.73M2 — SIGNIFICANT CHANGE UP
FLUID INTAKE SUBSTANCE CLASS: SIGNIFICANT CHANGE UP
GLUCOSE FLD-MCNC: 95 MG/DL — SIGNIFICANT CHANGE UP
GLUCOSE SERPL-MCNC: 79 MG/DL — SIGNIFICANT CHANGE UP (ref 70–99)
GRAM STN FLD: SIGNIFICANT CHANGE UP
HCT VFR BLD CALC: 40 % — SIGNIFICANT CHANGE UP (ref 34.5–45)
HGB BLD-MCNC: 12.8 G/DL — SIGNIFICANT CHANGE UP (ref 11.5–15.5)
INR BLD: 1.3 RATIO — HIGH (ref 0.85–1.16)
LDH SERPL L TO P-CCNC: 621 U/L — SIGNIFICANT CHANGE UP
LYMPHOCYTES # FLD: 71 % — SIGNIFICANT CHANGE UP
MCHC RBC-ENTMCNC: 31.2 PG — SIGNIFICANT CHANGE UP (ref 27–34)
MCHC RBC-ENTMCNC: 32 G/DL — SIGNIFICANT CHANGE UP (ref 32–36)
MCV RBC AUTO: 97.6 FL — SIGNIFICANT CHANGE UP (ref 80–100)
MESOTHL CELL # FLD: SIGNIFICANT CHANGE UP
MONOS+MACROS # FLD: 26 % — SIGNIFICANT CHANGE UP
NEUTROPHILS-BODY FLUID: 2 % — SIGNIFICANT CHANGE UP
NRBC # BLD: 0 /100 WBCS — SIGNIFICANT CHANGE UP (ref 0–0)
NRBC BLD-RTO: 0 /100 WBCS — SIGNIFICANT CHANGE UP (ref 0–0)
OTHER CELLS FLD MANUAL: 1 % — SIGNIFICANT CHANGE UP
PLATELET # BLD AUTO: 339 K/UL — SIGNIFICANT CHANGE UP (ref 150–400)
POTASSIUM SERPL-MCNC: 4 MMOL/L — SIGNIFICANT CHANGE UP (ref 3.5–5.3)
POTASSIUM SERPL-SCNC: 4 MMOL/L — SIGNIFICANT CHANGE UP (ref 3.5–5.3)
PROT FLD-MCNC: 4.4 G/DL — SIGNIFICANT CHANGE UP
PROTHROM AB SERPL-ACNC: 14.9 SEC — HIGH (ref 9.9–13.4)
RBC # BLD: 4.1 M/UL — SIGNIFICANT CHANGE UP (ref 3.8–5.2)
RBC # FLD: 17.7 % — HIGH (ref 10.3–14.5)
RCV VOL RI: <2000 CELLS/UL — HIGH
SODIUM SERPL-SCNC: 136 MMOL/L — SIGNIFICANT CHANGE UP (ref 135–145)
SPECIMEN SOURCE: SIGNIFICANT CHANGE UP
TOTAL CELLS COUNTED, BODY FLUID: 246 CELLS — SIGNIFICANT CHANGE UP
TOTAL NUCLEATED CELL COUNT, BODY FLUID: 246 CELLS/UL — SIGNIFICANT CHANGE UP
TUBE TYPE: SIGNIFICANT CHANGE UP
WBC # BLD: 6.86 K/UL — SIGNIFICANT CHANGE UP (ref 3.8–10.5)
WBC # FLD AUTO: 6.86 K/UL — SIGNIFICANT CHANGE UP (ref 3.8–10.5)
WBC COUNT.: 236 CELLS/UL — SIGNIFICANT CHANGE UP

## 2025-01-21 PROCEDURE — 99223 1ST HOSP IP/OBS HIGH 75: CPT | Mod: GC

## 2025-01-21 PROCEDURE — 49082 ABD PARACENTESIS: CPT | Mod: 1L

## 2025-01-21 PROCEDURE — 88108 CYTOPATH CONCENTRATE TECH: CPT | Mod: 26

## 2025-01-21 PROCEDURE — 49083 ABD PARACENTESIS W/IMAGING: CPT

## 2025-01-21 RX ORDER — LEVOTHYROXINE SODIUM 25 UG/1
125 TABLET ORAL DAILY
Refills: 0 | Status: DISCONTINUED | OUTPATIENT
Start: 2025-01-21 | End: 2025-01-27

## 2025-01-21 RX ORDER — APIXABAN 5 MG/1
5 TABLET, FILM COATED ORAL EVERY 12 HOURS
Refills: 0 | Status: DISCONTINUED | OUTPATIENT
Start: 2025-01-21 | End: 2025-01-27

## 2025-01-21 RX ADMIN — APIXABAN 5 MILLIGRAM(S): 5 TABLET, FILM COATED ORAL at 17:57

## 2025-01-21 RX ADMIN — LEVOTHYROXINE SODIUM 100 MICROGRAM(S): 25 TABLET ORAL at 05:18

## 2025-01-21 RX ADMIN — Medication 0.5 MILLIGRAM(S): at 21:18

## 2025-01-21 RX ADMIN — LEVOTHYROXINE SODIUM 125 MICROGRAM(S): 25 TABLET ORAL at 21:19

## 2025-01-21 RX ADMIN — ATORVASTATIN CALCIUM 20 MILLIGRAM(S): 80 TABLET, FILM COATED ORAL at 21:18

## 2025-01-21 RX ADMIN — ACETAMINOPHEN 650 MILLIGRAM(S): 160 SUSPENSION ORAL at 16:26

## 2025-01-21 RX ADMIN — ACETAMINOPHEN 650 MILLIGRAM(S): 160 SUSPENSION ORAL at 17:26

## 2025-01-21 NOTE — CONSULT NOTE ADULT - SUBJECTIVE AND OBJECTIVE BOX
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 She believes she is deconditioned from her prolonged hospital stay recently where she was diagnosed with malignancy, PE, and had paracentesis for ascites. She denies any major source of pain, and also denies headstrike or LOC. She has not yet followed up outpatient because she was too weak to go to her appointment with gyn/onc. Reports abdominal and LE swelling have worsened since discharge, but are not as bad as previous hospitalization. Patient denies fever, chills, chest pain, SOB, headache, dizziness, abd pain, nausea, vomiting. (18 Jan 2025 23:44)      14 point ROS otherwise negative    PAST MEDICAL & SURGICAL HISTORY:  Hypothyroidism      HLD (hyperlipidemia)      Pulmonary embolism      H/O total knee replacement, left      S/P cholecystectomy          Allergies    No Known Allergies    Intolerances        MEDICATIONS  (STANDING):  atorvastatin 20 milliGRAM(s) Oral at bedtime  levothyroxine 100 MICROGram(s) Oral daily    MEDICATIONS  (PRN):  acetaminophen     Tablet .. 650 milliGRAM(s) Oral every 6 hours PRN Temp greater or equal to 38C (100.4F), Mild Pain (1 - 3)  ALPRAZolam 0.5 milliGRAM(s) Oral two times a day PRN for anxiety  melatonin 3 milliGRAM(s) Oral at bedtime PRN Insomnia      FAMILY HISTORY:      SOCIAL HISTORY: No EtOH, no tobacco        VITALS:   T(F): 98.5 (01-21-25 @ 13:10), Max: 98.6 (01-20-25 @ 21:03)  HR: 92 (01-21-25 @ 13:10)  BP: 139/78 (01-21-25 @ 13:10)  RR: 18 (01-21-25 @ 13:10)  SpO2: 97% (01-21-25 @ 13:10)  Wt(kg): --    PHYSICAL EXAM    CONSTITUTIONAL: No apparent distress  EYES: PERRLA and symmetric, EOMI, No conjunctival or scleral injection, non-icteric  ENMT: Oral mucosa with moist membranes.  NECK: Supple, symmetric. No JVD.  RESP: No respiratory distress, no use of accessory muscles; CTA b/l, no WRR  CV: RRR, +S1S2, no MRG  GI: Soft, NT, no rebound, no guarding. Moderately distended.  : No suprapubic tenderness. No CVA tenderness.  LYMPH: No cervical LAD or tenderness  MSK: No spinal tenderness, normal muscle strength/tone  EXTREMITIES: 1+ b/l pedal edema  SKIN: No rashes or ulcers noted  NEURO: A+Ox3, responsive. No tremor  PSYCH: Appropriate insight/judgment; mood and affect appropriate    LABS:                         12.8   6.86  )-----------( 339      ( 21 Jan 2025 06:57 )             40.0     01-21    136  |  104  |  18  ----------------------------<  79  4.0   |  22  |  0.89    Ca    8.5      21 Jan 2025 07:01        PT/INR - ( 21 Jan 2025 07:03 )   PT: 14.9 sec;   INR: 1.30 ratio           Ascites Fl  01-03 @ 13:01   No growth at 5 days  --    polymorphonuclear leukocytes seen  No organisms seen  by cytocentrifuge          IMAGING:

## 2025-01-21 NOTE — CONSULT NOTE ADULT - ASSESSMENT
89yo with Hx of recently diagnosed GYN malignancy (not yet on chemotherapy) and PE/R femoral DVT (on eliquis), ascites s/p paracentesis presenting s/p 3 falls last week. She was discharged from the hospital 1/9/25 after presenting with PE/DVT and large volume ascites, peritoneal carcinomatosis and a complex pelvic mass concerning for malignancy of GYN origin. Patient underwent a paracentesis with IR on 1/3/25, 3500cc ascites evacuated with peritoneal fluid sent for cytology. This admission, she had a paracentesis removing more than 6L ascites fluid.     Patient with good performance status and underwent a L knee replacement on 12/4. She lives alone in a multi-story colonial, does her own grocery shopping and cares largely for herself. Her daughter had been visiting this past month for postop assistance after her knee replacement.  No history of smoking. She denies first or second degree relatives with h/o uterine, ovarian, cervical or breast cancer.      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  - 1/3/25 peritoneal fluid biopsy PAX-8, CLAUDIN-4, MOC-31, WT1, P16: positive   P53: null pattern. The immunostains results support the diagnosis of carcinoma   of GYN (MULLERIAN) origin, favor of adnexal/peritoneal origin.       Recommendations:   - Evaluated by GYN oncology last admission. Given extent of disease, patient would require neoadjuvant chemotherapy and not candidate for upfront debulking   - She has a good performance status and would be a candidate for neoadjuvant chemotherapy. Dr. Cole planned to start single agent chemotherapy if patient is amenable and start doublet if she tolerates. Would consider starting chemotherapy inpatient if patient agreeable.  - Continue full anticoagulation for recent PE/DVTs if no contraindications  - Oncology will continue to follow  - If within goals of care, will set up follow up appointment with Dr. Cole at hospital discharge    NOTE INCOMPLETE UNTIL ATTENDING SIGNS    ***************************************************************  Supriya Canales, PGY5  Fellow Hematology/Oncology  pager: [phone]   Available on Microsoft Teams  After 5pm or on weekends please contact  to page on-call fellow   ***************************************************************   88yo with Hx of recently diagnosed GYN malignancy (not yet on chemotherapy) and PE/R femoral DVT (on eliquis), ascites s/p paracentesis presenting s/p 3 falls last week. She was discharged from the hospital 1/9/25 after presenting with PE/DVT and large volume ascites, peritoneal carcinomatosis and a complex pelvic mass concerning for malignancy of GYN origin. Patient underwent a paracentesis with IR on 1/3/25, 3500cc ascites evacuated with peritoneal fluid sent for cytology. This admission, she had a paracentesis removing more than 6L ascites fluid.     Patient with good performance status and underwent a L knee replacement on 12/4. She lives alone in a multi-story [place], does her own grocery shopping and cares largely for herself. Her daughter had been visiting this past month for postop assistance after her knee replacement.  No history of smoking. She denies first or second degree relatives with h/o uterine, ovarian, cervical or breast cancer.      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  - 1/3/25 peritoneal fluid biopsy PAX-8, CLAUDIN-4, MOC-31, WT1, P16: positive   P53: null pattern. The immunostains results support the diagnosis of carcinoma   of GYN (MULLERIAN) origin, favor of adnexal/peritoneal origin.       Recommendations:   - Evaluated by GYN oncology last admission. Given extent of disease, patient would require neoadjuvant chemotherapy and not candidate for upfront debulking   - She has a good performance status and would be a candidate for neoadjuvant chemotherapy. Dr. Cole planned to start single agent chemotherapy if patient is amenable and start doublet if she tolerates.   - Patient is agreeable to start chemotherapy and we will start C1D1 with single agent carboplatin AUC 5 1/23/25. Consent obtained. Will provide chemotherapy orders tomorrow. Discussed with patient, daughter and son bedside and answered all questions to their satisfaction.   - Continue full anticoagulation for recent PE/DVTs if no contraindications  - Oncology will continue to follow  - Will set up follow up appointment with Dr. Cole at hospital discharge. Patient will need a rehabilitation facility that can bring her back and forth to [facility] as she will need chemotherapy every 3 weeks.    NOTE INCOMPLETE UNTIL ATTENDING SIGNS    ***************************************************************  Supriya Canales, PGY5  Fellow Hematology/Oncology  pager: [phone]   Available on enrich-in Teams  After 5pm or on weekends please contact  to page on-call fellow   ***************************************************************   89yo with Hx of recently diagnosed GYN malignancy (not yet on chemotherapy) and PE/R femoral DVT (on eliquis), ascites s/p paracentesis presenting s/p 3 falls last week. She was discharged from the hospital 1/9/25 after presenting with PE/DVT and large volume ascites, peritoneal carcinomatosis and a complex pelvic mass concerning for malignancy of GYN origin. Patient underwent a paracentesis with IR on 1/3/25, 3500cc ascites evacuated with peritoneal fluid sent for cytology. This admission, she had a paracentesis removing more than 6L ascites fluid.     Patient with good performance status and underwent a L knee replacement on 12/4. She lives alone in a multi-story colonial, does her own grocery shopping and cares largely for herself. Her daughter had been visiting this past month for postop assistance after her knee replacement.  No history of smoking. She denies first or second degree relatives with h/o uterine, ovarian, cervical or breast cancer.      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  - 1/3/25 peritoneal fluid biopsy PAX-8, CLAUDIN-4, MOC-31, WT1, P16: positive   P53: null pattern. The immunostains results support the diagnosis of carcinoma   of GYN (MULLERIAN) origin, favor of adnexal/peritoneal origin.       Recommendations:   - Evaluated by GYN oncology last admission. Given extent of disease, patient would require neoadjuvant chemotherapy and not candidate for upfront debulking   - She has a good performance status and would be a candidate for neoadjuvant chemotherapy. Dr. Cole planned to start single agent chemotherapy if patient is amenable and start doublet if she tolerates.   - Patient is agreeable to start chemotherapy and we will start C1D1 with single agent carboplatin AUC 5 1/23/25. Consent obtained. Will provide chemotherapy orders tomorrow. Discussed with patient, daughter and son bedside and answered all questions to their satisfaction.   - Continue full anticoagulation for recent PE/DVTs if no contraindications  - Oncology will continue to follow  - Will set up follow up appointment with Dr. Cole at hospital discharge. Patient will need a rehabilitation facility that can bring her back and forth to [facility] as she will need chemotherapy every 3 weeks.      ***************************************************************  Supriya Canales, PGY5  Fellow Hematology/Oncology  pager: [phone]   Available on [employer] Teams  After 5pm or on weekends please contact  to page on-call fellow   ***************************************************************

## 2025-01-21 NOTE — CONSULT NOTE ADULT - ATTENDING COMMENTS
89yo with Hx of recently diagnosed GYN malignancy presented with multiple falls and failure to thrive at home.  Imaging and outpatient treatment plan reviewed.  Status post paracentesis with relief in symptoms.  Will plan to give 1 dose of chemotherapy with carboplatin in the next 1 to 2 days to reduce symptom burden.  Will need rehab placement afterwards and every 2 to 3-week visit to the cancer center.  Plan discussed with the patient and children at bedside.  All questions were answered.  Informed consent for chemotherapy was obtained.
None

## 2025-01-21 NOTE — PROGRESS NOTE ADULT - ASSESSMENT
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                       Lethargy related to malignancy and deconditioning.                        Edema could be more due to hypoalbuminemia.  No recurrent DVT noted on U/S.                       Episodic dyspnea on admission likely multifactorial.  Denies having this now.    Plan:                 Therapeutic paracentesis.                  Physical therapy and care management as patient will need rehab and oncology visit and chemo initiation while there if progress at rehab not sufficient in time to be home for urgent initiation of treatment of her tumor.  Possibly inpatient initiation as per oncology consult.

## 2025-01-21 NOTE — PROGRESS NOTE ADULT - SUBJECTIVE AND OBJECTIVE BOX
Seen this AM.  Subsequent results and procedures now noted.    Denies chest discomfort, dyspnea, pain.    Had > 4L taken off at paracentesis.        REVIEW OF SYSTEMS:  CARDIOVASCULAR: No chest pain, dyspnea or palpitations  All other review of systems is negative unless indicated above    Medications:  acetaminophen     Tablet .. 650 milliGRAM(s) Oral every 6 hours PRN  ALPRAZolam 0.5 milliGRAM(s) Oral two times a day PRN  apixaban 5 milliGRAM(s) Oral every 12 hours  atorvastatin 20 milliGRAM(s) Oral at bedtime  levothyroxine 125 MICROGram(s) Oral daily  melatonin 3 milliGRAM(s) Oral at bedtime PRN      Physical Exam:  Vitals:  T(C): 36.9 (01-21-25 @ 13:10), Max: 37 (01-20-25 @ 21:03)  HR: 92 (01-21-25 @ 13:10) (73 - 97)  BP: 139/78 (01-21-25 @ 13:10) (107/75 - 139/78)  BP(mean): --  RR: 18 (01-21-25 @ 13:10) (18 - 18)  SpO2: 97% (01-21-25 @ 13:10) (95% - 99%)  Wt(kg): --  Daily     Daily   I&O's Summary    21 Jan 2025 07:01  -  21 Jan 2025 19:02  --------------------------------------------------------  IN: 240 mL / OUT: 0 mL / NET: 240 mL        Appearance:  Normal, NAD  Eyes:  EOMI  HEENT: Normal oral mucosa NC/AT  Neck:  No JVD  Respiratory: Clear to auscultation bilaterally  Cardiovascular: Normal S1 and S2 without murmurs, rubs or gallops  Abdomen:    Softly distended,  Non-tender  Extremities: 1 + BL leg edema          01-21    Complete Blood Count (01.21.25 @ 06:57)   Nucleated RBC: 0 /100 WBCs  WBC Count: 6.86 K/uL  RBC Count: 4.10 M/uL  Hemoglobin: 12.8 g/dL  Hematocrit: 40.0 %  Mean Cell Volume: 97.6 fl  Mean Cell Hemoglobin: 31.2 pg  Mean Cell Hemoglobin Conc: 32.0 g/dL  Red Cell Distrib Width: 17.7 %  Platelet Count - Automated: 339 K/uL    136  |  104  |  18  ----------------------------<  79  4.0   |  22  |  0.89    Ca    8.5      21 Jan 2025 07:01      PT/INR - ( 21 Jan 2025 07:03 )   PT: 14.9 sec;   INR: 1.30 ratio

## 2025-01-21 NOTE — CHART NOTE - NSCHARTNOTEFT_GEN_A_CORE
Patient Demographic Information (PDI)       PDI	First Name	Last Name	Birth Date	Gender	Street Address	[facility]	Zip Code  A	Oksana	Cadiff	1935	Female	[address]	[place]	[address]  B	Oksana	Cadiff	1935	Female	[address]	[place]	[address]    Prescription Information      PDI Filter:    PDI	My Rx	Current Rx	Drug Type	Rx Written	Rx Dispensed	Drug	Quantity	Days Supply	Prescriber Name	Prescriber OWEN #	Payment Method	Dispenser  A	N	N	O	12/11/2024	12/14/2024	oxycodone hcl (ir) 5 mg tablet	24	5	Jake Van D	[id number]	Medicare	[id number]  A	N	N	B	12/03/2024	12/06/2024	alprazolam 0.5 mg tablet	60	30	Tim Gross MD	[id number]	Medicare	[id number]  A	N	N	B	11/04/2024	11/08/2024	alprazolam 0.5 mg tablet	60	30	RenatoTim aguilera MD	[id number]	Medicare	[id number]  A	N	N	B	10/01/2024	10/02/2024	alprazolam 0.5 mg tablet	60	30	RenatoTim aguilera MD	[id number]	Medicare	[id number]  A	N	N	B	08/30/2024	09/03/2024	alprazolam 0.5 mg tablet	60	30	RenatoTim aguilera MD	[id number]	Medicare	[id number]  A	N	N	B	07/29/2024	07/31/2024	alprazolam 0.5 mg tablet	60	30	RenatoTim aguilera MD	[id number]	Medicare	[id number]  A	N	N	B	06/27/2024	07/01/2024	alprazolam 0.5 mg tablet	60	30	RenatoTim aguilera MD	[id number]	Medicare	[id number]  A	N	N	B	05/20/2024	05/22/2024	alprazolam 0.5 mg tablet	60	30	RenatoTim aguilera MD	[id number]	Medicare	[id number]  A	N	N	B	04/18/2024	04/21/2024	alprazolam 0.5 mg tablet	60	30	RenatoTim aguilera MD	[id number]	Medicare	[id number]  A	N	N	O	03/24/2024	03/26/2024	oxycodone hcl (ir) 5 mg tablet	28	7	Jayden Schroeder	[id number]	Medicare	[id number]  A	N	N	B	03/18/2024	03/19/2024	alprazolam 0.5 mg tablet	60	30	Tim Gross MD	[id number]	Medicare	[id number]  A	N	N	O	03/07/2024	03/07/2024	oxycodone hcl (ir) 5 mg tablet	28	7	Jake Van D	[id number]	Medicare	[id number]  A	N	N	B	02/19/2024	02/20/2024	alprazolam 0.5 mg tablet	60	30	Tim Gross MD	[id number]	Medicare	[id number]  B	N	N	O	12/04/2024	12/04/2024	oxycodone hcl (ir) 5 mg tablet	26	7	Michelle Smith	[id number]	Insurance	[facility]
89F with newly diagnosed Mullerian cancer not yet started treatment, PE/DVT on eliquis presented with 3 falls in the last week, denies LOC or head trauma. She received a paracentesis today draining 6.8L and was recommended for rehab by inhouse PT. No plans for inpatient or chemotherapy while in rehab.   [ ] Will follow with Dr. Cole after rehabilitation    ***************************************************************  Supriya Canales, PGY5  Fellow Hematology/Oncology  pager: [phone]   Available on Microsoft Teams  After 5pm or on weekends please contact  to page on-call fellow   ***************************************************************

## 2025-01-21 NOTE — PRE PROCEDURE NOTE - PRE PROCEDURE EVALUATION
Interventional Radiology    HPI: 88 yo female with peritoneal carinomatosis presenting with recurrent ascites. Last para 1/3, drained 6.8L. Patient now presents to IR for paracentesis.     Allergies: No Known Allergies    Medications (Abx/Cardiac/Anticoagulation/Blood Products)  apixaban: 5 milliGRAM(s) Oral (01-20 @ 06:22)    Data:  T(C): 36.7  HR: 88  BP: 131/80  RR: 18  SpO2: 96%    Exam  General: No acute distress  Chest: Non labored breathing    -WBC 6.86 / HgB 12.8 / Hct 40.0 / Plt 339  -Na 136 / Cl 104 / BUN 18 / Glucose 79  -K 4.0 / CO2 22 / Cr 0.89  -ALT -- / Alk Phos -- / T.Bili --  -INR1.30    Imaging:       Plan: 89y Female presents for paracentesis   -Risks/Benefits/alternatives explained with the patient and/or healthcare proxy and witnessed informed consent obtained.

## 2025-01-21 NOTE — ADVANCED PRACTICE NURSE CONSULT - ASSESSMENT
arrived on unit, patient was found lying in a low air loss pressure redistribution support surface style bed. The patient  is unable to turn independently and staff assistance x 1 was provided. Once turned, incontinence of urine  was apparent and geraldine care was provided and, able to view her skin, pure Wick present on patient to help divert urine.   bilateral sacrum/coccyx/buttock non blanchable deep red discoloration , and  hyperpigmentation , consistent with a deep tissue , size approximately  10 cm x 8 cm x 0 cm, present  on admission.  left heel  there is non- blanchable deep  red discoloration  noted to measure approximately 3 cm x 3 cm x 0, consistent with a deep tissue injury,  present on admission.  right heel  there is non- blanchable deep  red discoloration  noted to measure approximately 3 cm x 3 cm x 0cm,consistent with a deep tissue injury,  present on admission.  patient  was educated  on the importance  for turning  and positioning  every 2 hours. The use of waffle cushion when out of bed  to chair,  and to shift her Weight every 2 hours while in chair. The importance a keeping her skin clean and dry and  to offload feet/heels , and optimal nutrition.

## 2025-01-21 NOTE — CONSULT NOTE ADULT - REASON FOR ADMISSION
Renita Cullen was seen and treated in our emergency department on 11/26/2022  Diagnosis:     Keisha Vincent  is off the rest of the shift today  He may return on this date: If you have any questions or concerns, please don't hesitate to call        Solomon Quinn, DO    ______________________________           _______________          _______________  Hospital Representative                              Date                                Time
Fall, physical deconditioning, ascites

## 2025-01-22 LAB — PATHOLOGIST REVIEW: SIGNIFICANT CHANGE UP

## 2025-01-22 RX ADMIN — ATORVASTATIN CALCIUM 20 MILLIGRAM(S): 80 TABLET, FILM COATED ORAL at 19:11

## 2025-01-22 RX ADMIN — LEVOTHYROXINE SODIUM 125 MICROGRAM(S): 25 TABLET ORAL at 05:33

## 2025-01-22 RX ADMIN — APIXABAN 5 MILLIGRAM(S): 5 TABLET, FILM COATED ORAL at 05:33

## 2025-01-22 RX ADMIN — APIXABAN 5 MILLIGRAM(S): 5 TABLET, FILM COATED ORAL at 17:01

## 2025-01-22 RX ADMIN — Medication 0.5 MILLIGRAM(S): at 19:11

## 2025-01-22 NOTE — PROGRESS NOTE ADULT - ASSESSMENT
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                       Lethargy related to malignancy and deconditioning.                        Edema could be more due to hypoalbuminemia.  No recurrent DVT noted on U/S.                       Episodic dyspnea on admission likely multifactorial.  Denies having this now.    Plan:    Chemotherapy inpatient as per oncology.

## 2025-01-22 NOTE — PROGRESS NOTE ADULT - SUBJECTIVE AND OBJECTIVE BOX
Patient seen this AM.  Subsequent vitals and noted seen.    No new c/o.    Denies dyspnea, pain.    REVIEW OF SYSTEMS:  CARDIOVASCULAR: No chest pain, dyspnea or palpitations  All other review of systems is negative unless indicated above    Medications:  acetaminophen     Tablet .. 650 milliGRAM(s) Oral every 6 hours PRN  ALPRAZolam 0.5 milliGRAM(s) Oral two times a day PRN  apixaban 5 milliGRAM(s) Oral every 12 hours  atorvastatin 20 milliGRAM(s) Oral at bedtime  levothyroxine 125 MICROGram(s) Oral daily  melatonin 3 milliGRAM(s) Oral at bedtime PRN      Physical Exam:  Vitals:  T(C): 36.8 (01-22-25 @ 13:18), Max: 36.9 (01-21-25 @ 20:44)  HR: 95 (01-22-25 @ 13:18) (84 - 95)  BP: 117/53 (01-22-25 @ 13:18) (106/70 - 117/53)  BP(mean): --  RR: 18 (01-22-25 @ 13:18) (18 - 18)  SpO2: 98% (01-22-25 @ 13:18) (94% - 98%)  Wt(kg): --  Daily     Daily   I&O's Summary    21 Jan 2025 07:01  -  22 Jan 2025 07:00  --------------------------------------------------------  IN: 240 mL / OUT: 0 mL / NET: 240 mL    22 Jan 2025 07:01  -  22 Jan 2025 18:13  --------------------------------------------------------  IN: 240 mL / OUT: 350 mL / NET: -110 mL        Appearance:  Normal, NAD  Eyes:  EOMI  HEENT: Normal oral mucosa NC/AT  Neck:  No JVD  Respiratory: Clear to auscultation bilaterally  Cardiovascular: Normal S1 and S2 without murmurs, rubs or gallops  Abdomen:    Minimal abdominal distention, much improved compared to yesterday  Non-tender  Extremities: 1/2 + BL leg edema        01-23 No labs

## 2025-01-23 LAB
ALBUMIN SERPL ELPH-MCNC: 2 G/DL — LOW (ref 3.3–5)
ALP SERPL-CCNC: 137 U/L — HIGH (ref 40–120)
ALT FLD-CCNC: 16 U/L — SIGNIFICANT CHANGE UP (ref 10–45)
ANION GAP SERPL CALC-SCNC: 10 MMOL/L — SIGNIFICANT CHANGE UP (ref 5–17)
AST SERPL-CCNC: 31 U/L — SIGNIFICANT CHANGE UP (ref 10–40)
BILIRUB SERPL-MCNC: 0.6 MG/DL — SIGNIFICANT CHANGE UP (ref 0.2–1.2)
BUN SERPL-MCNC: 16 MG/DL — SIGNIFICANT CHANGE UP (ref 7–23)
CALCIUM SERPL-MCNC: 8.4 MG/DL — SIGNIFICANT CHANGE UP (ref 8.4–10.5)
CHLORIDE SERPL-SCNC: 103 MMOL/L — SIGNIFICANT CHANGE UP (ref 96–108)
CO2 SERPL-SCNC: 24 MMOL/L — SIGNIFICANT CHANGE UP (ref 22–31)
CREAT SERPL-MCNC: 0.77 MG/DL — SIGNIFICANT CHANGE UP (ref 0.5–1.3)
EGFR: 74 ML/MIN/1.73M2 — SIGNIFICANT CHANGE UP
GLUCOSE SERPL-MCNC: 88 MG/DL — SIGNIFICANT CHANGE UP (ref 70–99)
POTASSIUM SERPL-MCNC: 4.2 MMOL/L — SIGNIFICANT CHANGE UP (ref 3.5–5.3)
POTASSIUM SERPL-SCNC: 4.2 MMOL/L — SIGNIFICANT CHANGE UP (ref 3.5–5.3)
PROT SERPL-MCNC: 6.6 G/DL — SIGNIFICANT CHANGE UP (ref 6–8.3)
SODIUM SERPL-SCNC: 137 MMOL/L — SIGNIFICANT CHANGE UP (ref 135–145)

## 2025-01-23 PROCEDURE — 99233 SBSQ HOSP IP/OBS HIGH 50: CPT | Mod: GC

## 2025-01-23 RX ORDER — DIPHENHYDRAMINE HCL 25 MG
25 CAPSULE ORAL ONCE
Refills: 0 | Status: DISCONTINUED | OUTPATIENT
Start: 2025-01-23 | End: 2025-01-27

## 2025-01-23 RX ORDER — ONDANSETRON 4 MG/1
8 TABLET, ORALLY DISINTEGRATING ORAL EVERY 8 HOURS
Refills: 0 | Status: COMPLETED | OUTPATIENT
Start: 2025-01-23 | End: 2025-01-26

## 2025-01-23 RX ORDER — FAMOTIDINE 10 MG/ML
20 INJECTION INTRAVENOUS ONCE
Refills: 0 | Status: DISCONTINUED | OUTPATIENT
Start: 2025-01-23 | End: 2025-01-27

## 2025-01-23 RX ORDER — DEXAMETHASONE SODIUM PHOSPHATE 4 MG/ML
8 INJECTION, SOLUTION INTRA-ARTICULAR; INTRALESIONAL; INTRAMUSCULAR; INTRAVENOUS; SOFT TISSUE ONCE
Refills: 0 | Status: COMPLETED | OUTPATIENT
Start: 2025-01-23 | End: 2025-01-23

## 2025-01-23 RX ORDER — FOSAPREPITANT 150 MG/5ML
150 INJECTION, POWDER, LYOPHILIZED, FOR SOLUTION INTRAVENOUS ONCE
Refills: 0 | Status: COMPLETED | OUTPATIENT
Start: 2025-01-23 | End: 2025-01-23

## 2025-01-23 RX ORDER — BACTERIOSTATIC SODIUM CHLORIDE 0.9 %
500 VIAL (ML) INJECTION
Refills: 0 | Status: COMPLETED | OUTPATIENT
Start: 2025-01-23 | End: 2025-01-23

## 2025-01-23 RX ORDER — CARBOPLATIN 10 MG/ML
280 INJECTION, SOLUTION INTRAVENOUS ONCE
Refills: 0 | Status: COMPLETED | OUTPATIENT
Start: 2025-01-23 | End: 2025-01-23

## 2025-01-23 RX ADMIN — Medication 0.5 MILLIGRAM(S): at 21:21

## 2025-01-23 RX ADMIN — APIXABAN 5 MILLIGRAM(S): 5 TABLET, FILM COATED ORAL at 05:03

## 2025-01-23 RX ADMIN — CARBOPLATIN 280 MILLIGRAM(S): 10 INJECTION, SOLUTION INTRAVENOUS at 16:15

## 2025-01-23 RX ADMIN — FOSAPREPITANT 300 MILLIGRAM(S): 150 INJECTION, POWDER, LYOPHILIZED, FOR SOLUTION INTRAVENOUS at 14:40

## 2025-01-23 RX ADMIN — Medication 0.5 MILLIGRAM(S): at 05:05

## 2025-01-23 RX ADMIN — ATORVASTATIN CALCIUM 20 MILLIGRAM(S): 80 TABLET, FILM COATED ORAL at 21:22

## 2025-01-23 RX ADMIN — DEXAMETHASONE SODIUM PHOSPHATE 101.6 MILLIGRAM(S): 4 INJECTION, SOLUTION INTRA-ARTICULAR; INTRALESIONAL; INTRAMUSCULAR; INTRAVENOUS; SOFT TISSUE at 15:15

## 2025-01-23 RX ADMIN — ONDANSETRON 8 MILLIGRAM(S): 4 TABLET, ORALLY DISINTEGRATING ORAL at 13:34

## 2025-01-23 RX ADMIN — LEVOTHYROXINE SODIUM 125 MICROGRAM(S): 25 TABLET ORAL at 05:04

## 2025-01-23 RX ADMIN — Medication 100 MILLILITER(S): at 15:01

## 2025-01-23 RX ADMIN — APIXABAN 5 MILLIGRAM(S): 5 TABLET, FILM COATED ORAL at 17:35

## 2025-01-23 RX ADMIN — ACETAMINOPHEN, DIPHENHYDRAMINE HCL, PHENYLEPHRINE HCL 3 MILLIGRAM(S): 325; 25; 5 TABLET ORAL at 21:21

## 2025-01-23 RX ADMIN — ONDANSETRON 8 MILLIGRAM(S): 4 TABLET, ORALLY DISINTEGRATING ORAL at 21:22

## 2025-01-23 NOTE — PROGRESS NOTE ADULT - ASSESSMENT
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                       Lethargy related to malignancy and deconditioning.                     No recurrent DVT noted on U/S.                       Episodic dyspnea on admission likely multifactorial.  Denies having this now.    Plan:     Follow labs post-chemo.

## 2025-01-23 NOTE — ADVANCED PRACTICE NURSE CONSULT - REASON FOR CONSULT
chemotherapy note 
Consult to assess patient skin initiated by RN for sacrum/ coccyx pressure injury present on admission.     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 She believes she is deconditioned from her prolonged hospital stay recently where she was diagnosed with malignancy, PE, and had paracentesis for ascites. She denies any major source of pain, and also denies headstrike or LOC. She has not yet followed up outpatient because she was too weak to go to her appointment with gyn/onc. Reports abdominal and LE swelling have worsened since discharge, but are not as bad as previous hospitalization. Patient denies fever, chills, chest pain, SOB, headache, dizziness, abd pain, nausea, vomiting.

## 2025-01-23 NOTE — ADVANCED PRACTICE NURSE CONSULT - RECOMMEDATIONS
Impression   urinary incontinence    bilateral sacrum/coccyx /buttock deep tissue injury, present on admission  left heel deep tissue injury , present on admission.  right heel deep tissue injury, present on admission.     Recommendations   1. Bilateral sacrum/coccyx/buttock deep tissue injury   Topical therapy- sacral/bilateral buttocks- cleanse w/incontinent cleanser, pat dry & apply juliet cream  twice daily & prn soiling . Monitor for changes .  2. Right and left heel  deep tissue injury  ( A). cleans heels with normal saline pat dry and apply no sting barrier film ( Cavilon ) daily and monitor for changes .  (B)Elevate heels; apply Complete Cair air fluidized boots; ensure that the soles of the feet are not resting on the foot board of the bed.  3  Incontinent management - incontinent cleanser, pads,  geraldine care  BID  4. Maintain on an alternating air with low air loss surface   5. Turn & reposition every 2 hr; Use positioning pillow to turn and reposition, soft pillow between bony prominences; continue measures to decrease friction/shear/pressure.  6. Nutrition optimization.  7.  Place waffle cushion when out of bed to chair .  and PRN when soiled,  monitor for changes.  will not follow , please recall for new issues  plan of care reviewed with covering staff Nuha Delong (MARIBEL)
monitor labs,   encourage fluids   I/O  N/V

## 2025-01-23 NOTE — PROGRESS NOTE ADULT - SUBJECTIVE AND OBJECTIVE BOX
Patient seen this AM. Subsequent events reviewed.    No c/o dyspnea, CP, abdominal pain.    Received carboplatin tonight.        REVIEW OF SYSTEMS:  CARDIOVASCULAR: No chest pain, dyspnea or palpitations  All other review of systems is negative unless indicated above    Medications:  acetaminophen     Tablet .. 650 milliGRAM(s) Oral every 6 hours PRN  ALPRAZolam 0.5 milliGRAM(s) Oral two times a day PRN  apixaban 5 milliGRAM(s) Oral every 12 hours  atorvastatin 20 milliGRAM(s) Oral at bedtime  diphenhydrAMINE Injectable 25 milliGRAM(s) IV Push once PRN  famotidine Injectable 20 milliGRAM(s) IV Push once PRN  levothyroxine 125 MICROGram(s) Oral daily  melatonin 3 milliGRAM(s) Oral at bedtime PRN  ondansetron Injectable 8 milliGRAM(s) IV Push every 8 hours      Physical Exam:  Vitals:  T(C): 36.4 (25 @ 17:30), Max: 37.1 (25 @ 11:18)  HR: 84 (25 @ 17:30) (80 - 88)  BP: 109/67 (25 @ 17:30) (98/67 - 122/75)  BP(mean): --  RR: 18 (25 @ 17:30) (18 - 18)  SpO2: 96% (25 @ 17:30) (94% - 97%)  Wt(kg): --  Daily     Daily Weight in k.8 (2025 09:09)  I&O's Summary    2025 07:01  -  2025 07:00  --------------------------------------------------------  IN: 240 mL / OUT: 550 mL / NET: -310 mL    2025 07:01  -  2025 19:40  --------------------------------------------------------  IN: 578 mL / OUT: 1 mL / NET: 577 mL        Appearance:  Normal, NAD  Eyes:  EOMI  HEENT: Normal oral mucosa NC/AT  Neck:  No JVD  Respiratory: Clear to auscultation bilaterally  Cardiovascular: Normal S1 and S2 without murmurs, rubs or gallops  Abdomen:    Minimal abdominal distention, much improved compared to yest  Extremities: Trace BL leg edema          137  |  103  |  16  ----------------------------<  88  4.2   |  24  |  0.77    Ca    8.4      2025 14:18    TPro  6.6  /  Alb  2.0[L]  /  TBili  0.6  /  DBili  x   /  AST  31  /  ALT  16  /  AlkPhos  137[H]

## 2025-01-23 NOTE — ADVANCED PRACTICE NURSE CONSULT - ASSESSMENT
received pt in bed awake alert not in any distress able to express her needs  Cycle #1 day 1/1.Height and weight verified. no labs done in 2 days as per dr ahmet pope to give chemotherapy with no labs from today labs ok on 1/21/25 later team ordered CMP stat and drawn  and  results ok  Vital signs stable prior to the start of chemotherapy, see sunrise.  Pt educated on the importance of saving urine, verbalize understanding of same.Pt education done re chemo regimen, drug effects and potential side effects, written material provided, pt states understanding. .Placed anew piv #22 on left hand  by the area nurse site free from signs and symptoms of infection, positive blood return obtained. Pre-medicated with zofran 8mg ivp q8hrs decadron 8mg ivss, Emend 150mg ivss pt started on carboplatin AUC 4 =280mg iv infuse over 1hr intiated at 1615    received pt in bed awake alert not in any distress able to express her needs  Cycle #1 day 1/1.Height and weight verified. no labs done in 2 days as per dr ahmet pope to give chemotherapy with no labs from today labs ok on 1/21/25 later team ordered CMP stat and drawn  and  results ok  Vital signs stable prior to the start of chemotherapy, see sunrise.  Pt educated on the importance of saving urine, verbalize understanding of same.Pt education done re chemo regimen, drug effects and potential side effects, written material provided, pt states understanding. .Placed anew piv #22 on left hand  by the area nurse site free from signs and symptoms of infection, positive blood return obtained. Pre-medicated with zofran 8mg ivp q8hrs decadron 8mg ivss, Emend 150mg ivss  pt  prehydrated with NS at 100cc/hr prior to the start of chemotherapy and to continue after infusion completed  for 500cc pt started on carboplatin AUC 4 =280mg iv infuse over 1hr intiated at 1615    received pt in bed awake alert not in any distress able to express her needs  Cycle #1 day 1/1.Height and weight verified. no labs done in 2 days as per dr ahmet pope to give chemotherapy with no labs from today labs ok on 1/21/25 later team ordered CMP stat and drawn  and  results ok  Vital signs stable prior to the start of chemotherapy, see sunrise.  Pt educated on the importance of saving urine, verbalize understanding of same.Pt education done re chemo regimen, drug effects and potential side effects, written material provided, pt states understanding. .Placed anew piv #22 on left hand  by the area nurse site free from signs and symptoms of infection, positive blood return obtained. Pre-medicated with zofran 8mg ivp q8hrs decadron 8mg ivss, Emend 150mg ivss  pt  prehydrated with NS at 100cc/hr 1hr  prior to the start of chemotherapy and to continue after infusion completed  for 500cc pt started on carboplatin AUC 4 =280mg iv infuse over 1hr intiated at 1615    received pt in bed awake alert not in any distress able to express her needs  Cycle #1 day 1/1.Height and weight verified. no labs done in 2 days as per dr ahmet pope to give chemotherapy with no labs from today labs ok on 1/21/25 later team ordered CMP stat and drawn  and  results ok  Vital signs stable prior to the start of chemotherapy, see sunrise.  Pt educated on the importance of saving urine, verbalize understanding of same.Pt education done re chemo regimen, drug effects and potential side effects, written material provided, pt states understanding. .Placed anew piv #22 on left hand  by the area nurse site free from signs and symptoms of infection, positive blood return obtained. Pre-medicated with zofran 8mg ivp q8hrs decadron 8mg ivss, Emend 150mg ivss  pt  prehydrated with NS at 100cc/hr 1hr clarified the iv fluid order with dr Portillo advice to prehydrate 1 hr prior and hold it while carbo infusion and  to continue after infusion completed  for 500cc pt started on carboplatin AUC 4 =280mg iv infuse over 1hr intiated at 1615 completed the infusion with no adverse effects post v/s stable and documented report given to the area nurse    83

## 2025-01-23 NOTE — PROGRESS NOTE ADULT - SUBJECTIVE AND OBJECTIVE BOX
INTERVAL HPI/OVERNIGHT EVENTS:  Patient S&E at bedside. No o/n events, patient resting comfortably.      VITAL SIGNS:  T(F): 97.7 (01-23-25 @ 21:10)  HR: 94 (01-23-25 @ 21:10)  BP: 109/69 (01-23-25 @ 21:10)  RR: 17 (01-23-25 @ 21:10)  SpO2: 95% (01-23-25 @ 21:10)  Wt(kg): --    PHYSICAL EXAM:    Constitutional: NAD  Eyes: EOMI, sclera non-icteric  Neck: supple, no masses, no JVD  Respiratory: CTA b/l, good air entry b/l  Cardiovascular: RRR, no M/R/G  Gastrointestinal: Slightly distended  Extremities: no c/c/e  Neurological: AAOx3      MEDICATIONS  (STANDING):  apixaban 5 milliGRAM(s) Oral every 12 hours  atorvastatin 20 milliGRAM(s) Oral at bedtime  levothyroxine 125 MICROGram(s) Oral daily  ondansetron Injectable 8 milliGRAM(s) IV Push every 8 hours    MEDICATIONS  (PRN):  acetaminophen     Tablet .. 650 milliGRAM(s) Oral every 6 hours PRN Temp greater or equal to 38C (100.4F), Mild Pain (1 - 3)  ALPRAZolam 0.5 milliGRAM(s) Oral two times a day PRN for anxiety  diphenhydrAMINE Injectable 25 milliGRAM(s) IV Push once PRN carboplatin reaction  famotidine Injectable 20 milliGRAM(s) IV Push once PRN carboplatin reaction  melatonin 3 milliGRAM(s) Oral at bedtime PRN Insomnia      Allergies    No Known Allergies    Intolerances        LABS:    01-23    137  |  103  |  16  ----------------------------<  88  4.2   |  24  |  0.77    Ca    8.4      23 Jan 2025 14:18    TPro  6.6  /  Alb  2.0[L]  /  TBili  0.6  /  DBili  x   /  AST  31  /  ALT  16  /  AlkPhos  137[H]  01-23      Urinalysis Basic - ( 23 Jan 2025 14:18 )    Color: x / Appearance: x / SG: x / pH: x  Gluc: 88 mg/dL / Ketone: x  / Bili: x / Urobili: x   Blood: x / Protein: x / Nitrite: x   Leuk Esterase: x / RBC: x / WBC x   Sq Epi: x / Non Sq Epi: x / Bacteria: x        RADIOLOGY & ADDITIONAL TESTS:  Studies reviewed.    ASSESSMENT & PLAN:  INTERVAL HPI/OVERNIGHT EVENTS:  Patient S&E at bedside. No o/n events, patient resting comfortably.      VITAL SIGNS:  T(F): 97.7 (01-23-25 @ 21:10)  HR: 94 (01-23-25 @ 21:10)  BP: 109/69 (01-23-25 @ 21:10)  RR: 17 (01-23-25 @ 21:10)  SpO2: 95% (01-23-25 @ 21:10)  Wt(kg): --    PHYSICAL EXAM:    CONSTITUTIONAL: No apparent distress  EYES: PERRLA and symmetric, EOMI, No conjunctival or scleral injection, non-icteric  ENMT: Oral mucosa with moist membranes.  NECK: Supple, symmetric. No JVD.  RESP: No respiratory distress, no use of accessory muscles; CTA b/l, no WRR  CV: RRR, +S1S2, no MRG  GI: Soft, NT, no rebound, no guarding. Moderately distended.  : No suprapubic tenderness. No CVA tenderness.  LYMPH: No cervical LAD or tenderness  MSK: No spinal tenderness, normal muscle strength/tone  EXTREMITIES: 1+ b/l pedal edema  SKIN: No rashes or ulcers noted  NEURO: A+Ox3, responsive. No tremor  PSYCH: Appropriate insight/judgment; mood and affect appropriate      MEDICATIONS  (STANDING):  apixaban 5 milliGRAM(s) Oral every 12 hours  atorvastatin 20 milliGRAM(s) Oral at bedtime  levothyroxine 125 MICROGram(s) Oral daily  ondansetron Injectable 8 milliGRAM(s) IV Push every 8 hours    MEDICATIONS  (PRN):  acetaminophen     Tablet .. 650 milliGRAM(s) Oral every 6 hours PRN Temp greater or equal to 38C (100.4F), Mild Pain (1 - 3)  ALPRAZolam 0.5 milliGRAM(s) Oral two times a day PRN for anxiety  diphenhydrAMINE Injectable 25 milliGRAM(s) IV Push once PRN carboplatin reaction  famotidine Injectable 20 milliGRAM(s) IV Push once PRN carboplatin reaction  melatonin 3 milliGRAM(s) Oral at bedtime PRN Insomnia      Allergies    No Known Allergies    Intolerances        LABS:    01-23    137  |  103  |  16  ----------------------------<  88  4.2   |  24  |  0.77    Ca    8.4      23 Jan 2025 14:18    TPro  6.6  /  Alb  2.0[L]  /  TBili  0.6  /  DBili  x   /  AST  31  /  ALT  16  /  AlkPhos  137[H]  01-23      Urinalysis Basic - ( 23 Jan 2025 14:18 )    Color: x / Appearance: x / SG: x / pH: x  Gluc: 88 mg/dL / Ketone: x  / Bili: x / Urobili: x   Blood: x / Protein: x / Nitrite: x   Leuk Esterase: x / RBC: x / WBC x   Sq Epi: x / Non Sq Epi: x / Bacteria: x        RADIOLOGY & ADDITIONAL TESTS:  Studies reviewed.    ASSESSMENT & PLAN:

## 2025-01-23 NOTE — PROGRESS NOTE ADULT - ATTENDING COMMENTS
88yo with Hx of recently diagnosed GYN malignancy presented with multiple falls and failure to thrive at home.  Imaging and outpatient treatment plan reviewed.  Status post paracentesis with relief in symptoms.  Will plan to give 1 dose of chemotherapy with carboplatin in the next 1 to 2 days to reduce symptom burden.  Will need rehab placement afterwards and every 2 to 3-week visit to the cancer center.  Plan discussed with the patient and children at bedside.  All questions were answered.  Informed consent for chemotherapy was obtained.

## 2025-01-23 NOTE — PROGRESS NOTE ADULT - ASSESSMENT
89yo with Hx of recently diagnosed GYN malignancy and PE/R femoral DVT (on eliquis), ascites s/p paracentesis presenting s/p 3 falls last week. She had very good performance status for her age prior to acute illness.  She was independent in all ADLs but currently is able to walk a few steps with assistance.  She has been evaluated by  GYN oncology and neoadjuvant chemotherapy was recommended.  She has seen Dr. Crescencio Cole in the outpatient setting.  Discussed inpatient chemotherapy.  Given overall poor performance status, recommend carboplatin AUC 4 single agent.  If she tolerates well and recover in the rehab, would consider adding Taxol with the next cycle.  Recommend case management evaluation to find rehab where she can continue outpatient visits with medical oncology as well as chemotherapy.  Patient and children in agreement.    Plan to give carboplatin with AUC 4.  Creatinine reviewed from 2 days ago.  Recommend daily labs.  Side effects of chemotherapy were reviewed in detail and informed consent was obtained.  Appropriate antiemetic regimen was prescribed.         88yo with Hx of recently diagnosed GYN malignancy (not yet on chemotherapy) and PE/R femoral DVT (on eliquis), ascites s/p paracentesis presenting s/p 3 falls last week. She was discharged from the hospital 1/9/25 after presenting with PE/DVT and large volume ascites, peritoneal carcinomatosis and a complex pelvic mass concerning for malignancy of GYN origin. Patient underwent a paracentesis with IR on 1/3/25, 3500cc ascites evacuated with peritoneal fluid sent for cytology. This admission, she had a paracentesis removing more than 6L ascites fluid. She had very good performance status for her age prior to acute illness.  She was independent in all ADLs but currently is able to walk a few steps with assistance.  She has been evaluated by  GYN oncology and neoadjuvant chemotherapy was recommended.  She has seen Dr. Crescencio Cole in the outpatient setting.  Discussed inpatient chemotherapy.  Given overall poor performance status, recommend carboplatin AUC 4 single agent.  If she tolerates well and recover in the rehab, would consider adding Taxol with the next cycle.  Recommend case management evaluation to find rehab where she can continue outpatient visits with medical oncology as well as chemotherapy.  Patient and children in agreement.      Patient with good performance status and underwent a L knee replacement on 12/4. She [place] in [place]-story [place], does her own grocery shopping and cares largely for herself. Her daughter had been visiting this past month for postop assistance after her knee replacement.  No history of smoking. She denies first or second degree relatives with h/o uterine, ovarian, cervical or breast cancer.      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  - 1/3/25 peritoneal fluid biopsy PAX-8, CLAUDIN-4, MOC-31, WT1, P16: positive P53: null pattern. 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     Recommendations:   - Evaluated by GYN oncology last admission. Given extent of disease, patient started neoadjuvant chemotherapy (1/23/25 with Carboplatin) and not candidate for upfront debulking   - She has a good performance status and is a candidate for neoadjuvant chemotherapy. Dr. Cole started single agent chemotherapy if patient is amenable and start doublet (given ar [facility]) if she tolerates.   - Started C1D1 single agent carboplatin AUC 4 1/23/25. Consent obtained.   - Continue full anticoagulation for recent PE/DVTs if no contraindications  - Oncology will continue to follow  - Please get CBC w/ diff daily and CMP daily  - Will set up follow up appointment with Dr. Cole at hospital discharge. Patient will need a rehabilitation facility that can bring her back and forth to [facility] as she will need chemotherapy every 3 weeks.      ***************************************************************  Supriya Canales, PGY5  Fellow Hematology/Oncology  pager: [phone]   Available on [employer] Teams  After 5pm or on weekends please contact  to page on-call fellow   ***************************************************************

## 2025-01-24 LAB
ALBUMIN SERPL ELPH-MCNC: 2 G/DL — LOW (ref 3.3–5)
ALP SERPL-CCNC: 135 U/L — HIGH (ref 40–120)
ALT FLD-CCNC: 17 U/L — SIGNIFICANT CHANGE UP (ref 10–45)
ANION GAP SERPL CALC-SCNC: 10 MMOL/L — SIGNIFICANT CHANGE UP (ref 5–17)
AST SERPL-CCNC: 24 U/L — SIGNIFICANT CHANGE UP (ref 10–40)
BASOPHILS # BLD AUTO: 0.01 K/UL — SIGNIFICANT CHANGE UP (ref 0–0.2)
BASOPHILS NFR BLD AUTO: 0.2 % — SIGNIFICANT CHANGE UP (ref 0–2)
BILIRUB SERPL-MCNC: 0.4 MG/DL — SIGNIFICANT CHANGE UP (ref 0.2–1.2)
BUN SERPL-MCNC: 14 MG/DL — SIGNIFICANT CHANGE UP (ref 7–23)
CALCIUM SERPL-MCNC: 8.3 MG/DL — LOW (ref 8.4–10.5)
CHLORIDE SERPL-SCNC: 106 MMOL/L — SIGNIFICANT CHANGE UP (ref 96–108)
CO2 SERPL-SCNC: 20 MMOL/L — LOW (ref 22–31)
CREAT SERPL-MCNC: 0.7 MG/DL — SIGNIFICANT CHANGE UP (ref 0.5–1.3)
EGFR: 83 ML/MIN/1.73M2 — SIGNIFICANT CHANGE UP
EOSINOPHIL # BLD AUTO: 0 K/UL — SIGNIFICANT CHANGE UP (ref 0–0.5)
EOSINOPHIL NFR BLD AUTO: 0 % — SIGNIFICANT CHANGE UP (ref 0–6)
GLUCOSE SERPL-MCNC: 165 MG/DL — HIGH (ref 70–99)
HCT VFR BLD CALC: 41.1 % — SIGNIFICANT CHANGE UP (ref 34.5–45)
HGB BLD-MCNC: 13.1 G/DL — SIGNIFICANT CHANGE UP (ref 11.5–15.5)
IMM GRANULOCYTES NFR BLD AUTO: 0.9 % — SIGNIFICANT CHANGE UP (ref 0–0.9)
LYMPHOCYTES # BLD AUTO: 1.3 K/UL — SIGNIFICANT CHANGE UP (ref 1–3.3)
LYMPHOCYTES # BLD AUTO: 28.6 % — SIGNIFICANT CHANGE UP (ref 13–44)
MCHC RBC-ENTMCNC: 30.5 PG — SIGNIFICANT CHANGE UP (ref 27–34)
MCHC RBC-ENTMCNC: 31.9 G/DL — LOW (ref 32–36)
MCV RBC AUTO: 95.8 FL — SIGNIFICANT CHANGE UP (ref 80–100)
MONOCYTES # BLD AUTO: 0.14 K/UL — SIGNIFICANT CHANGE UP (ref 0–0.9)
MONOCYTES NFR BLD AUTO: 3.1 % — SIGNIFICANT CHANGE UP (ref 2–14)
NEUTROPHILS # BLD AUTO: 3.05 K/UL — SIGNIFICANT CHANGE UP (ref 1.8–7.4)
NEUTROPHILS NFR BLD AUTO: 67.2 % — SIGNIFICANT CHANGE UP (ref 43–77)
NRBC # BLD: 0 /100 WBCS — SIGNIFICANT CHANGE UP (ref 0–0)
NRBC BLD-RTO: 0 /100 WBCS — SIGNIFICANT CHANGE UP (ref 0–0)
PLATELET # BLD AUTO: 322 K/UL — SIGNIFICANT CHANGE UP (ref 150–400)
POTASSIUM SERPL-MCNC: 4.3 MMOL/L — SIGNIFICANT CHANGE UP (ref 3.5–5.3)
POTASSIUM SERPL-SCNC: 4.3 MMOL/L — SIGNIFICANT CHANGE UP (ref 3.5–5.3)
PROT SERPL-MCNC: 6.5 G/DL — SIGNIFICANT CHANGE UP (ref 6–8.3)
RBC # BLD: 4.29 M/UL — SIGNIFICANT CHANGE UP (ref 3.8–5.2)
RBC # FLD: 17.2 % — HIGH (ref 10.3–14.5)
SODIUM SERPL-SCNC: 136 MMOL/L — SIGNIFICANT CHANGE UP (ref 135–145)
WBC # BLD: 4.54 K/UL — SIGNIFICANT CHANGE UP (ref 3.8–10.5)
WBC # FLD AUTO: 4.54 K/UL — SIGNIFICANT CHANGE UP (ref 3.8–10.5)

## 2025-01-24 PROCEDURE — 99232 SBSQ HOSP IP/OBS MODERATE 35: CPT | Mod: GC

## 2025-01-24 RX ADMIN — LEVOTHYROXINE SODIUM 125 MICROGRAM(S): 25 TABLET ORAL at 05:14

## 2025-01-24 RX ADMIN — ATORVASTATIN CALCIUM 20 MILLIGRAM(S): 80 TABLET, FILM COATED ORAL at 20:12

## 2025-01-24 RX ADMIN — ONDANSETRON 8 MILLIGRAM(S): 4 TABLET, ORALLY DISINTEGRATING ORAL at 20:11

## 2025-01-24 RX ADMIN — APIXABAN 5 MILLIGRAM(S): 5 TABLET, FILM COATED ORAL at 05:14

## 2025-01-24 RX ADMIN — ACETAMINOPHEN, DIPHENHYDRAMINE HCL, PHENYLEPHRINE HCL 3 MILLIGRAM(S): 325; 25; 5 TABLET ORAL at 20:11

## 2025-01-24 RX ADMIN — Medication 0.5 MILLIGRAM(S): at 05:23

## 2025-01-24 RX ADMIN — APIXABAN 5 MILLIGRAM(S): 5 TABLET, FILM COATED ORAL at 16:56

## 2025-01-24 RX ADMIN — Medication 0.5 MILLIGRAM(S): at 20:11

## 2025-01-24 RX ADMIN — ONDANSETRON 8 MILLIGRAM(S): 4 TABLET, ORALLY DISINTEGRATING ORAL at 05:15

## 2025-01-24 NOTE — PROGRESS NOTE ADULT - SUBJECTIVE AND OBJECTIVE BOX
INTERVAL HPI/OVERNIGHT EVENTS:  No overnight events.     MEDICATIONS  (STANDING):  apixaban 5 milliGRAM(s) Oral every 12 hours  atorvastatin 20 milliGRAM(s) Oral at bedtime  levothyroxine 125 MICROGram(s) Oral daily  ondansetron Injectable 8 milliGRAM(s) IV Push every 8 hours    MEDICATIONS  (PRN):  acetaminophen     Tablet .. 650 milliGRAM(s) Oral every 6 hours PRN Temp greater or equal to 38C (100.4F), Mild Pain (1 - 3)  ALPRAZolam 0.5 milliGRAM(s) Oral two times a day PRN for anxiety  diphenhydrAMINE Injectable 25 milliGRAM(s) IV Push once PRN carboplatin reaction  famotidine Injectable 20 milliGRAM(s) IV Push once PRN carboplatin reaction  melatonin 3 milliGRAM(s) Oral at bedtime PRN Insomnia    Allergies    No Known Allergies    Intolerances          VITAL SIGNS:  T(F): 97.2 (01-24-25 @ 12:01)  HR: 80 (01-24-25 @ 12:01)  BP: 109/67 (01-24-25 @ 12:01)  RR: 17 (01-24-25 @ 12:01)  SpO2: 94% (01-24-25 @ 12:01)  Wt(kg): --    PHYSICAL EXAM:      CONSTITUTIONAL: No apparent distress  EYES: PERRLA and symmetric, EOMI, No conjunctival or scleral injection, non-icteric  ENMT: Oral mucosa with moist membranes.  NECK: Supple, symmetric. No JVD.  RESP: No respiratory distress, no use of accessory muscles; CTA b/l, no WRR  CV: RRR, +S1S2, no MRG  GI: Soft, NT, no rebound, no guarding. Moderately distended.  : No suprapubic tenderness. No CVA tenderness.  LYMPH: No cervical LAD or tenderness  MSK: No spinal tenderness, normal muscle strength/tone  EXTREMITIES: 1+ b/l pedal edema  SKIN: No rashes or ulcers noted  NEURO: A+Ox3, responsive. No tremor  PSYCH: Appropriate insight/judgment; mood and affect appropriate        LABS:                        13.1   4.54  )-----------( 322      ( 24 Jan 2025 07:08 )             41.1     01-24    136  |  106  |  14  ----------------------------<  165[H]  4.3   |  20[L]  |  0.70    Ca    8.3[L]      24 Jan 2025 07:08    TPro  6.5  /  Alb  2.0[L]  /  TBili  0.4  /  DBili  x   /  AST  24  /  ALT  17  /  AlkPhos  135[H]  01-24      Urinalysis Basic - ( 24 Jan 2025 07:08 )    Color: x / Appearance: x / SG: x / pH: x  Gluc: 165 mg/dL / Ketone: x  / Bili: x / Urobili: x   Blood: x / Protein: x / Nitrite: x   Leuk Esterase: x / RBC: x / WBC x   Sq Epi: x / Non Sq Epi: x / Bacteria: x        RADIOLOGY & ADDITIONAL TESTS:  Studies reviewed.

## 2025-01-24 NOTE — PROGRESS NOTE ADULT - SUBJECTIVE AND OBJECTIVE BOX
No c/o dyspnea, nausea, pain.    Received carboplatin yesterday.    States urinating a lot.        REVIEW OF SYSTEMS:  CARDIOVASCULAR: No chest pain, dyspnea or palpitations  All other review of systems is negative unless indicated above    Medications:  acetaminophen     Tablet .. 650 milliGRAM(s) Oral every 6 hours PRN  ALPRAZolam 0.5 milliGRAM(s) Oral two times a day PRN  apixaban 5 milliGRAM(s) Oral every 12 hours  atorvastatin 20 milliGRAM(s) Oral at bedtime  diphenhydrAMINE Injectable 25 milliGRAM(s) IV Push once PRN  famotidine Injectable 20 milliGRAM(s) IV Push once PRN  levothyroxine 125 MICROGram(s) Oral daily  melatonin 3 milliGRAM(s) Oral at bedtime PRN  ondansetron Injectable 8 milliGRAM(s) IV Push every 8 hours      Physical Exam:  Vitals:  T(C): 36.3 (25 @ 05:07), Max: 37.1 (25 @ 11:18)  HR: 73 (25 @ 05:07) (73 - 94)  BP: 125/72 (25 @ 05:07) (98/67 - 125/72)  BP(mean): --  RR: 17 (25 @ 05:07) (17 - 18)  SpO2: 94% (25 @ 05:07) (94% - 96%)  Wt(kg): --  Daily     Daily Weight in k.8 (2025 09:09)  I&O's Summary    2025 07:01  -  2025 07:00  --------------------------------------------------------  IN: 578 mL / OUT: 702 mL / NET: -124 mL        Appearance:  Normal, NAD  Eyes:  EOMI  HEENT: Normal oral mucosa NC/AT  Neck:  No JVD  Respiratory: Clear to auscultation bilaterally  Cardiovascular: Normal S1 and S2 without murmurs, rubs or gallops  Abdomen:    Minimal abdominal distention, much improved compared to yest  Extremities: Trace BL leg edema            WBC Count: 4.54 K/uL  RBC Count: 4.29 M/uL  Hemoglobin: 13.1 g/dL  Hematocrit: 41.1 %  Mean Cell Volume: 95.8 fl  Mean Cell Hemoglobin: 30.5 pg  Mean Cell Hemoglobin Conc: 31.9 g/dL  Red Cell Distrib Width: 17.2 %  Platelet Count - Automated: 322 K/uL  Auto Neutrophil %: 67.2: Differential percentages must be correlated with absolute numbers for   clinical significance. %  Auto Lymphocyte %: 28.6 %  Auto Monocyte %: 3.1 %  Auto Eosinophil %: 0.0 %  Auto Basophil %: 0.2 %  Auto Immature Granulocyte %: 0.9:     136  |  106  |  14  ----------------------------<  165[H]  4.3   |  20[L]  |  0.70    Ca    8.3[L]      2025 07:08    TPro  6.5  /  Alb  2.0[L]  /  TBili  0.4  /  DBili  x   /  AST  24  /  ALT  17  /  AlkPhos  135[H]

## 2025-01-24 NOTE — PROGRESS NOTE ADULT - ATTENDING COMMENTS
89yo with Hx of recently diagnosed GYN malignancy, s/p carbo AUC 4 yesterday. Did well. Counts ok today. No NVD. Rec PT and DC to rehab

## 2025-01-24 NOTE — PROGRESS NOTE ADULT - ASSESSMENT
Impression:  No obvious side effects so far S/P.    Plan:  Continue current meds.            Daily labs.             Timing of rehab transfer to be Okd by oncology.

## 2025-01-24 NOTE — PROGRESS NOTE ADULT - ASSESSMENT
89yo with Hx of recently diagnosed GYN malignancy (not yet on chemotherapy) and PE/R femoral DVT (on eliquis), ascites s/p paracentesis presenting s/p 3 falls last week. She was discharged from the hospital 1/9/25 after presenting with PE/DVT and large volume ascites, peritoneal carcinomatosis and a complex pelvic mass concerning for malignancy of GYN origin. Patient underwent a paracentesis with IR on 1/3/25, 3500cc ascites evacuated with peritoneal fluid sent for cytology. This admission, she had a paracentesis removing more than 6L ascites fluid. She had very good performance status for her age prior to acute illness.  She was independent in all ADLs but currently is able to walk a few steps with assistance.  She has been evaluated by  GYN oncology and neoadjuvant chemotherapy was recommended.  She has seen Dr. Crescencio Cole in the outpatient setting.  Discussed inpatient chemotherapy.  Given overall poor performance status, recommend carboplatin AUC 4 single agent.  If she tolerates well and recover in the rehab, would consider adding Taxol with the next cycle.  Recommend case management evaluation to find rehab where she can continue outpatient visits with medical oncology as well as chemotherapy.  Patient and children in agreement.      Patient with good performance status and underwent a L knee replacement on 12/4. She [place] alone in a multi-story [place], does her own grocery shopping and cares largely for herself. Her daughter had been visiting this past month for postop assistance after her knee replacement.  No history of smoking. She denies first or second degree relatives with h/o uterine, ovarian, cervical or breast cancer.      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  - 1/3/25 peritoneal fluid biopsy PAX-8, CLAUDIN-4, MOC-31, WT1, P16: positive P53: null pattern. 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     Recommendations:   - Evaluated by GYN oncology last admission. Given extent of disease, patient started neoadjuvant chemotherapy (1/23/25 with Carboplatin) and not candidate for upfront debulking   - She has a good performance status and is a candidate for neoadjuvant chemotherapy. Dr. Cole started single agent chemotherapy if patient is amenable and start doublet (given ar ZCC) if she tolerates.   - Started C1D1 single agent carboplatin AUC 4 1/23/25. Consent obtained.   - Continue full anticoagulation for recent PE/DVTs if no contraindications  - Patient tolerated carboplatin well, endorses feeling "great" and "like something was lifted from her". No contraindications to discharge to rehab facility.   - Please get CBC w/ diff daily and CMP daily  - Will set up follow up appointment with Dr. Cole at hospital discharge. Patient will need a rehabilitation facility that can bring her back and forth to [facility] as she will need chemotherapy every 3 weeks.      ***************************************************************  Supriya Canales, PGY5  Fellow Hematology/Oncology  pager: [phone]   Available on Microsoft Teams  After 5pm or on weekends please contact  to page on-call fellow   ***************************************************************

## 2025-01-25 LAB
ANION GAP SERPL CALC-SCNC: 10 MMOL/L — SIGNIFICANT CHANGE UP (ref 5–17)
BASOPHILS # BLD AUTO: 0.01 K/UL — SIGNIFICANT CHANGE UP (ref 0–0.2)
BASOPHILS NFR BLD AUTO: 0.1 % — SIGNIFICANT CHANGE UP (ref 0–2)
BUN SERPL-MCNC: 15 MG/DL — SIGNIFICANT CHANGE UP (ref 7–23)
CALCIUM SERPL-MCNC: 8.2 MG/DL — LOW (ref 8.4–10.5)
CHLORIDE SERPL-SCNC: 104 MMOL/L — SIGNIFICANT CHANGE UP (ref 96–108)
CO2 SERPL-SCNC: 23 MMOL/L — SIGNIFICANT CHANGE UP (ref 22–31)
CREAT SERPL-MCNC: 0.77 MG/DL — SIGNIFICANT CHANGE UP (ref 0.5–1.3)
EGFR: 74 ML/MIN/1.73M2 — SIGNIFICANT CHANGE UP
EOSINOPHIL # BLD AUTO: 0.02 K/UL — SIGNIFICANT CHANGE UP (ref 0–0.5)
EOSINOPHIL NFR BLD AUTO: 0.2 % — SIGNIFICANT CHANGE UP (ref 0–6)
GLUCOSE SERPL-MCNC: 150 MG/DL — HIGH (ref 70–99)
HCT VFR BLD CALC: 36.9 % — SIGNIFICANT CHANGE UP (ref 34.5–45)
HGB BLD-MCNC: 11.9 G/DL — SIGNIFICANT CHANGE UP (ref 11.5–15.5)
IMM GRANULOCYTES NFR BLD AUTO: 0.5 % — SIGNIFICANT CHANGE UP (ref 0–0.9)
LYMPHOCYTES # BLD AUTO: 2.26 K/UL — SIGNIFICANT CHANGE UP (ref 1–3.3)
LYMPHOCYTES # BLD AUTO: 23.9 % — SIGNIFICANT CHANGE UP (ref 13–44)
MCHC RBC-ENTMCNC: 31.3 PG — SIGNIFICANT CHANGE UP (ref 27–34)
MCHC RBC-ENTMCNC: 32.2 G/DL — SIGNIFICANT CHANGE UP (ref 32–36)
MCV RBC AUTO: 97.1 FL — SIGNIFICANT CHANGE UP (ref 80–100)
MONOCYTES # BLD AUTO: 0.7 K/UL — SIGNIFICANT CHANGE UP (ref 0–0.9)
MONOCYTES NFR BLD AUTO: 7.4 % — SIGNIFICANT CHANGE UP (ref 2–14)
NEUTROPHILS # BLD AUTO: 6.42 K/UL — SIGNIFICANT CHANGE UP (ref 1.8–7.4)
NEUTROPHILS NFR BLD AUTO: 67.9 % — SIGNIFICANT CHANGE UP (ref 43–77)
NRBC # BLD: 0 /100 WBCS — SIGNIFICANT CHANGE UP (ref 0–0)
NRBC BLD-RTO: 0 /100 WBCS — SIGNIFICANT CHANGE UP (ref 0–0)
PLATELET # BLD AUTO: 311 K/UL — SIGNIFICANT CHANGE UP (ref 150–400)
POTASSIUM SERPL-MCNC: 4.2 MMOL/L — SIGNIFICANT CHANGE UP (ref 3.5–5.3)
POTASSIUM SERPL-SCNC: 4.2 MMOL/L — SIGNIFICANT CHANGE UP (ref 3.5–5.3)
RBC # BLD: 3.8 M/UL — SIGNIFICANT CHANGE UP (ref 3.8–5.2)
RBC # FLD: 17.3 % — HIGH (ref 10.3–14.5)
SODIUM SERPL-SCNC: 137 MMOL/L — SIGNIFICANT CHANGE UP (ref 135–145)
WBC # BLD: 9.46 K/UL — SIGNIFICANT CHANGE UP (ref 3.8–10.5)
WBC # FLD AUTO: 9.46 K/UL — SIGNIFICANT CHANGE UP (ref 3.8–10.5)

## 2025-01-25 RX ADMIN — ACETAMINOPHEN, DIPHENHYDRAMINE HCL, PHENYLEPHRINE HCL 3 MILLIGRAM(S): 325; 25; 5 TABLET ORAL at 20:53

## 2025-01-25 RX ADMIN — APIXABAN 5 MILLIGRAM(S): 5 TABLET, FILM COATED ORAL at 05:27

## 2025-01-25 RX ADMIN — APIXABAN 5 MILLIGRAM(S): 5 TABLET, FILM COATED ORAL at 17:29

## 2025-01-25 RX ADMIN — ATORVASTATIN CALCIUM 20 MILLIGRAM(S): 80 TABLET, FILM COATED ORAL at 20:55

## 2025-01-25 RX ADMIN — LEVOTHYROXINE SODIUM 125 MICROGRAM(S): 25 TABLET ORAL at 05:27

## 2025-01-25 RX ADMIN — Medication 0.5 MILLIGRAM(S): at 20:54

## 2025-01-25 NOTE — PROGRESS NOTE ADULT - SUBJECTIVE AND OBJECTIVE BOX
Ms. Durbin seen n room [facility] for Dr. TYRONE Gross  Ms Durbin lying flat in bed.  States not much of an appetite although otherwise feels well.  States she is drinking a great deal of fluids.  Was out of bed yesterday most of the day into the bedside chair.  States she can not walk safely to the bathroom.  No chest discomfort or shortness of breath      Review Of Systems:  Constitutional: No Fever, Chills,  No Fatigue,   HEENT: No Blurred vision, No Headache   Respiratory: No Cough, No sputum production, No Wheezing, No Shortness of breath  Cardiovascular: No Chest Pain, No Palpitations, No Lightheadedness, No Falling, No Syncope, No PEÑA, No PND, No Orthopnea, No Peripehral Edema  Gastrointestinal: No Abdominal Pain, No Diarrhea, No Constipation, No Nausea, No Vomiting, Normal Appetite   Genitourinary: No Dysuria  Extremities: No Swelling, No Claudication,   Neurologic:  No Focal deficit, No Weakness, No Dysphagia, No Paresthesias, No Syncope  Skin: No Rash,  No Ecchymoses, No Wounds, No Tenderness, No Drainage     Medications:  acetaminophen     Tablet .. 650 milliGRAM(s) Oral every 6 hours PRN  ALPRAZolam 0.5 milliGRAM(s) Oral two times a day PRN  apixaban 5 milliGRAM(s) Oral every 12 hours  atorvastatin 20 milliGRAM(s) Oral at bedtime  diphenhydrAMINE Injectable 25 milliGRAM(s) IV Push once PRN  famotidine Injectable 20 milliGRAM(s) IV Push once PRN  levothyroxine 125 MICROGram(s) Oral daily  melatonin 3 milliGRAM(s) Oral at bedtime PRN  ondansetron Injectable 8 milliGRAM(s) IV Push every 8 hours    PMH/PSH/FH/SH: Unchanged  Vitals:  T(C): 36.3 (01-25-25 @ 04:37), Max: 36.3 (01-24-25 @ 21:34)  HR: 72 (01-25-25 @ 04:37) (72 - 83)  BP: 112/75 (01-25-25 @ 04:37) (109/67 - 112/75)  BP(mean): --  RR: 17 (01-25-25 @ 04:37) (17 - 18)  SpO2: 94% (01-25-25 @ 04:37) (94% - 95%)  Wt(kg): --  Daily     Daily     Physical Exam:  Appearance:  Normal, NAD  Eyes: PERRL, EOMI  HENT:  Normal oral mucosa NC/AT  Neck: without hepatojugular reflux, carotid 2+ equal without bruits  No Thyromegaly  Cardiovascular: normal regular S1, physiologic split S2,  No m/r/g   Respiratory: Clear to percussion and auscultation bilaterally  Gastrointestinal: Soft, Non-tender, Non-distended, BS+, No masses, No significant fluid  Neurologic: Non-focal, No focal neurologic deficits  Skin: No rashes, No ecchymoses, No cyanosis, 1+ bilateral ankle edema  Pulses-No palpable pulse in either lower extremity    01-24    136  |  106  |  14  ----------------------------<  165[H]  4.3   |  20[L]  |  0.70    Ca    8.3[L]      24 Jan 2025 07:08    TPro  6.5  /  Alb  2.0[L]  /  TBili  0.4  /  DBili  x   /  AST  24  /  ALT  17  /  AlkPhos  135[H]  01-24 01-23 @ 07:01 - 01-24 @ 07:00  --------------------------------------------------------  IN: 578 mL / OUT: 702 mL / NET: -124 mL    01-24 @ 07:01 - 01-25 @ 05:50  --------------------------------------------------------  IN: 600 mL / OUT: 400 mL / NET: 200 mL

## 2025-01-25 NOTE — PROGRESS NOTE ADULT - ASSESSMENT
Ms Durbin is stable and very comfortable lying flat in bed.  Remains on oral anticoagulation with apixaban for history of recent DVT and pulmonary embolism.  Received chemotherapy without side effects or complications.  No clinical evidence of congestive heart failure.  No shortness of breath, chest discomfort or palpitations.  Cardiovascular status remains optimal.   Nutrition is a problem as not eating very well.  Has been evaluated by dietician and has recommendations.  Has hypoalbuminemia.  Continue current treatment.  Out of bed only with assistance as Ms Durbin indicates she is at risk of falling.

## 2025-01-26 LAB
BASOPHILS # BLD AUTO: 0.04 K/UL — SIGNIFICANT CHANGE UP (ref 0–0.2)
BASOPHILS NFR BLD AUTO: 0.4 % — SIGNIFICANT CHANGE UP (ref 0–2)
CULTURE RESULTS: SIGNIFICANT CHANGE UP
EOSINOPHIL # BLD AUTO: 0.2 K/UL — SIGNIFICANT CHANGE UP (ref 0–0.5)
EOSINOPHIL NFR BLD AUTO: 2.2 % — SIGNIFICANT CHANGE UP (ref 0–6)
HCT VFR BLD CALC: 38.8 % — SIGNIFICANT CHANGE UP (ref 34.5–45)
HGB BLD-MCNC: 12.4 G/DL — SIGNIFICANT CHANGE UP (ref 11.5–15.5)
IMM GRANULOCYTES NFR BLD AUTO: 0.3 % — SIGNIFICANT CHANGE UP (ref 0–0.9)
LYMPHOCYTES # BLD AUTO: 3.12 K/UL — SIGNIFICANT CHANGE UP (ref 1–3.3)
LYMPHOCYTES # BLD AUTO: 34.7 % — SIGNIFICANT CHANGE UP (ref 13–44)
MCHC RBC-ENTMCNC: 31.2 PG — SIGNIFICANT CHANGE UP (ref 27–34)
MCHC RBC-ENTMCNC: 32 G/DL — SIGNIFICANT CHANGE UP (ref 32–36)
MCV RBC AUTO: 97.5 FL — SIGNIFICANT CHANGE UP (ref 80–100)
MONOCYTES # BLD AUTO: 0.9 K/UL — SIGNIFICANT CHANGE UP (ref 0–0.9)
MONOCYTES NFR BLD AUTO: 10 % — SIGNIFICANT CHANGE UP (ref 2–14)
NEUTROPHILS # BLD AUTO: 4.71 K/UL — SIGNIFICANT CHANGE UP (ref 1.8–7.4)
NEUTROPHILS NFR BLD AUTO: 52.4 % — SIGNIFICANT CHANGE UP (ref 43–77)
NRBC # BLD: 0 /100 WBCS — SIGNIFICANT CHANGE UP (ref 0–0)
NRBC BLD-RTO: 0 /100 WBCS — SIGNIFICANT CHANGE UP (ref 0–0)
PLATELET # BLD AUTO: 299 K/UL — SIGNIFICANT CHANGE UP (ref 150–400)
RBC # BLD: 3.98 M/UL — SIGNIFICANT CHANGE UP (ref 3.8–5.2)
RBC # FLD: 17.5 % — HIGH (ref 10.3–14.5)
SPECIMEN SOURCE: SIGNIFICANT CHANGE UP
WBC # BLD: 9 K/UL — SIGNIFICANT CHANGE UP (ref 3.8–10.5)
WBC # FLD AUTO: 9 K/UL — SIGNIFICANT CHANGE UP (ref 3.8–10.5)

## 2025-01-26 RX ORDER — ALPRAZOLAM 2 MG
0.5 TABLET ORAL
Refills: 0 | Status: DISCONTINUED | OUTPATIENT
Start: 2025-01-26 | End: 2025-01-27

## 2025-01-26 RX ORDER — SENNOSIDES 8.6 MG
2 TABLET ORAL AT BEDTIME
Refills: 0 | Status: DISCONTINUED | OUTPATIENT
Start: 2025-01-26 | End: 2025-01-27

## 2025-01-26 RX ORDER — POLYETHYLENE GLYCOL 3350 17 G/17G
17 POWDER, FOR SOLUTION ORAL ONCE
Refills: 0 | Status: COMPLETED | OUTPATIENT
Start: 2025-01-26 | End: 2025-01-26

## 2025-01-26 RX ADMIN — LEVOTHYROXINE SODIUM 125 MICROGRAM(S): 25 TABLET ORAL at 05:45

## 2025-01-26 RX ADMIN — APIXABAN 5 MILLIGRAM(S): 5 TABLET, FILM COATED ORAL at 05:46

## 2025-01-26 RX ADMIN — ACETAMINOPHEN 650 MILLIGRAM(S): 160 SUSPENSION ORAL at 12:13

## 2025-01-26 RX ADMIN — ACETAMINOPHEN 650 MILLIGRAM(S): 160 SUSPENSION ORAL at 13:56

## 2025-01-26 RX ADMIN — POLYETHYLENE GLYCOL 3350 17 GRAM(S): 17 POWDER, FOR SOLUTION ORAL at 17:12

## 2025-01-26 RX ADMIN — Medication 0.5 MILLIGRAM(S): at 20:31

## 2025-01-26 RX ADMIN — Medication 0.5 MILLIGRAM(S): at 12:16

## 2025-01-26 RX ADMIN — ACETAMINOPHEN, DIPHENHYDRAMINE HCL, PHENYLEPHRINE HCL 3 MILLIGRAM(S): 325; 25; 5 TABLET ORAL at 20:31

## 2025-01-26 RX ADMIN — APIXABAN 5 MILLIGRAM(S): 5 TABLET, FILM COATED ORAL at 17:12

## 2025-01-26 RX ADMIN — ATORVASTATIN CALCIUM 20 MILLIGRAM(S): 80 TABLET, FILM COATED ORAL at 20:31

## 2025-01-26 NOTE — PROGRESS NOTE ADULT - ASSESSMENT
Ms Durbin is stable and very comfortable lying flat in bed.  Remains on oral anticoagulation with apixaban for history of recent DVT and pulmonary embolism.  States she is eating better although not clear as MS Durbin wants to please and knows this is beneficial.  No side effects from chemotherapy at this time.  No clinical evidence of congestive heart failure.  Abdomen seems to be less soft today.  No shortness of breath, chest discomfort or palpitations.  Cardiovascular status remains optimal.    Continue current treatment.  Out of bed only with assistance as Ms Durbin indicates she is at risk of falling.

## 2025-01-26 NOTE — PROGRESS NOTE ADULT - SUBJECTIVE AND OBJECTIVE BOX
Ms. Durbin seen n room [facility] for Dr. TYRONE Gross  Ms Durbin lying flat in bed.  States not much of an appetite although otherwise feels well.  States she is drinking a great deal of fluids.  Was out of bed yesterday most of the day into the bedside chair.  States she can not walk safely to the bathroom.  No chest discomfort or shortness of breath      Review Of Systems:  Constitutional: No Fever, Chills,  No Fatigue,   HEENT: No Blurred vision, No Headache   Respiratory: No Cough, No sputum production, No Wheezing, No Shortness of breath  Cardiovascular: No Chest Pain, No Palpitations, No Lightheadedness, No Falling, No Syncope, No PEÑA, No PND, No Orthopnea, No Peripehral Edema  Gastrointestinal: No Abdominal Pain, No Diarrhea, No Constipation, No Nausea, No Vomiting, Normal Appetite   Genitourinary: No Dysuria  Extremities: No Swelling, No Claudication,   Neurologic:  No Focal deficit, No Weakness, No Dysphagia, No Paresthesias, No Syncope  Skin: No Rash,  No Ecchymoses, No Wounds, No Tenderness, No Drainage     Medications:  acetaminophen     Tablet .. 650 milliGRAM(s) Oral every 6 hours PRN  ALPRAZolam 0.5 milliGRAM(s) Oral two times a day PRN  apixaban 5 milliGRAM(s) Oral every 12 hours  atorvastatin 20 milliGRAM(s) Oral at bedtime  diphenhydrAMINE Injectable 25 milliGRAM(s) IV Push once PRN  famotidine Injectable 20 milliGRAM(s) IV Push once PRN  levothyroxine 125 MICROGram(s) Oral daily  melatonin 3 milliGRAM(s) Oral at bedtime PRN  ondansetron Injectable 8 milliGRAM(s) IV Push every 8 hours    PMH/PSH/FH/SH: Unchanged  Vitals:  T(C): 36.5 (01-26-25 @ 04:10), Max: 36.7 (01-25-25 @ 20:42)  HR: 76 (01-26-25 @ 04:10) (76 - 84)  BP: 125/69 (01-26-25 @ 04:10) (108/74 - 125/69)  BP(mean): --  RR: 18 (01-26-25 @ 04:10) (17 - 18)  SpO2: 95% (01-26-25 @ 04:10) (94% - 96%)  Wt(kg): --  Daily     Daily     Physical Exam:  Appearance:  Normal, NAD  Eyes: PERRL, EOMI  HENT:  Normal oral mucosa NC/AT  Neck: without hepatojugular reflux, carotid 2+ equal without bruits  No Thyromegaly  Cardiovascular: normal regular S1, physiologic split S2,  No m/r/g   Respiratory: Clear to percussion and auscultation bilaterally  Gastrointestinal: Soft, Non-tender, More distended, BS+, No masses, Probable fluid  Neurologic: Non-focal, No focal neurologic deficits  Skin: No rashes, No ecchymoses, No cyanosis, 1+ bilateral ankle edema  Pulses-No palpable pulse in either lower extremity    01-25    137  |  104  |  15  ----------------------------<  150[H]  4.2   |  23  |  0.77    Ca    8.2[L]      25 Jan 2025 07:13    TPro  6.5  /  Alb  2.0[L]  /  TBili  0.4  /  DBili  x   /  AST  24  /  ALT  17  /  AlkPhos  135[H]  01-24 01-24 @ 07:01 - 01-25 @ 07:00  --------------------------------------------------------  IN: 600 mL / OUT: 400 mL / NET: 200 mL    01-25 @ 07:01 - 01-26 @ 06:09  --------------------------------------------------------  IN: 250 mL / OUT: 800 mL / NET: -550 mL

## 2025-01-27 ENCOUNTER — TRANSCRIPTION ENCOUNTER (OUTPATIENT)
Age: 89
End: 2025-01-27

## 2025-01-27 VITALS
OXYGEN SATURATION: 92 % | HEART RATE: 101 BPM | RESPIRATION RATE: 18 BRPM | SYSTOLIC BLOOD PRESSURE: 125 MMHG | DIASTOLIC BLOOD PRESSURE: 74 MMHG | TEMPERATURE: 98 F

## 2025-01-27 PROBLEM — I26.99 OTHER PULMONARY EMBOLISM WITHOUT ACUTE COR PULMONALE: Chronic | Status: ACTIVE | Noted: 2025-01-19

## 2025-01-27 PROCEDURE — 84443 ASSAY THYROID STIM HORMONE: CPT

## 2025-01-27 PROCEDURE — 80053 COMPREHEN METABOLIC PANEL: CPT

## 2025-01-27 PROCEDURE — 80061 LIPID PANEL: CPT

## 2025-01-27 PROCEDURE — 89051 BODY FLUID CELL COUNT: CPT

## 2025-01-27 PROCEDURE — 86900 BLOOD TYPING SEROLOGIC ABO: CPT

## 2025-01-27 PROCEDURE — 93970 EXTREMITY STUDY: CPT

## 2025-01-27 PROCEDURE — 99285 EMERGENCY DEPT VISIT HI MDM: CPT

## 2025-01-27 PROCEDURE — 49083 ABD PARACENTESIS W/IMAGING: CPT

## 2025-01-27 PROCEDURE — 87075 CULTR BACTERIA EXCEPT BLOOD: CPT

## 2025-01-27 PROCEDURE — 82042 OTHER SOURCE ALBUMIN QUAN EA: CPT

## 2025-01-27 PROCEDURE — 86850 RBC ANTIBODY SCREEN: CPT

## 2025-01-27 PROCEDURE — 85025 COMPLETE CBC W/AUTO DIFF WBC: CPT

## 2025-01-27 PROCEDURE — 85730 THROMBOPLASTIN TIME PARTIAL: CPT

## 2025-01-27 PROCEDURE — 87637 SARSCOV2&INF A&B&RSV AMP PRB: CPT

## 2025-01-27 PROCEDURE — 87070 CULTURE OTHR SPECIMN AEROBIC: CPT

## 2025-01-27 PROCEDURE — 87102 FUNGUS ISOLATION CULTURE: CPT

## 2025-01-27 PROCEDURE — 84439 ASSAY OF FREE THYROXINE: CPT

## 2025-01-27 PROCEDURE — 87205 SMEAR GRAM STAIN: CPT

## 2025-01-27 PROCEDURE — 71045 X-RAY EXAM CHEST 1 VIEW: CPT

## 2025-01-27 PROCEDURE — 86901 BLOOD TYPING SEROLOGIC RH(D): CPT

## 2025-01-27 PROCEDURE — 70450 CT HEAD/BRAIN W/O DYE: CPT | Mod: MC

## 2025-01-27 PROCEDURE — 84100 ASSAY OF PHOSPHORUS: CPT

## 2025-01-27 PROCEDURE — 72170 X-RAY EXAM OF PELVIS: CPT

## 2025-01-27 PROCEDURE — C1729: CPT

## 2025-01-27 PROCEDURE — 85610 PROTHROMBIN TIME: CPT

## 2025-01-27 PROCEDURE — 80048 BASIC METABOLIC PNL TOTAL CA: CPT

## 2025-01-27 PROCEDURE — 36415 COLL VENOUS BLD VENIPUNCTURE: CPT

## 2025-01-27 PROCEDURE — 97116 GAIT TRAINING THERAPY: CPT

## 2025-01-27 PROCEDURE — 82945 GLUCOSE OTHER FLUID: CPT

## 2025-01-27 PROCEDURE — 84157 ASSAY OF PROTEIN OTHER: CPT

## 2025-01-27 PROCEDURE — 97161 PT EVAL LOW COMPLEX 20 MIN: CPT

## 2025-01-27 PROCEDURE — 85027 COMPLETE CBC AUTOMATED: CPT

## 2025-01-27 PROCEDURE — 83735 ASSAY OF MAGNESIUM: CPT

## 2025-01-27 PROCEDURE — 83615 LACTATE (LD) (LDH) ENZYME: CPT

## 2025-01-27 PROCEDURE — 83036 HEMOGLOBIN GLYCOSYLATED A1C: CPT

## 2025-01-27 PROCEDURE — 97110 THERAPEUTIC EXERCISES: CPT

## 2025-01-27 PROCEDURE — 87015 SPECIMEN INFECT AGNT CONCNTJ: CPT

## 2025-01-27 RX ORDER — ACETAMINOPHEN, DIPHENHYDRAMINE HCL, PHENYLEPHRINE HCL 325; 25; 5 MG/1; MG/1; MG/1
1 TABLET ORAL
Qty: 0 | Refills: 0 | DISCHARGE
Start: 2025-01-27

## 2025-01-27 RX ORDER — ACETAMINOPHEN 160 MG/5ML
2 SUSPENSION ORAL
Qty: 0 | Refills: 0 | DISCHARGE
Start: 2025-01-27

## 2025-01-27 RX ORDER — LEVOTHYROXINE SODIUM 25 UG/1
1 TABLET ORAL
Qty: 0 | Refills: 0 | DISCHARGE
Start: 2025-01-27

## 2025-01-27 RX ORDER — APIXABAN 5 MG/1
1 TABLET, FILM COATED ORAL
Qty: 0 | Refills: 0 | DISCHARGE
Start: 2025-01-27

## 2025-01-27 RX ORDER — SENNOSIDES 8.6 MG
2 TABLET ORAL
Qty: 0 | Refills: 0 | DISCHARGE
Start: 2025-01-27

## 2025-01-27 RX ORDER — ATORVASTATIN CALCIUM 80 MG/1
1 TABLET, FILM COATED ORAL
Qty: 0 | Refills: 0 | DISCHARGE
Start: 2025-01-27

## 2025-01-27 RX ORDER — ALPRAZOLAM 2 MG
1 TABLET ORAL
Qty: 0 | Refills: 0 | DISCHARGE
Start: 2025-01-27

## 2025-01-27 RX ORDER — ALPRAZOLAM 0.25 MG/1
1 TABLET ORAL
Refills: 0 | DISCHARGE

## 2025-01-27 RX ORDER — PRAVASTATIN SODIUM 20 MG/1
1 TABLET ORAL
Refills: 0 | DISCHARGE

## 2025-01-27 RX ADMIN — APIXABAN 5 MILLIGRAM(S): 5 TABLET, FILM COATED ORAL at 05:32

## 2025-01-27 RX ADMIN — Medication 0.5 MILLIGRAM(S): at 11:41

## 2025-01-27 RX ADMIN — LEVOTHYROXINE SODIUM 125 MICROGRAM(S): 25 TABLET ORAL at 05:32

## 2025-01-27 NOTE — DISCHARGE NOTE PROVIDER - NSDCCPCAREPLAN_GEN_ALL_CORE_FT
PRINCIPAL DISCHARGE DIAGNOSIS  Diagnosis: Weakness  Assessment and Plan of Treatment: f/u in [facility]      SECONDARY DISCHARGE DIAGNOSES  Diagnosis: Physical deconditioning  Assessment and Plan of Treatment: improving    Diagnosis: Ascites  Assessment and Plan of Treatment: s/p paracentesis    Diagnosis: Carcinomatosis  Assessment and Plan of Treatment: Follow up with Dr Meredith for outpatient chemotherapy    Diagnosis: Hypothyroidism  Assessment and Plan of Treatment: continue home meds    Diagnosis: HLD (hyperlipidemia)  Assessment and Plan of Treatment:     Diagnosis: Anxiety  Assessment and Plan of Treatment: continue home meds    Diagnosis: Fall  Assessment and Plan of Treatment:

## 2025-01-27 NOTE — PROGRESS NOTE ADULT - ASSESSMENT
Impression: Stable since 1 dose carboplatin last week.    Plan:  Awaits insurance approval for transfer to rehab.

## 2025-01-27 NOTE — DISCHARGE NOTE PROVIDER - HOSPITAL COURSE
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 She believes she is deconditioned from her prolonged hospital stay recently where she was diagnosed with malignancy, PE, and had paracentesis for ascites. She denies any major source of pain, and also denies headstrike or LOC. She has not yet followed up outpatient because she was too weak to go to her appointment with gyn/onc. Reports abdominal and LE swelling have worsened since discharge, but are not as bad as previous hospitalization. Patient denies fever, chills, chest pain, SOB, headache, dizziness, abd pain, nausea, vomiting. (18 Jan 2025 23:44)    Hospital Course:  Fall.   ·  Plan: - CT Head: Negative for acute bleed  - Fall risk  - PT  -Tylenol PRN pain.    > Physical deconditioning.   ·  Plan: Patient with long, recent hospital stay and expressing weakness since  - Patient expressing desire for rehab and PT  - PT.    >  Ascites.   ·  Plan: - Consult IR to assess for possible paracentesis in setting of ascites 2/2 to malignancy  - Can give lasix pending evaluation by IR.    > Carcinomatosis.   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    > History of pulmonary embolism.   ·  Plan: - Continue eliquis.    > Hypothyroidism.   ·  Plan: - Continue levothyroxine.    >  HLD (hyperlipidemia).   ·  Plan: - Continue aspirin, statin.    > Anxiety.   ·  Plan: - Continue alprazolam (iStop confirmed).        Important Medication Changes and Reason:    Active or Pending Issues Requiring Follow-up:    Advanced Directives:   [ ] Full code  [ ] DNR  [ ] Hospice    Discharge Diagnoses:    Fall  Physical deconditioning  Ascites     HPI:  89F w/ Hx of Recently diagnosed Mullerian carcinoma (not yet on chemo) and pulmonary embolism/R femoral DVT (on Eliquis), ascites s/p paracentesis, osteoporosis, hypothyroidism, HLD, presents s/p 3 falls in the last week, 2 of which she needed help getting back up from. She believes she is deconditioned from her prolonged hospital stay recently where she was diagnosed with malignancy, PE, and had paracentesis for ascites. She denies any major source of pain, and also denies headstrike or LOC. She has not yet followed up outpatient because she was too weak to go to her appointment with gyn/onc. Reports abdominal and LE swelling have worsened since discharge, but are not as bad as previous hospitalization. Patient denies fever, chills, chest pain, SOB, headache, dizziness, abd pain, nausea, vomiting. (18 Jan 2025 23:44)    Hospital Course:  Fall.   ·  Plan: - CT Head: Negative for acute bleed  - Fall risk  - PT  -Tylenol PRN pain.    > Physical deconditioning.   ·  Plan: Patient with long, recent hospital stay and expressing weakness since  - Patient expressing desire for rehab and PT  - PT.    >  Ascites.   ·  Plan: - Consult IR to assess for possible paracentesis in setting of ascites 2/2 to malignancy  - s/p paracentesis- 4600ml removed    > Carcinomatosis.   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 The immunostains results support the diagnosis of carcinoma of GYN (MULLERIAN) origin, favor of adnexal/peritonea  - Evaluated by GYN oncology last admission. Given extent of disease, patient started neoadjuvant chemotherapy (1/23/25 with Carboplatin) and not candidate for upfront debulking   - She has a good performance status and is a candidate for neoadjuvant chemotherapy. Dr. Cole started single agent chemotherapy if patient is amenable and start doublet (given ar ZCC) if she tolerates.   - Started C1D1 single agent carboplatin AUC 4 1/23/25.     > History of pulmonary embolism.   ·  Plan: - Continue eliquis.    > Hypothyroidism.   ·  Plan: - Continue levothyroxine.    >  HLD (hyperlipidemia).   ·  Plan: - Continue aspirin, statin.    > Anxiety.   ·  Plan: - Continue alprazolam (iStop confirmed).        Important Medication Changes and Reason:    Active or Pending Issues Requiring Follow-up:    Advanced Directives:   [x ] Full code  [ ] DNR  [ ] Hospice    Discharge Diagnoses:    Fall  Physical deconditioning  Ascites  Carcinoma of muellerian

## 2025-01-27 NOTE — DISCHARGE NOTE NURSING/CASE MANAGEMENT/SOCIAL WORK - FINANCIAL ASSISTANCE
[facility] provides services at a reduced cost to those who are determined to be eligible through [facility]’s financial assistance program. Information regarding [facility]’s financial assistance program can be found by going to [email or web address] or by calling [phone].

## 2025-01-27 NOTE — DISCHARGE NOTE PROVIDER - NSDCDCMDCOMP_GEN_ALL_CORE
Pancho April, they are needing a note stating patient benefits from his CPAP machine and is needing new replacement. I do not see a current office visit for patient. Will need to schedule an appointment.      My chart message sent This document is complete and the patient is ready for discharge.

## 2025-01-27 NOTE — DISCHARGE NOTE PROVIDER - CARE PROVIDER_API CALL
Tim Gross  Cardiovascular Disease  [facility], [address] [address]  Phone: [phone]  Fax: [phone]  Follow Up Time: 1 week

## 2025-01-27 NOTE — DISCHARGE NOTE NURSING/CASE MANAGEMENT/SOCIAL WORK - NSDCFUADDAPPT_GEN_ALL_CORE_FT
- follow up appointment with Dr. Cole at hospital discharge. Patient will need a rehabilitation facility that can bring her back and forth to [facility] as she will need chemotherapy every 3 weeks.

## 2025-01-27 NOTE — PROGRESS NOTE ADULT - SUBJECTIVE AND OBJECTIVE BOX
C/O fatigue.  Denies dyspnea or pain.      REVIEW OF SYSTEMS:  CARDIOVASCULAR: No chest pain, dyspnea or palpitations  All other review of systems is negative unless indicated above    Medications:  acetaminophen     Tablet .. 650 milliGRAM(s) Oral every 6 hours PRN  ALPRAZolam 0.5 milliGRAM(s) Oral two times a day PRN  apixaban 5 milliGRAM(s) Oral every 12 hours  atorvastatin 20 milliGRAM(s) Oral at bedtime  diphenhydrAMINE Injectable 25 milliGRAM(s) IV Push once PRN  famotidine Injectable 20 milliGRAM(s) IV Push once PRN  levothyroxine 125 MICROGram(s) Oral daily  melatonin 3 milliGRAM(s) Oral at bedtime PRN  senna 2 Tablet(s) Oral at bedtime      Physical Exam:  Vitals:  T(C): 36.3 (01-27-25 @ 04:27), Max: 37.4 (01-26-25 @ 21:04)  HR: 99 (01-27-25 @ 04:27) (91 - 99)  BP: 118/69 (01-27-25 @ 04:27) (118/69 - 122/69)  BP(mean): --  RR: 18 (01-27-25 @ 04:27) (18 - 18)  SpO2: 94% (01-27-25 @ 04:27) (94% - 96%)  Wt(kg): --  Daily     Daily   I&O's Summary    26 Jan 2025 07:01  -  27 Jan 2025 07:00  --------------------------------------------------------  IN: 490 mL / OUT: 400 mL / NET: 90 mL        Appearance:  Normal, NAD  Eyes:  EOMI  HEENT: Normal oral [place], NC/AT  Neck:  No JVD  Respiratory: Clear to auscultation bilaterally  Cardiovascular: Normal S1 and S2 without murmurs, rubs or gallops  Abdomen:   Soft,  Non-tender without organomegally or masses  Extremities: Trace BL leg edema

## 2025-01-27 NOTE — PROGRESS NOTE ADULT - NUTRITIONAL ASSESSMENT
This patient has been assessed with a concern for Malnutrition and has been determined to have a diagnosis/diagnoses of Severe protein-calorie malnutrition.    This patient is being managed with:   Diet Regular-  Norris(7 Gm Arginine/7 Gm Glut/1.2 Gm HMB     Qty per Day:  2  Entered: Jan 20 2025 12:40PM  

## 2025-01-27 NOTE — PROGRESS NOTE ADULT - PROVIDER SPECIALTY LIST ADULT
Cardiology
Heme/Onc
Cardiology
Cardiology
Heme/Onc
Cardiology

## 2025-01-27 NOTE — DISCHARGE NOTE PROVIDER - NSDCFUADDAPPT_GEN_ALL_CORE_FT
- follow up appointment with Dr. Cole at hospital discharge. Patient will need a rehabilitation facility that can bring her back and forth to [facility] as she will need chemotherapy every 3 weeks.

## 2025-01-27 NOTE — DISCHARGE NOTE PROVIDER - DETAILS OF MALNUTRITION DIAGNOSIS/DIAGNOSES
This patient has been assessed with a concern for Malnutrition and was treated during this hospitalization for the following Nutrition diagnosis/diagnoses:     -  01/20/2025: Severe protein-calorie malnutrition   none

## 2025-01-27 NOTE — PROGRESS NOTE ADULT - REASON FOR ADMISSION
Chemotherapy for GYN malignancy, physical deconditioning, ascites
Fall, physical deconditioning, ascites

## 2025-01-27 NOTE — DISCHARGE NOTE NURSING/CASE MANAGEMENT/SOCIAL WORK - PATIENT PORTAL LINK FT
You can access the FollowMyHealth Patient Portal offered by [facility] by registering at the following website: [email or web address]. By joining [facility]’s FollowMyHealth portal, you will also be able to view your health information using other applications (apps) compatible with our system.

## 2025-01-27 NOTE — DISCHARGE NOTE PROVIDER - NSDCMRMEDTOKEN_GEN_ALL_CORE_FT
ALPRAZolam 0.5 mg oral tablet: 1 tab(s) orally 2 times a day as needed for  anxiety  Eliquis 5 mg oral tablet: 1 tab(s) orally every 12 hours  levothyroxine 100 mcg (0.1 mg) oral tablet: 1 tab(s) orally once a day  pravastatin 80 mg oral tablet: 1 tab(s) orally once a day   acetaminophen 325 mg oral tablet: 2 tab(s) orally every 6 hours As needed Temp greater or equal to 38C (100.4F), Mild Pain (1 - 3)  ALPRAZolam 0.5 mg oral tablet: 1 tab(s) orally 2 times a day As needed for anxiety  apixaban 5 mg oral tablet: 1 tab(s) orally every 12 hours  atorvastatin 20 mg oral tablet: 1 tab(s) orally once a day (at bedtime)  levothyroxine 125 mcg (0.125 mg) oral tablet: 1 tab(s) orally once a day  melatonin 3 mg oral tablet: 1 tab(s) orally once a day (at bedtime) As needed Insomnia  senna leaf extract oral tablet: 2 tab(s) orally once a day (at bedtime)

## 2025-01-29 ENCOUNTER — NON-APPOINTMENT (OUTPATIENT)
Age: 89
End: 2025-01-29

## 2025-02-01 LAB
CULTURE RESULTS: SIGNIFICANT CHANGE UP
SPECIMEN SOURCE: SIGNIFICANT CHANGE UP

## 2025-02-06 ENCOUNTER — NON-APPOINTMENT (OUTPATIENT)
Age: 89
End: 2025-02-06

## 2025-02-11 ENCOUNTER — RESULT REVIEW (OUTPATIENT)
Age: 89
End: 2025-02-11

## 2025-02-11 ENCOUNTER — APPOINTMENT (OUTPATIENT)
Dept: INFUSION THERAPY | Facility: HOSPITAL | Age: 89
End: 2025-02-11

## 2025-02-11 ENCOUNTER — NON-APPOINTMENT (OUTPATIENT)
Age: 89
End: 2025-02-11

## 2025-02-11 ENCOUNTER — APPOINTMENT (OUTPATIENT)
Dept: HEMATOLOGY ONCOLOGY | Facility: CLINIC | Age: 89
End: 2025-02-11
Payer: MEDICARE

## 2025-02-11 VITALS
TEMPERATURE: 97 F | RESPIRATION RATE: 16 BRPM | BODY MASS INDEX: 25.51 KG/M2 | SYSTOLIC BLOOD PRESSURE: 102 MMHG | HEART RATE: 101 BPM | WEIGHT: 134.99 LBS | OXYGEN SATURATION: 97 % | DIASTOLIC BLOOD PRESSURE: 69 MMHG

## 2025-02-11 LAB
APTT BLD: 32.5 SEC — SIGNIFICANT CHANGE UP (ref 24.5–35.6)
BASOPHILS # BLD AUTO: 0.03 K/UL — SIGNIFICANT CHANGE UP (ref 0–0.2)
BASOPHILS NFR BLD AUTO: 0.4 % — SIGNIFICANT CHANGE UP (ref 0–2)
CANCER AG125 SERPL-ACNC: 670 U/ML — HIGH
EOSINOPHIL # BLD AUTO: 0.01 K/UL — SIGNIFICANT CHANGE UP (ref 0–0.5)
EOSINOPHIL NFR BLD AUTO: 0.1 % — SIGNIFICANT CHANGE UP (ref 0–6)
HCT VFR BLD CALC: 43.6 % — SIGNIFICANT CHANGE UP (ref 34.5–45)
HGB BLD-MCNC: 13.8 G/DL — SIGNIFICANT CHANGE UP (ref 11.5–15.5)
IMM GRANULOCYTES NFR BLD AUTO: 0.5 % — SIGNIFICANT CHANGE UP (ref 0–0.9)
INR BLD: 1.72 RATIO — HIGH (ref 0.85–1.16)
LYMPHOCYTES # BLD AUTO: 2.83 K/UL — SIGNIFICANT CHANGE UP (ref 1–3.3)
LYMPHOCYTES # BLD AUTO: 37.1 % — SIGNIFICANT CHANGE UP (ref 13–44)
MCHC RBC-ENTMCNC: 30.8 PG — SIGNIFICANT CHANGE UP (ref 27–34)
MCHC RBC-ENTMCNC: 31.7 G/DL — LOW (ref 32–36)
MCV RBC AUTO: 97.3 FL — SIGNIFICANT CHANGE UP (ref 80–100)
MONOCYTES # BLD AUTO: 0.58 K/UL — SIGNIFICANT CHANGE UP (ref 0–0.9)
MONOCYTES NFR BLD AUTO: 7.6 % — SIGNIFICANT CHANGE UP (ref 2–14)
NEUTROPHILS # BLD AUTO: 4.14 K/UL — SIGNIFICANT CHANGE UP (ref 1.8–7.4)
NEUTROPHILS NFR BLD AUTO: 54.3 % — SIGNIFICANT CHANGE UP (ref 43–77)
NRBC BLD AUTO-RTO: 0 /100 WBCS — SIGNIFICANT CHANGE UP (ref 0–0)
PLATELET # BLD AUTO: 364 K/UL — SIGNIFICANT CHANGE UP (ref 150–400)
PROTHROM AB SERPL-ACNC: 20.2 SEC — HIGH (ref 9.9–13.4)
RBC # BLD: 4.48 M/UL — SIGNIFICANT CHANGE UP (ref 3.8–5.2)
RBC # FLD: 18 % — HIGH (ref 10.3–14.5)
WBC # BLD: 7.63 K/UL — SIGNIFICANT CHANGE UP (ref 3.8–10.5)
WBC # FLD AUTO: 7.63 K/UL — SIGNIFICANT CHANGE UP (ref 3.8–10.5)

## 2025-02-11 PROCEDURE — G2211 COMPLEX E/M VISIT ADD ON: CPT

## 2025-02-11 PROCEDURE — 99215 OFFICE O/P EST HI 40 MIN: CPT

## 2025-02-12 ENCOUNTER — NON-APPOINTMENT (OUTPATIENT)
Age: 89
End: 2025-02-12

## 2025-02-12 DIAGNOSIS — E86.0 DEHYDRATION: ICD-10-CM

## 2025-02-12 DIAGNOSIS — C78.6 SECONDARY MALIGNANT NEOPLASM OF RETROPERITONEUM AND PERITONEUM: ICD-10-CM

## 2025-02-18 ENCOUNTER — NON-APPOINTMENT (OUTPATIENT)
Age: 89
End: 2025-02-18

## 2025-02-18 ENCOUNTER — RESULT REVIEW (OUTPATIENT)
Age: 89
End: 2025-02-18

## 2025-02-18 ENCOUNTER — APPOINTMENT (OUTPATIENT)
Dept: INFUSION THERAPY | Facility: HOSPITAL | Age: 89
End: 2025-02-18

## 2025-02-18 ENCOUNTER — APPOINTMENT (OUTPATIENT)
Dept: HEMATOLOGY ONCOLOGY | Facility: CLINIC | Age: 89
End: 2025-02-18
Payer: MEDICARE

## 2025-02-18 ENCOUNTER — APPOINTMENT (OUTPATIENT)
Dept: ULTRASOUND IMAGING | Facility: IMAGING CENTER | Age: 89
End: 2025-02-18

## 2025-02-18 DIAGNOSIS — R18.0 MALIGNANT ASCITES: ICD-10-CM

## 2025-02-18 DIAGNOSIS — I26.99 OTHER PULMONARY EMBOLISM W/OUT ACUTE COR PULMONALE: ICD-10-CM

## 2025-02-18 DIAGNOSIS — C78.6 SECONDARY MALIGNANT NEOPLASM OF RETROPERITONEUM AND PERITONEUM: ICD-10-CM

## 2025-02-18 DIAGNOSIS — C56.1 MALIGNANT NEOPLASM OF RIGHT OVARY: ICD-10-CM

## 2025-02-18 LAB
ALBUMIN SERPL ELPH-MCNC: 2.8 G/DL — LOW (ref 3.3–5)
ALP SERPL-CCNC: 97 U/L — SIGNIFICANT CHANGE UP (ref 40–120)
ALT FLD-CCNC: 25 U/L — SIGNIFICANT CHANGE UP (ref 10–45)
ANION GAP SERPL CALC-SCNC: 14 MMOL/L — SIGNIFICANT CHANGE UP (ref 5–17)
AST SERPL-CCNC: 39 U/L — SIGNIFICANT CHANGE UP (ref 10–40)
BASOPHILS # BLD AUTO: 0.03 K/UL — SIGNIFICANT CHANGE UP (ref 0–0.2)
BASOPHILS NFR BLD AUTO: 0.5 % — SIGNIFICANT CHANGE UP (ref 0–2)
BILIRUB SERPL-MCNC: 0.4 MG/DL — SIGNIFICANT CHANGE UP (ref 0.2–1.2)
BUN SERPL-MCNC: 20 MG/DL — SIGNIFICANT CHANGE UP (ref 7–23)
CALCIUM SERPL-MCNC: 9.2 MG/DL — SIGNIFICANT CHANGE UP (ref 8.4–10.5)
CHLORIDE SERPL-SCNC: 102 MMOL/L — SIGNIFICANT CHANGE UP (ref 96–108)
CO2 SERPL-SCNC: 23 MMOL/L — SIGNIFICANT CHANGE UP (ref 22–31)
CREAT SERPL-MCNC: 0.64 MG/DL — SIGNIFICANT CHANGE UP (ref 0.5–1.3)
EGFR: 85 ML/MIN/1.73M2 — SIGNIFICANT CHANGE UP
EGFR: 85 ML/MIN/1.73M2 — SIGNIFICANT CHANGE UP
EOSINOPHIL # BLD AUTO: 0.02 K/UL — SIGNIFICANT CHANGE UP (ref 0–0.5)
EOSINOPHIL NFR BLD AUTO: 0.4 % — SIGNIFICANT CHANGE UP (ref 0–6)
GLUCOSE SERPL-MCNC: 114 MG/DL — HIGH (ref 70–99)
HCT VFR BLD CALC: 39.2 % — SIGNIFICANT CHANGE UP (ref 34.5–45)
HGB BLD-MCNC: 12.9 G/DL — SIGNIFICANT CHANGE UP (ref 11.5–15.5)
IMM GRANULOCYTES NFR BLD AUTO: 0.4 % — SIGNIFICANT CHANGE UP (ref 0–0.9)
LYMPHOCYTES # BLD AUTO: 1.65 K/UL — SIGNIFICANT CHANGE UP (ref 1–3.3)
LYMPHOCYTES # BLD AUTO: 29.5 % — SIGNIFICANT CHANGE UP (ref 13–44)
MAGNESIUM SERPL-MCNC: 2.2 MG/DL — SIGNIFICANT CHANGE UP (ref 1.6–2.6)
MCHC RBC-ENTMCNC: 31.7 PG — SIGNIFICANT CHANGE UP (ref 27–34)
MCHC RBC-ENTMCNC: 32.9 G/DL — SIGNIFICANT CHANGE UP (ref 32–36)
MCV RBC AUTO: 96.3 FL — SIGNIFICANT CHANGE UP (ref 80–100)
MONOCYTES # BLD AUTO: 0.52 K/UL — SIGNIFICANT CHANGE UP (ref 0–0.9)
MONOCYTES NFR BLD AUTO: 9.3 % — SIGNIFICANT CHANGE UP (ref 2–14)
NEUTROPHILS # BLD AUTO: 3.35 K/UL — SIGNIFICANT CHANGE UP (ref 1.8–7.4)
NEUTROPHILS NFR BLD AUTO: 59.9 % — SIGNIFICANT CHANGE UP (ref 43–77)
NRBC BLD AUTO-RTO: 0 /100 WBCS — SIGNIFICANT CHANGE UP (ref 0–0)
PLATELET # BLD AUTO: 262 K/UL — SIGNIFICANT CHANGE UP (ref 150–400)
POTASSIUM SERPL-MCNC: 5.1 MMOL/L — SIGNIFICANT CHANGE UP (ref 3.5–5.3)
POTASSIUM SERPL-SCNC: 5.1 MMOL/L — SIGNIFICANT CHANGE UP (ref 3.5–5.3)
PROT SERPL-MCNC: 8 G/DL — SIGNIFICANT CHANGE UP (ref 6–8.3)
RBC # BLD: 4.07 M/UL — SIGNIFICANT CHANGE UP (ref 3.8–5.2)
RBC # FLD: 18.3 % — HIGH (ref 10.3–14.5)
SODIUM SERPL-SCNC: 139 MMOL/L — SIGNIFICANT CHANGE UP (ref 135–145)
WBC # BLD: 5.59 K/UL — SIGNIFICANT CHANGE UP (ref 3.8–10.5)
WBC # FLD AUTO: 5.59 K/UL — SIGNIFICANT CHANGE UP (ref 3.8–10.5)

## 2025-02-18 PROCEDURE — 99215 OFFICE O/P EST HI 40 MIN: CPT

## 2025-02-18 PROCEDURE — G2211 COMPLEX E/M VISIT ADD ON: CPT

## 2025-02-19 DIAGNOSIS — R11.2 NAUSEA WITH VOMITING, UNSPECIFIED: ICD-10-CM

## 2025-02-19 DIAGNOSIS — Z51.11 ENCOUNTER FOR ANTINEOPLASTIC CHEMOTHERAPY: ICD-10-CM

## 2025-02-19 LAB
CULTURE RESULTS: SIGNIFICANT CHANGE UP
HBV CORE AB SER-ACNC: SIGNIFICANT CHANGE UP
HBV SURFACE AB SER-ACNC: ABNORMAL
HBV SURFACE AG SER-ACNC: SIGNIFICANT CHANGE UP
HCV AB S/CO SERPL IA: 0.1 S/CO — SIGNIFICANT CHANGE UP (ref 0–0.99)
HCV AB SERPL-IMP: SIGNIFICANT CHANGE UP
SPECIMEN SOURCE: SIGNIFICANT CHANGE UP

## 2025-02-21 ENCOUNTER — NON-APPOINTMENT (OUTPATIENT)
Age: 89
End: 2025-02-21

## 2025-02-24 ENCOUNTER — NON-APPOINTMENT (OUTPATIENT)
Age: 89
End: 2025-02-24

## 2025-02-27 ENCOUNTER — RESULT REVIEW (OUTPATIENT)
Age: 89
End: 2025-02-27

## 2025-03-01 ENCOUNTER — OUTPATIENT (OUTPATIENT)
Dept: OUTPATIENT SERVICES | Facility: HOSPITAL | Age: 89
LOS: 1 days | End: 2025-03-01
Payer: MEDICARE

## 2025-03-01 ENCOUNTER — APPOINTMENT (OUTPATIENT)
Dept: ULTRASOUND IMAGING | Facility: IMAGING CENTER | Age: 89
End: 2025-03-01
Payer: MEDICARE

## 2025-03-01 DIAGNOSIS — Z00.8 ENCOUNTER FOR OTHER GENERAL EXAMINATION: ICD-10-CM

## 2025-03-01 DIAGNOSIS — Z96.652 PRESENCE OF LEFT ARTIFICIAL KNEE JOINT: Chronic | ICD-10-CM

## 2025-03-01 DIAGNOSIS — C78.6 SECONDARY MALIGNANT NEOPLASM OF RETROPERITONEUM AND PERITONEUM: ICD-10-CM

## 2025-03-01 DIAGNOSIS — Z90.49 ACQUIRED ABSENCE OF OTHER SPECIFIED PARTS OF DIGESTIVE TRACT: Chronic | ICD-10-CM

## 2025-03-01 PROCEDURE — 76705 ECHO EXAM OF ABDOMEN: CPT

## 2025-03-01 PROCEDURE — 76705 ECHO EXAM OF ABDOMEN: CPT | Mod: 26

## 2025-03-05 ENCOUNTER — LABORATORY RESULT (OUTPATIENT)
Age: 89
End: 2025-03-05

## 2025-03-05 ENCOUNTER — RESULT REVIEW (OUTPATIENT)
Age: 89
End: 2025-03-05

## 2025-03-05 ENCOUNTER — APPOINTMENT (OUTPATIENT)
Dept: HEMATOLOGY ONCOLOGY | Facility: CLINIC | Age: 89
End: 2025-03-05
Payer: MEDICARE

## 2025-03-05 VITALS
OXYGEN SATURATION: 95 % | HEART RATE: 79 BPM | DIASTOLIC BLOOD PRESSURE: 66 MMHG | TEMPERATURE: 97.1 F | RESPIRATION RATE: 16 BRPM | SYSTOLIC BLOOD PRESSURE: 108 MMHG

## 2025-03-05 DIAGNOSIS — C56.1 MALIGNANT NEOPLASM OF RIGHT OVARY: ICD-10-CM

## 2025-03-05 DIAGNOSIS — C78.6 SECONDARY MALIGNANT NEOPLASM OF RETROPERITONEUM AND PERITONEUM: ICD-10-CM

## 2025-03-05 DIAGNOSIS — N39.0 URINARY TRACT INFECTION, SITE NOT SPECIFIED: ICD-10-CM

## 2025-03-05 DIAGNOSIS — K59.00 CONSTIPATION, UNSPECIFIED: ICD-10-CM

## 2025-03-05 LAB
BASOPHILS # BLD AUTO: 0.01 K/UL — SIGNIFICANT CHANGE UP (ref 0–0.2)
BASOPHILS NFR BLD AUTO: 0.2 % — SIGNIFICANT CHANGE UP (ref 0–2)
EOSINOPHIL # BLD AUTO: 0.03 K/UL — SIGNIFICANT CHANGE UP (ref 0–0.5)
EOSINOPHIL NFR BLD AUTO: 0.7 % — SIGNIFICANT CHANGE UP (ref 0–6)
HCT VFR BLD CALC: 36.6 % — SIGNIFICANT CHANGE UP (ref 34.5–45)
HGB BLD-MCNC: 11.8 G/DL — SIGNIFICANT CHANGE UP (ref 11.5–15.5)
IMM GRANULOCYTES NFR BLD AUTO: 0.2 % — SIGNIFICANT CHANGE UP (ref 0–0.9)
LYMPHOCYTES # BLD AUTO: 2.5 K/UL — SIGNIFICANT CHANGE UP (ref 1–3.3)
LYMPHOCYTES # BLD AUTO: 58 % — HIGH (ref 13–44)
MCHC RBC-ENTMCNC: 32.2 G/DL — SIGNIFICANT CHANGE UP (ref 32–36)
MCHC RBC-ENTMCNC: 32.2 PG — SIGNIFICANT CHANGE UP (ref 27–34)
MCV RBC AUTO: 100 FL — SIGNIFICANT CHANGE UP (ref 80–100)
MONOCYTES # BLD AUTO: 0.61 K/UL — SIGNIFICANT CHANGE UP (ref 0–0.9)
MONOCYTES NFR BLD AUTO: 14.2 % — HIGH (ref 2–14)
NEUTROPHILS # BLD AUTO: 1.15 K/UL — LOW (ref 1.8–7.4)
NEUTROPHILS NFR BLD AUTO: 26.7 % — LOW (ref 43–77)
NRBC BLD AUTO-RTO: 0 /100 WBCS — SIGNIFICANT CHANGE UP (ref 0–0)
PLATELET # BLD AUTO: 156 K/UL — SIGNIFICANT CHANGE UP (ref 150–400)
RBC # BLD: 3.66 M/UL — LOW (ref 3.8–5.2)
RBC # FLD: 18.3 % — HIGH (ref 10.3–14.5)
WBC # BLD: 4.31 K/UL — SIGNIFICANT CHANGE UP (ref 3.8–10.5)
WBC # FLD AUTO: 4.31 K/UL — SIGNIFICANT CHANGE UP (ref 3.8–10.5)

## 2025-03-05 PROCEDURE — 99214 OFFICE O/P EST MOD 30 MIN: CPT

## 2025-03-06 LAB
ALBUMIN SERPL ELPH-MCNC: 3.4 G/DL
ALP BLD-CCNC: 79 U/L
ALT SERPL-CCNC: 20 U/L
ANION GAP SERPL CALC-SCNC: 11 MMOL/L
APPEARANCE: ABNORMAL
AST SERPL-CCNC: 23 U/L
BILIRUB SERPL-MCNC: 0.5 MG/DL
BILIRUBIN URINE: ABNORMAL
BLOOD URINE: ABNORMAL
BUN SERPL-MCNC: 25 MG/DL
CALCIUM SERPL-MCNC: 9.5 MG/DL
CHLORIDE SERPL-SCNC: 106 MMOL/L
CO2 SERPL-SCNC: 22 MMOL/L
COLOR: NORMAL
CREAT SERPL-MCNC: 0.86 MG/DL
EGFRCR SERPLBLD CKD-EPI 2021: 65 ML/MIN/1.73M2
GLUCOSE QUALITATIVE U: NEGATIVE MG/DL
GLUCOSE SERPL-MCNC: 102 MG/DL
KETONES URINE: ABNORMAL MG/DL
LEUKOCYTE ESTERASE URINE: ABNORMAL
MAGNESIUM SERPL-MCNC: 2.1 MG/DL
NITRITE URINE: NEGATIVE
PH URINE: 5.5
POTASSIUM SERPL-SCNC: 5.2 MMOL/L
PROT SERPL-MCNC: 8 G/DL
PROTEIN URINE: 300 MG/DL
SODIUM SERPL-SCNC: 140 MMOL/L
SPECIFIC GRAVITY URINE: >1.03
UROBILINOGEN URINE: 1 MG/DL

## 2025-03-08 LAB — BACTERIA UR CULT: ABNORMAL

## 2025-03-10 DIAGNOSIS — R19.7 DIARRHEA, UNSPECIFIED: ICD-10-CM

## 2025-03-10 RX ORDER — LOPERAMIDE HYDROCHLORIDE 2 MG/1
2 TABLET ORAL
Qty: 30 | Refills: 1 | Status: ACTIVE | COMMUNITY
Start: 2025-03-10 | End: 1900-01-01

## 2025-03-10 RX ORDER — NITROFURANTOIN (MONOHYDRATE/MACROCRYSTALS) 25; 75 MG/1; MG/1
100 CAPSULE ORAL
Qty: 14 | Refills: 0 | Status: DISCONTINUED | COMMUNITY
Start: 2025-03-05 | End: 2025-03-10

## 2025-03-10 RX ORDER — CEFPODOXIME PROXETIL 100 MG/1
100 TABLET, FILM COATED ORAL
Qty: 14 | Refills: 0 | Status: ACTIVE | COMMUNITY
Start: 2025-03-10 | End: 1900-01-01

## 2025-03-12 ENCOUNTER — RESULT REVIEW (OUTPATIENT)
Age: 89
End: 2025-03-12

## 2025-03-12 ENCOUNTER — APPOINTMENT (OUTPATIENT)
Dept: HEMATOLOGY ONCOLOGY | Facility: CLINIC | Age: 89
End: 2025-03-12

## 2025-03-12 ENCOUNTER — APPOINTMENT (OUTPATIENT)
Dept: INFUSION THERAPY | Facility: HOSPITAL | Age: 89
End: 2025-03-12

## 2025-03-12 LAB
ALBUMIN SERPL ELPH-MCNC: 3.3 G/DL — SIGNIFICANT CHANGE UP (ref 3.3–5)
ALP SERPL-CCNC: 56 U/L — SIGNIFICANT CHANGE UP (ref 40–120)
ALT FLD-CCNC: 19 U/L — SIGNIFICANT CHANGE UP (ref 10–45)
ANION GAP SERPL CALC-SCNC: 8 MMOL/L — SIGNIFICANT CHANGE UP (ref 5–17)
AST SERPL-CCNC: 31 U/L — SIGNIFICANT CHANGE UP (ref 10–40)
BASOPHILS # BLD AUTO: 0.03 K/UL — SIGNIFICANT CHANGE UP (ref 0–0.2)
BASOPHILS NFR BLD AUTO: 0.6 % — SIGNIFICANT CHANGE UP (ref 0–2)
BILIRUB SERPL-MCNC: 0.4 MG/DL — SIGNIFICANT CHANGE UP (ref 0.2–1.2)
BUN SERPL-MCNC: 23 MG/DL — SIGNIFICANT CHANGE UP (ref 7–23)
CALCIUM SERPL-MCNC: 9.3 MG/DL — SIGNIFICANT CHANGE UP (ref 8.4–10.5)
CHLORIDE SERPL-SCNC: 105 MMOL/L — SIGNIFICANT CHANGE UP (ref 96–108)
CO2 SERPL-SCNC: 25 MMOL/L — SIGNIFICANT CHANGE UP (ref 22–31)
CREAT SERPL-MCNC: 0.75 MG/DL — SIGNIFICANT CHANGE UP (ref 0.5–1.3)
EGFR: 77 ML/MIN/1.73M2 — SIGNIFICANT CHANGE UP
EGFR: 77 ML/MIN/1.73M2 — SIGNIFICANT CHANGE UP
EOSINOPHIL # BLD AUTO: 0.08 K/UL — SIGNIFICANT CHANGE UP (ref 0–0.5)
EOSINOPHIL NFR BLD AUTO: 1.6 % — SIGNIFICANT CHANGE UP (ref 0–6)
GLUCOSE SERPL-MCNC: 149 MG/DL — HIGH (ref 70–99)
HCT VFR BLD CALC: 32.2 % — LOW (ref 34.5–45)
HGB BLD-MCNC: 10.7 G/DL — LOW (ref 11.5–15.5)
IMM GRANULOCYTES NFR BLD AUTO: 0.2 % — SIGNIFICANT CHANGE UP (ref 0–0.9)
LYMPHOCYTES # BLD AUTO: 1.82 K/UL — SIGNIFICANT CHANGE UP (ref 1–3.3)
LYMPHOCYTES # BLD AUTO: 36.8 % — SIGNIFICANT CHANGE UP (ref 13–44)
MAGNESIUM SERPL-MCNC: 1.9 MG/DL — SIGNIFICANT CHANGE UP (ref 1.6–2.6)
MCHC RBC-ENTMCNC: 32.7 PG — SIGNIFICANT CHANGE UP (ref 27–34)
MCHC RBC-ENTMCNC: 33.2 G/DL — SIGNIFICANT CHANGE UP (ref 32–36)
MCV RBC AUTO: 98.5 FL — SIGNIFICANT CHANGE UP (ref 80–100)
MONOCYTES # BLD AUTO: 0.47 K/UL — SIGNIFICANT CHANGE UP (ref 0–0.9)
MONOCYTES NFR BLD AUTO: 9.5 % — SIGNIFICANT CHANGE UP (ref 2–14)
NEUTROPHILS # BLD AUTO: 2.54 K/UL — SIGNIFICANT CHANGE UP (ref 1.8–7.4)
NEUTROPHILS NFR BLD AUTO: 51.3 % — SIGNIFICANT CHANGE UP (ref 43–77)
NRBC BLD AUTO-RTO: 0 /100 WBCS — SIGNIFICANT CHANGE UP (ref 0–0)
PLATELET # BLD AUTO: 195 K/UL — SIGNIFICANT CHANGE UP (ref 150–400)
POTASSIUM SERPL-MCNC: 4.1 MMOL/L — SIGNIFICANT CHANGE UP (ref 3.5–5.3)
POTASSIUM SERPL-SCNC: 4.1 MMOL/L — SIGNIFICANT CHANGE UP (ref 3.5–5.3)
PROT SERPL-MCNC: 8 G/DL — SIGNIFICANT CHANGE UP (ref 6–8.3)
RBC # BLD: 3.27 M/UL — LOW (ref 3.8–5.2)
RBC # FLD: 19 % — HIGH (ref 10.3–14.5)
SODIUM SERPL-SCNC: 137 MMOL/L — SIGNIFICANT CHANGE UP (ref 135–145)
WBC # BLD: 4.95 K/UL — SIGNIFICANT CHANGE UP (ref 3.8–10.5)
WBC # FLD AUTO: 4.95 K/UL — SIGNIFICANT CHANGE UP (ref 3.8–10.5)

## 2025-03-12 RX ORDER — PHENAZOPYRIDINE 200 MG/1
200 TABLET, FILM COATED ORAL 3 TIMES DAILY
Qty: 6 | Refills: 0 | Status: ACTIVE | COMMUNITY
Start: 2025-03-12 | End: 1900-01-01

## 2025-03-25 ENCOUNTER — OUTPATIENT (OUTPATIENT)
Dept: OUTPATIENT SERVICES | Facility: HOSPITAL | Age: 89
LOS: 1 days | Discharge: HOME | End: 2025-03-25

## 2025-03-25 DIAGNOSIS — Z96.652 PRESENCE OF LEFT ARTIFICIAL KNEE JOINT: Chronic | ICD-10-CM

## 2025-03-25 DIAGNOSIS — Z90.49 ACQUIRED ABSENCE OF OTHER SPECIFIED PARTS OF DIGESTIVE TRACT: Chronic | ICD-10-CM

## 2025-03-25 DIAGNOSIS — C56.9 MALIGNANT NEOPLASM OF UNSPECIFIED OVARY: ICD-10-CM

## 2025-03-26 ENCOUNTER — APPOINTMENT (OUTPATIENT)
Dept: HEMATOLOGY ONCOLOGY | Facility: CLINIC | Age: 89
End: 2025-03-26
Payer: MEDICARE

## 2025-03-26 VITALS
WEIGHT: 134 LBS | BODY MASS INDEX: 25.32 KG/M2 | DIASTOLIC BLOOD PRESSURE: 72 MMHG | RESPIRATION RATE: 16 BRPM | TEMPERATURE: 97 F | OXYGEN SATURATION: 97 % | HEART RATE: 87 BPM | SYSTOLIC BLOOD PRESSURE: 121 MMHG

## 2025-03-26 DIAGNOSIS — Z13.71 ENCOUNTER FOR NONPROCREATIVE SCREENING FOR GENETIC DISEASE CARRIER STATUS: ICD-10-CM

## 2025-03-26 DIAGNOSIS — C56.1 MALIGNANT NEOPLASM OF RIGHT OVARY: ICD-10-CM

## 2025-03-26 DIAGNOSIS — I26.99 OTHER PULMONARY EMBOLISM W/OUT ACUTE COR PULMONALE: ICD-10-CM

## 2025-03-26 DIAGNOSIS — C78.6 SECONDARY MALIGNANT NEOPLASM OF RETROPERITONEUM AND PERITONEUM: ICD-10-CM

## 2025-03-26 DIAGNOSIS — K13.79 OTHER LESIONS OF ORAL MUCOSA: ICD-10-CM

## 2025-03-26 PROCEDURE — 99215 OFFICE O/P EST HI 40 MIN: CPT

## 2025-03-26 PROCEDURE — G2211 COMPLEX E/M VISIT ADD ON: CPT

## 2025-03-26 RX ORDER — LIDOCAINE HYDROCHLORIDE 20 MG/ML
2 SOLUTION ORAL; TOPICAL
Qty: 200 | Refills: 1 | Status: ACTIVE | COMMUNITY
Start: 2025-03-26 | End: 1900-01-01

## 2025-03-30 PROBLEM — Z13.71 BRCA NEGATIVE: Status: ACTIVE | Noted: 2025-03-30

## 2025-04-02 ENCOUNTER — RESULT REVIEW (OUTPATIENT)
Age: 89
End: 2025-04-02

## 2025-04-02 ENCOUNTER — APPOINTMENT (OUTPATIENT)
Dept: HEMATOLOGY ONCOLOGY | Facility: CLINIC | Age: 89
End: 2025-04-02

## 2025-04-02 ENCOUNTER — APPOINTMENT (OUTPATIENT)
Dept: INFUSION THERAPY | Facility: HOSPITAL | Age: 89
End: 2025-04-02

## 2025-04-02 ENCOUNTER — NON-APPOINTMENT (OUTPATIENT)
Age: 89
End: 2025-04-02

## 2025-04-02 LAB
ALBUMIN SERPL ELPH-MCNC: 3.7 G/DL — SIGNIFICANT CHANGE UP (ref 3.3–5)
ALP SERPL-CCNC: 54 U/L — SIGNIFICANT CHANGE UP (ref 40–120)
ALT FLD-CCNC: 17 U/L — SIGNIFICANT CHANGE UP (ref 10–45)
ANION GAP SERPL CALC-SCNC: 9 MMOL/L — SIGNIFICANT CHANGE UP (ref 5–17)
AST SERPL-CCNC: 24 U/L — SIGNIFICANT CHANGE UP (ref 10–40)
BASOPHILS # BLD AUTO: 0.03 K/UL — SIGNIFICANT CHANGE UP (ref 0–0.2)
BASOPHILS NFR BLD AUTO: 0.6 % — SIGNIFICANT CHANGE UP (ref 0–2)
BILIRUB SERPL-MCNC: 0.6 MG/DL — SIGNIFICANT CHANGE UP (ref 0.2–1.2)
BUN SERPL-MCNC: 25 MG/DL — HIGH (ref 7–23)
CALCIUM SERPL-MCNC: 9.8 MG/DL — SIGNIFICANT CHANGE UP (ref 8.4–10.5)
CANCER AG125 SERPL-ACNC: 422 U/ML — HIGH
CHLORIDE SERPL-SCNC: 103 MMOL/L — SIGNIFICANT CHANGE UP (ref 96–108)
CO2 SERPL-SCNC: 26 MMOL/L — SIGNIFICANT CHANGE UP (ref 22–31)
CREAT SERPL-MCNC: 0.75 MG/DL — SIGNIFICANT CHANGE UP (ref 0.5–1.3)
EGFR: 77 ML/MIN/1.73M2 — SIGNIFICANT CHANGE UP
EGFR: 77 ML/MIN/1.73M2 — SIGNIFICANT CHANGE UP
EOSINOPHIL # BLD AUTO: 0.06 K/UL — SIGNIFICANT CHANGE UP (ref 0–0.5)
EOSINOPHIL NFR BLD AUTO: 1.2 % — SIGNIFICANT CHANGE UP (ref 0–6)
GLUCOSE SERPL-MCNC: 108 MG/DL — HIGH (ref 70–99)
HCT VFR BLD CALC: 30.5 % — LOW (ref 34.5–45)
HGB BLD-MCNC: 10.5 G/DL — LOW (ref 11.5–15.5)
IMM GRANULOCYTES NFR BLD AUTO: 0.4 % — SIGNIFICANT CHANGE UP (ref 0–0.9)
LYMPHOCYTES # BLD AUTO: 1.6 K/UL — SIGNIFICANT CHANGE UP (ref 1–3.3)
LYMPHOCYTES # BLD AUTO: 31.5 % — SIGNIFICANT CHANGE UP (ref 13–44)
MAGNESIUM SERPL-MCNC: 2 MG/DL — SIGNIFICANT CHANGE UP (ref 1.6–2.6)
MCHC RBC-ENTMCNC: 34.3 PG — HIGH (ref 27–34)
MCHC RBC-ENTMCNC: 34.4 G/DL — SIGNIFICANT CHANGE UP (ref 32–36)
MCV RBC AUTO: 99.7 FL — SIGNIFICANT CHANGE UP (ref 80–100)
MONOCYTES # BLD AUTO: 0.61 K/UL — SIGNIFICANT CHANGE UP (ref 0–0.9)
MONOCYTES NFR BLD AUTO: 12 % — SIGNIFICANT CHANGE UP (ref 2–14)
NEUTROPHILS # BLD AUTO: 2.76 K/UL — SIGNIFICANT CHANGE UP (ref 1.8–7.4)
NEUTROPHILS NFR BLD AUTO: 54.3 % — SIGNIFICANT CHANGE UP (ref 43–77)
NRBC BLD AUTO-RTO: 0 /100 WBCS — SIGNIFICANT CHANGE UP (ref 0–0)
PLATELET # BLD AUTO: 95 K/UL — LOW (ref 150–400)
POTASSIUM SERPL-MCNC: 4.8 MMOL/L — SIGNIFICANT CHANGE UP (ref 3.5–5.3)
POTASSIUM SERPL-SCNC: 4.8 MMOL/L — SIGNIFICANT CHANGE UP (ref 3.5–5.3)
PROT SERPL-MCNC: 8.4 G/DL — HIGH (ref 6–8.3)
RBC # BLD: 3.06 M/UL — LOW (ref 3.8–5.2)
RBC # FLD: 18.4 % — HIGH (ref 10.3–14.5)
SODIUM SERPL-SCNC: 138 MMOL/L — SIGNIFICANT CHANGE UP (ref 135–145)
WBC # BLD: 5.08 K/UL — SIGNIFICANT CHANGE UP (ref 3.8–10.5)
WBC # FLD AUTO: 5.08 K/UL — SIGNIFICANT CHANGE UP (ref 3.8–10.5)

## 2025-04-03 DIAGNOSIS — R11.2 NAUSEA WITH VOMITING, UNSPECIFIED: ICD-10-CM

## 2025-04-03 DIAGNOSIS — C78.6 SECONDARY MALIGNANT NEOPLASM OF RETROPERITONEUM AND PERITONEUM: ICD-10-CM

## 2025-04-03 DIAGNOSIS — Z51.11 ENCOUNTER FOR ANTINEOPLASTIC CHEMOTHERAPY: ICD-10-CM

## 2025-04-16 ENCOUNTER — APPOINTMENT (OUTPATIENT)
Dept: HEMATOLOGY ONCOLOGY | Facility: CLINIC | Age: 89
End: 2025-04-16

## 2025-04-18 ENCOUNTER — APPOINTMENT (OUTPATIENT)
Dept: NUCLEAR MEDICINE | Facility: IMAGING CENTER | Age: 89
End: 2025-04-18

## 2025-04-18 ENCOUNTER — OUTPATIENT (OUTPATIENT)
Dept: OUTPATIENT SERVICES | Facility: HOSPITAL | Age: 89
LOS: 1 days | End: 2025-04-18
Payer: MEDICARE

## 2025-04-18 DIAGNOSIS — C78.6 SECONDARY MALIGNANT NEOPLASM OF RETROPERITONEUM AND PERITONEUM: ICD-10-CM

## 2025-04-18 DIAGNOSIS — Z96.652 PRESENCE OF LEFT ARTIFICIAL KNEE JOINT: Chronic | ICD-10-CM

## 2025-04-18 DIAGNOSIS — Z90.49 ACQUIRED ABSENCE OF OTHER SPECIFIED PARTS OF DIGESTIVE TRACT: Chronic | ICD-10-CM

## 2025-04-18 DIAGNOSIS — C56.1 MALIGNANT NEOPLASM OF RIGHT OVARY: ICD-10-CM

## 2025-04-18 PROCEDURE — A9552: CPT

## 2025-04-18 PROCEDURE — 78815 PET IMAGE W/CT SKULL-THIGH: CPT | Mod: 26,PS

## 2025-04-18 PROCEDURE — 78815 PET IMAGE W/CT SKULL-THIGH: CPT

## 2025-04-23 ENCOUNTER — RESULT REVIEW (OUTPATIENT)
Age: 89
End: 2025-04-23

## 2025-04-23 ENCOUNTER — APPOINTMENT (OUTPATIENT)
Dept: INFUSION THERAPY | Facility: HOSPITAL | Age: 89
End: 2025-04-23

## 2025-04-23 ENCOUNTER — NON-APPOINTMENT (OUTPATIENT)
Age: 89
End: 2025-04-23

## 2025-04-23 ENCOUNTER — APPOINTMENT (OUTPATIENT)
Dept: HEMATOLOGY ONCOLOGY | Facility: CLINIC | Age: 89
End: 2025-04-23
Payer: MEDICARE

## 2025-04-23 ENCOUNTER — APPOINTMENT (OUTPATIENT)
Dept: HEMATOLOGY ONCOLOGY | Facility: CLINIC | Age: 89
End: 2025-04-23

## 2025-04-23 VITALS
WEIGHT: 126.76 LBS | HEIGHT: 61.02 IN | RESPIRATION RATE: 14 BRPM | SYSTOLIC BLOOD PRESSURE: 110 MMHG | OXYGEN SATURATION: 95 % | HEART RATE: 52 BPM | BODY MASS INDEX: 23.93 KG/M2 | TEMPERATURE: 97.4 F | DIASTOLIC BLOOD PRESSURE: 63 MMHG

## 2025-04-23 DIAGNOSIS — C56.1 MALIGNANT NEOPLASM OF RIGHT OVARY: ICD-10-CM

## 2025-04-23 DIAGNOSIS — I26.99 OTHER PULMONARY EMBOLISM W/OUT ACUTE COR PULMONALE: ICD-10-CM

## 2025-04-23 DIAGNOSIS — C78.6 SECONDARY MALIGNANT NEOPLASM OF RETROPERITONEUM AND PERITONEUM: ICD-10-CM

## 2025-04-23 LAB
ALBUMIN SERPL ELPH-MCNC: 3.9 G/DL
ALP BLD-CCNC: 62 U/L
ALT SERPL-CCNC: 13 U/L
ANION GAP SERPL CALC-SCNC: 14 MMOL/L
AST SERPL-CCNC: 20 U/L
BASOPHILS # BLD AUTO: 0.02 K/UL — SIGNIFICANT CHANGE UP (ref 0–0.2)
BASOPHILS NFR BLD AUTO: 0.4 % — SIGNIFICANT CHANGE UP (ref 0–2)
BILIRUB SERPL-MCNC: 0.5 MG/DL
BUN SERPL-MCNC: 25 MG/DL
CALCIUM SERPL-MCNC: 10 MG/DL
CANCER AG125 SERPL-ACNC: 484 U/ML
CHLORIDE SERPL-SCNC: 100 MMOL/L
CO2 SERPL-SCNC: 22 MMOL/L
CREAT SERPL-MCNC: 0.76 MG/DL
EGFRCR SERPLBLD CKD-EPI 2021: 75 ML/MIN/1.73M2
EOSINOPHIL # BLD AUTO: 0.01 K/UL — SIGNIFICANT CHANGE UP (ref 0–0.5)
EOSINOPHIL NFR BLD AUTO: 0.2 % — SIGNIFICANT CHANGE UP (ref 0–6)
GLUCOSE SERPL-MCNC: 112 MG/DL
HCT VFR BLD CALC: 30.5 % — LOW (ref 34.5–45)
HGB BLD-MCNC: 10.1 G/DL — LOW (ref 11.5–15.5)
IMM GRANULOCYTES NFR BLD AUTO: 0.2 % — SIGNIFICANT CHANGE UP (ref 0–0.9)
LYMPHOCYTES # BLD AUTO: 2.25 K/UL — SIGNIFICANT CHANGE UP (ref 1–3.3)
LYMPHOCYTES # BLD AUTO: 41.6 % — SIGNIFICANT CHANGE UP (ref 13–44)
MCHC RBC-ENTMCNC: 33.1 G/DL — SIGNIFICANT CHANGE UP (ref 32–36)
MCHC RBC-ENTMCNC: 35.9 PG — HIGH (ref 27–34)
MCV RBC AUTO: 107.5 FL — HIGH (ref 80–100)
MONOCYTES # BLD AUTO: 0.52 K/UL — SIGNIFICANT CHANGE UP (ref 0–0.9)
MONOCYTES NFR BLD AUTO: 9.6 % — SIGNIFICANT CHANGE UP (ref 2–14)
NEUTROPHILS # BLD AUTO: 2.6 K/UL — SIGNIFICANT CHANGE UP (ref 1.8–7.4)
NEUTROPHILS NFR BLD AUTO: 48 % — SIGNIFICANT CHANGE UP (ref 43–77)
NRBC BLD AUTO-RTO: 0 /100 WBCS — SIGNIFICANT CHANGE UP (ref 0–0)
PLATELET # BLD AUTO: 200 K/UL — SIGNIFICANT CHANGE UP (ref 150–400)
POTASSIUM SERPL-SCNC: 5.4 MMOL/L
PROT SERPL-MCNC: 8.9 G/DL
RBC # BLD: 2.81 M/UL — LOW (ref 3.8–5.2)
RBC # FLD: 19.4 % — HIGH (ref 10.3–14.5)
SODIUM SERPL-SCNC: 136 MMOL/L
WBC # BLD: 5.41 K/UL — SIGNIFICANT CHANGE UP (ref 3.8–10.5)
WBC # FLD AUTO: 5.41 K/UL — SIGNIFICANT CHANGE UP (ref 3.8–10.5)

## 2025-04-23 PROCEDURE — G2211 COMPLEX E/M VISIT ADD ON: CPT

## 2025-04-23 PROCEDURE — 99214 OFFICE O/P EST MOD 30 MIN: CPT

## 2025-04-26 ENCOUNTER — NON-APPOINTMENT (OUTPATIENT)
Age: 89
End: 2025-04-26

## 2025-05-09 ENCOUNTER — APPOINTMENT (OUTPATIENT)
Dept: GYNECOLOGIC ONCOLOGY | Facility: CLINIC | Age: 89
End: 2025-05-09
Payer: MEDICARE

## 2025-05-09 VITALS
HEIGHT: 61 IN | BODY MASS INDEX: 23.79 KG/M2 | WEIGHT: 126 LBS | DIASTOLIC BLOOD PRESSURE: 72 MMHG | HEART RATE: 96 BPM | SYSTOLIC BLOOD PRESSURE: 126 MMHG | OXYGEN SATURATION: 98 % | TEMPERATURE: 96.7 F

## 2025-05-09 DIAGNOSIS — C56.1 MALIGNANT NEOPLASM OF RIGHT OVARY: ICD-10-CM

## 2025-05-09 DIAGNOSIS — R97.1 ELEVATED CANCER ANTIGEN 125 [CA 125]: ICD-10-CM

## 2025-05-09 DIAGNOSIS — R59.1 GENERALIZED ENLARGED LYMPH NODES: ICD-10-CM

## 2025-05-09 PROCEDURE — 99215 OFFICE O/P EST HI 40 MIN: CPT

## 2025-05-09 PROCEDURE — 99459 PELVIC EXAMINATION: CPT

## 2025-05-09 RX ORDER — LEVOTHYROXINE SODIUM 137 UG/1
TABLET ORAL
Refills: 0 | Status: ACTIVE | COMMUNITY

## 2025-05-11 PROBLEM — R59.1 LYMPHADENOPATHY: Status: ACTIVE | Noted: 2025-05-09

## 2025-05-11 PROBLEM — R97.1 ELEVATED CA-125: Status: ACTIVE | Noted: 2025-05-11

## 2025-05-12 LAB — HPV HIGH+LOW RISK DNA PNL CVX: NOT DETECTED

## 2025-05-15 LAB — CYTOLOGY CVX/VAG DOC THIN PREP: ABNORMAL

## 2025-06-26 ENCOUNTER — APPOINTMENT (OUTPATIENT)
Dept: UROLOGY | Facility: CLINIC | Age: 89
End: 2025-06-26
Payer: MEDICARE

## 2025-06-26 PROCEDURE — G2211 COMPLEX E/M VISIT ADD ON: CPT

## 2025-06-26 PROCEDURE — 99214 OFFICE O/P EST MOD 30 MIN: CPT

## 2025-06-26 RX ORDER — VIBEGRON 75 MG/1
75 TABLET, FILM COATED ORAL
Qty: 30 | Refills: 5 | Status: ACTIVE | COMMUNITY
Start: 2025-06-26 | End: 1900-01-01

## 2025-07-25 ENCOUNTER — APPOINTMENT (OUTPATIENT)
Dept: NEPHROLOGY | Facility: CLINIC | Age: 89
End: 2025-07-25
Payer: MEDICARE

## 2025-07-25 ENCOUNTER — LABORATORY RESULT (OUTPATIENT)
Age: 89
End: 2025-07-25

## 2025-07-25 VITALS
SYSTOLIC BLOOD PRESSURE: 128 MMHG | HEIGHT: 61 IN | HEART RATE: 92 BPM | WEIGHT: 124 LBS | OXYGEN SATURATION: 97 % | BODY MASS INDEX: 23.41 KG/M2 | DIASTOLIC BLOOD PRESSURE: 70 MMHG | TEMPERATURE: 96.9 F

## 2025-07-25 DIAGNOSIS — N39.0 URINARY TRACT INFECTION, SITE NOT SPECIFIED: ICD-10-CM

## 2025-07-25 DIAGNOSIS — R80.9 PROTEINURIA, UNSPECIFIED: ICD-10-CM

## 2025-07-25 PROCEDURE — 99205 OFFICE O/P NEW HI 60 MIN: CPT | Mod: GC

## 2025-07-28 DIAGNOSIS — N39.0 URINARY TRACT INFECTION, SITE NOT SPECIFIED: ICD-10-CM

## 2025-07-28 RX ORDER — AMOXICILLIN AND CLAVULANATE POTASSIUM 875; 125 MG/1; MG/1
875-125 TABLET, COATED ORAL
Qty: 14 | Refills: 0 | Status: ACTIVE | COMMUNITY
Start: 2025-07-28 | End: 1900-01-01

## 2025-07-31 ENCOUNTER — NON-APPOINTMENT (OUTPATIENT)
Age: 89
End: 2025-07-31

## 2025-08-05 ENCOUNTER — NON-APPOINTMENT (OUTPATIENT)
Age: 89
End: 2025-08-05

## 2025-08-05 LAB
ALBUMIN MFR SERPL ELPH: 46.1 %
ALBUMIN SERPL-MCNC: 3.9 G/DL
ALBUMIN, RANDOM URINE: 105.1 MG/DL
ALBUMIN/GLOB SERPL: 0.9 RATIO
ALPHA1 GLOB MFR SERPL ELPH: 3.6 %
ALPHA1 GLOB SERPL ELPH-MCNC: 0.3 G/DL
ALPHA2 GLOB MFR SERPL ELPH: 10.8 %
ALPHA2 GLOB SERPL ELPH-MCNC: 0.9 G/DL
APPEARANCE: ABNORMAL
B-GLOBULIN MFR SERPL ELPH: 9.3 %
B-GLOBULIN SERPL ELPH-MCNC: 0.8 G/DL
BACTERIA UR CULT: ABNORMAL
BACTERIA: ABNORMAL /HPF
BILIRUBIN URINE: NEGATIVE
BLOOD URINE: ABNORMAL
C3 SERPL-MCNC: 128 MG/DL
C4 SERPL-MCNC: 25 MG/DL
CALCIUM OXALATE CRYSTALS: PRESENT
CAST: 2 /LPF
COLOR: YELLOW
CREAT SPEC-SCNC: 102 MG/DL
CREAT SPEC-SCNC: 102 MG/DL
CREAT/PROT UR: 3.1 RATIO
EPITHELIAL CELLS: 4 /HPF
GAMMA GLOB FLD ELPH-MCNC: 2.5 G/DL
GAMMA GLOB MFR SERPL ELPH: 30.2 %
GBM AB TITR SER IF: <0.2 AL
GLUCOSE QUALITATIVE U: NEGATIVE MG/DL
HAPTOGLOB SERPL-MCNC: 169 MG/DL
INTERPRETATION SERPL IEP-IMP: NORMAL
KETONES URINE: NEGATIVE MG/DL
LDH SERPL-CCNC: 142 U/L
LEUKOCYTE ESTERASE URINE: ABNORMAL
Lab: NEGATIVE
M PROTEIN MFR SERPL ELPH: NORMAL
M PROTEIN SPEC IFE-MCNC: NORMAL
MICROALBUMIN/CREAT 24H UR-RTO: 1035 MG/G
MICROSCOPIC-UA: NORMAL
MONOCLON BAND OBS SERPL: NORMAL
MYELOPEROXIDASE AB SER QL IA: <0.2 AL
MYELOPEROXIDASE CELLS FLD QL: NEGATIVE
NITRITE URINE: NEGATIVE
PH URINE: 6
PHOSPHOLIPASE A2 RECEPTOR ELISA: <1.8 RU/ML
PROT SERPL-MCNC: 8.4 G/DL
PROT SERPL-MCNC: 8.4 G/DL
PROT UR-MCNC: 310 MG/DL
PROTEIN URINE: 300 MG/DL
PROTEINASE3 AB SER IA-ACNC: <0.2 AL
PROTEINASE3 AB SER-ACNC: NEGATIVE
RED BLOOD CELLS URINE: 10 /HPF
REVIEW: NORMAL
SPECIFIC GRAVITY URINE: 1.02
UROBILINOGEN URINE: 1 MG/DL
WHITE BLOOD CELLS URINE: >998 /HPF

## 2025-08-07 ENCOUNTER — APPOINTMENT (OUTPATIENT)
Dept: UROLOGY | Facility: CLINIC | Age: 89
End: 2025-08-07

## 2025-09-18 ENCOUNTER — APPOINTMENT (OUTPATIENT)
Dept: UROLOGY | Facility: CLINIC | Age: 89
End: 2025-09-18
Payer: MEDICARE

## 2025-09-18 VITALS
WEIGHT: 124 LBS | HEART RATE: 100 BPM | DIASTOLIC BLOOD PRESSURE: 72 MMHG | BODY MASS INDEX: 23.41 KG/M2 | RESPIRATION RATE: 16 BRPM | TEMPERATURE: 96.7 F | SYSTOLIC BLOOD PRESSURE: 123 MMHG | OXYGEN SATURATION: 97 % | HEIGHT: 61 IN

## 2025-09-18 DIAGNOSIS — N39.0 URINARY TRACT INFECTION, SITE NOT SPECIFIED: ICD-10-CM

## 2025-09-18 DIAGNOSIS — N95.2 POSTMENOPAUSAL ATROPHIC VAGINITIS: ICD-10-CM

## 2025-09-18 DIAGNOSIS — R35.1 NOCTURIA: ICD-10-CM

## 2025-09-18 DIAGNOSIS — N39.41 URGE INCONTINENCE: ICD-10-CM

## 2025-09-18 PROCEDURE — 99214 OFFICE O/P EST MOD 30 MIN: CPT

## 2025-09-18 PROCEDURE — G2211 COMPLEX E/M VISIT ADD ON: CPT
